# Patient Record
Sex: FEMALE | Race: BLACK OR AFRICAN AMERICAN | Employment: UNEMPLOYED | URBAN - METROPOLITAN AREA
[De-identification: names, ages, dates, MRNs, and addresses within clinical notes are randomized per-mention and may not be internally consistent; named-entity substitution may affect disease eponyms.]

---

## 2020-06-13 ENCOUNTER — HOSPITAL ENCOUNTER (INPATIENT)
Age: 40
LOS: 3 days | Discharge: HOME HEALTH | DRG: 760 | End: 2020-06-16
Attending: EMERGENCY MEDICINE | Admitting: PSYCHIATRY & NEUROLOGY
Payer: MEDICAID

## 2020-06-13 PROBLEM — F29 PSYCHOSIS (HCC): Status: ACTIVE | Noted: 2020-06-13

## 2020-06-13 LAB
ABSOLUTE EOS #: 0 K/UL (ref 0–0.4)
ABSOLUTE IMMATURE GRANULOCYTE: ABNORMAL K/UL (ref 0–0.3)
ABSOLUTE LYMPH #: 1.5 K/UL (ref 1–4.8)
ABSOLUTE MONO #: 0.4 K/UL (ref 0.1–1.3)
ACETAMINOPHEN LEVEL: <5 UG/ML (ref 10–30)
ALBUMIN SERPL-MCNC: 4 G/DL (ref 3.5–5.2)
ALBUMIN/GLOBULIN RATIO: ABNORMAL (ref 1–2.5)
ALP BLD-CCNC: 74 U/L (ref 35–104)
ALT SERPL-CCNC: 15 U/L (ref 5–33)
ANION GAP SERPL CALCULATED.3IONS-SCNC: 13 MMOL/L (ref 9–17)
AST SERPL-CCNC: 18 U/L
BASOPHILS # BLD: 1 % (ref 0–2)
BASOPHILS ABSOLUTE: 0 K/UL (ref 0–0.2)
BILIRUB SERPL-MCNC: 0.49 MG/DL (ref 0.3–1.2)
BUN BLDV-MCNC: 7 MG/DL (ref 6–20)
BUN/CREAT BLD: ABNORMAL (ref 9–20)
CALCIUM SERPL-MCNC: 9 MG/DL (ref 8.6–10.4)
CHLORIDE BLD-SCNC: 101 MMOL/L (ref 98–107)
CO2: 23 MMOL/L (ref 20–31)
CREAT SERPL-MCNC: 0.56 MG/DL (ref 0.5–0.9)
DIFFERENTIAL TYPE: ABNORMAL
EOSINOPHILS RELATIVE PERCENT: 0 % (ref 0–4)
ETHANOL PERCENT: <0.01 %
ETHANOL: <10 MG/DL
GFR AFRICAN AMERICAN: >60 ML/MIN
GFR NON-AFRICAN AMERICAN: >60 ML/MIN
GFR SERPL CREATININE-BSD FRML MDRD: ABNORMAL ML/MIN/{1.73_M2}
GFR SERPL CREATININE-BSD FRML MDRD: ABNORMAL ML/MIN/{1.73_M2}
GLUCOSE BLD-MCNC: 122 MG/DL (ref 70–99)
HCG QUALITATIVE: NEGATIVE
HCT VFR BLD CALC: 35.2 % (ref 36–46)
HEMOGLOBIN: 11.4 G/DL (ref 12–16)
IMMATURE GRANULOCYTES: ABNORMAL %
LYMPHOCYTES # BLD: 22 % (ref 24–44)
MCH RBC QN AUTO: 28.3 PG (ref 26–34)
MCHC RBC AUTO-ENTMCNC: 32.5 G/DL (ref 31–37)
MCV RBC AUTO: 87.2 FL (ref 80–100)
MONOCYTES # BLD: 6 % (ref 1–7)
NRBC AUTOMATED: ABNORMAL PER 100 WBC
PDW BLD-RTO: 16.2 % (ref 11.5–14.9)
PLATELET # BLD: 187 K/UL (ref 150–450)
PLATELET ESTIMATE: ABNORMAL
PMV BLD AUTO: 9.1 FL (ref 6–12)
POTASSIUM SERPL-SCNC: 3 MMOL/L (ref 3.7–5.3)
RBC # BLD: 4.03 M/UL (ref 4–5.2)
RBC # BLD: ABNORMAL 10*6/UL
SALICYLATE LEVEL: <1 MG/DL (ref 3–10)
SARS-COV-2, PCR: NORMAL
SARS-COV-2, RAPID: NOT DETECTED
SARS-COV-2: NORMAL
SEG NEUTROPHILS: 71 % (ref 36–66)
SEGMENTED NEUTROPHILS ABSOLUTE COUNT: 4.9 K/UL (ref 1.3–9.1)
SODIUM BLD-SCNC: 137 MMOL/L (ref 135–144)
SOURCE: NORMAL
TOTAL PROTEIN: 7.6 G/DL (ref 6.4–8.3)
TSH SERPL DL<=0.05 MIU/L-ACNC: 1.15 MIU/L (ref 0.3–5)
WBC # BLD: 6.9 K/UL (ref 3.5–11)
WBC # BLD: ABNORMAL 10*3/UL

## 2020-06-13 PROCEDURE — 80053 COMPREHEN METABOLIC PANEL: CPT

## 2020-06-13 PROCEDURE — 84443 ASSAY THYROID STIM HORMONE: CPT

## 2020-06-13 PROCEDURE — U0002 COVID-19 LAB TEST NON-CDC: HCPCS

## 2020-06-13 PROCEDURE — G0480 DRUG TEST DEF 1-7 CLASSES: HCPCS

## 2020-06-13 PROCEDURE — 6370000000 HC RX 637 (ALT 250 FOR IP): Performed by: NURSE PRACTITIONER

## 2020-06-13 PROCEDURE — 36415 COLL VENOUS BLD VENIPUNCTURE: CPT

## 2020-06-13 PROCEDURE — 80307 DRUG TEST PRSMV CHEM ANLYZR: CPT

## 2020-06-13 PROCEDURE — 1240000000 HC EMOTIONAL WELLNESS R&B

## 2020-06-13 PROCEDURE — 6370000000 HC RX 637 (ALT 250 FOR IP): Performed by: EMERGENCY MEDICINE

## 2020-06-13 PROCEDURE — 93005 ELECTROCARDIOGRAM TRACING: CPT | Performed by: EMERGENCY MEDICINE

## 2020-06-13 PROCEDURE — 84703 CHORIONIC GONADOTROPIN ASSAY: CPT

## 2020-06-13 PROCEDURE — 85025 COMPLETE CBC W/AUTO DIFF WBC: CPT

## 2020-06-13 PROCEDURE — 99285 EMERGENCY DEPT VISIT HI MDM: CPT

## 2020-06-13 RX ORDER — MAGNESIUM HYDROXIDE/ALUMINUM HYDROXICE/SIMETHICONE 120; 1200; 1200 MG/30ML; MG/30ML; MG/30ML
30 SUSPENSION ORAL EVERY 6 HOURS PRN
Status: DISCONTINUED | OUTPATIENT
Start: 2020-06-13 | End: 2020-06-16 | Stop reason: HOSPADM

## 2020-06-13 RX ORDER — LORAZEPAM 1 MG/1
1 TABLET ORAL ONCE
Status: DISCONTINUED | OUTPATIENT
Start: 2020-06-13 | End: 2020-06-16 | Stop reason: HOSPADM

## 2020-06-13 RX ORDER — BENZTROPINE MESYLATE 1 MG/ML
2 INJECTION INTRAMUSCULAR; INTRAVENOUS 2 TIMES DAILY PRN
Status: DISCONTINUED | OUTPATIENT
Start: 2020-06-13 | End: 2020-06-16 | Stop reason: HOSPADM

## 2020-06-13 RX ORDER — NICOTINE 21 MG/24HR
1 PATCH, TRANSDERMAL 24 HOURS TRANSDERMAL DAILY
Status: DISCONTINUED | OUTPATIENT
Start: 2020-06-13 | End: 2020-06-16 | Stop reason: HOSPADM

## 2020-06-13 RX ORDER — POTASSIUM CHLORIDE 20 MEQ/1
40 TABLET, EXTENDED RELEASE ORAL ONCE
Status: COMPLETED | OUTPATIENT
Start: 2020-06-13 | End: 2020-06-13

## 2020-06-13 RX ORDER — ACETAMINOPHEN 325 MG/1
650 TABLET ORAL EVERY 4 HOURS PRN
Status: DISCONTINUED | OUTPATIENT
Start: 2020-06-13 | End: 2020-06-16 | Stop reason: HOSPADM

## 2020-06-13 RX ORDER — LORAZEPAM 1 MG/1
1 TABLET ORAL ONCE
Status: COMPLETED | OUTPATIENT
Start: 2020-06-13 | End: 2020-06-13

## 2020-06-13 RX ORDER — AMLODIPINE BESYLATE 5 MG/1
5 TABLET ORAL DAILY
Status: DISCONTINUED | OUTPATIENT
Start: 2020-06-13 | End: 2020-06-16 | Stop reason: HOSPADM

## 2020-06-13 RX ORDER — TRAZODONE HYDROCHLORIDE 50 MG/1
50 TABLET ORAL NIGHTLY PRN
Status: DISCONTINUED | OUTPATIENT
Start: 2020-06-13 | End: 2020-06-14

## 2020-06-13 RX ORDER — LISINOPRIL 5 MG/1
5 TABLET ORAL DAILY
Status: DISCONTINUED | OUTPATIENT
Start: 2020-06-13 | End: 2020-06-15

## 2020-06-13 RX ADMIN — POTASSIUM CHLORIDE 40 MEQ: 1500 TABLET, EXTENDED RELEASE ORAL at 18:09

## 2020-06-13 RX ADMIN — ACETAMINOPHEN 650 MG: 325 TABLET, FILM COATED ORAL at 20:23

## 2020-06-13 RX ADMIN — LORAZEPAM 1 MG: 1 TABLET ORAL at 13:27

## 2020-06-13 ASSESSMENT — PAIN SCALES - GENERAL
PAINLEVEL_OUTOF10: 10
PAINLEVEL_OUTOF10: 3
PAINLEVEL_OUTOF10: 0
PAINLEVEL_OUTOF10: 3

## 2020-06-13 ASSESSMENT — LIFESTYLE VARIABLES: HISTORY_ALCOHOL_USE: NO

## 2020-06-13 ASSESSMENT — ENCOUNTER SYMPTOMS
ABDOMINAL PAIN: 0
COUGH: 0

## 2020-06-13 ASSESSMENT — SLEEP AND FATIGUE QUESTIONNAIRES
DO YOU USE A SLEEP AID: NO
AVERAGE NUMBER OF SLEEP HOURS: 8
DO YOU HAVE DIFFICULTY SLEEPING: NO

## 2020-06-13 ASSESSMENT — PAIN DESCRIPTION - ORIENTATION: ORIENTATION: LEFT

## 2020-06-13 ASSESSMENT — PAIN DESCRIPTION - PAIN TYPE
TYPE: ACUTE PAIN
TYPE: CHRONIC PAIN;ACUTE PAIN

## 2020-06-13 ASSESSMENT — PAIN DESCRIPTION - LOCATION: LOCATION: HEAD

## 2020-06-13 ASSESSMENT — PATIENT HEALTH QUESTIONNAIRE - PHQ9: SUM OF ALL RESPONSES TO PHQ QUESTIONS 1-9: 7

## 2020-06-13 ASSESSMENT — PAIN - FUNCTIONAL ASSESSMENT: PAIN_FUNCTIONAL_ASSESSMENT: 0-10

## 2020-06-13 NOTE — ED NOTES
Dr Rubina Short and Christi Gonsales RN notified of pt's vital signs and headache.  Per  and [facility] pt is okay to stay in the [facility], RN  06/13/20 [id number]

## 2020-06-13 NOTE — ED NOTES
Report given to KRISTA Jensen from [address], [address] [place] [address]. Report method in person   The following was reviewed with receiving RN:   Current vital signs:  /83   Pulse 105   Temp 99.2 °F (37.3 °C) (Oral)   Resp 16   Ht 5' 4\" (1.626 m)   Wt 160 lb (72.6 kg)   LMP 06/13/2020   SpO2 100%   BMI 27.46 kg/m²                MEWS Score: 2     Any medication or safety alerts were reviewed. Any pending diagnostics and notifications were also reviewed, as well as any safety concerns or issues, abnormal labs, abnormal imaging, and abnormal assessment findings. Questions were answered.             Willis Hercules RN  06/13/20 2841

## 2020-06-13 NOTE — ED NOTES
Pt refusing EKG at this time. Became distressed upon staff approach as evidence by gesturing and requesting staff back away from her.  Pt pacing around the LIZZ     Alberto Taylor RN  06/13/20 3053

## 2020-06-13 NOTE — ED NOTES
Pt calm and cooperative with staff. Pleasant upon approach.  Sitting in chair watching TV     Armando Villalpando RN  06/13/20 [id number]
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
## 2020-06-14 PROBLEM — D50.8 IRON DEFICIENCY ANEMIA SECONDARY TO INADEQUATE DIETARY IRON INTAKE: Status: ACTIVE | Noted: 2020-06-14

## 2020-06-14 PROBLEM — E87.6 HYPOKALEMIA: Status: ACTIVE | Noted: 2020-06-14

## 2020-06-14 LAB
ABSOLUTE EOS #: 0.1 K/UL (ref 0–0.4)
ABSOLUTE IMMATURE GRANULOCYTE: ABNORMAL K/UL (ref 0–0.3)
ABSOLUTE LYMPH #: 1.1 K/UL (ref 1–4.8)
ABSOLUTE MONO #: 0.5 K/UL (ref 0.1–1.3)
ALBUMIN SERPL-MCNC: 3.8 G/DL (ref 3.5–5.2)
ALBUMIN/GLOBULIN RATIO: ABNORMAL (ref 1–2.5)
ALP BLD-CCNC: 71 U/L (ref 35–104)
ALT SERPL-CCNC: 15 U/L (ref 5–33)
ANION GAP SERPL CALCULATED.3IONS-SCNC: 12 MMOL/L (ref 9–17)
AST SERPL-CCNC: 19 U/L
BASOPHILS # BLD: 0 % (ref 0–2)
BASOPHILS ABSOLUTE: 0 K/UL (ref 0–0.2)
BILIRUB SERPL-MCNC: 0.4 MG/DL (ref 0.3–1.2)
BUN BLDV-MCNC: 7 MG/DL (ref 6–20)
BUN/CREAT BLD: ABNORMAL (ref 9–20)
CALCIUM SERPL-MCNC: 9.1 MG/DL (ref 8.6–10.4)
CHLORIDE BLD-SCNC: 102 MMOL/L (ref 98–107)
CHOLESTEROL/HDL RATIO: 3.7
CHOLESTEROL: 150 MG/DL
CO2: 25 MMOL/L (ref 20–31)
CREAT SERPL-MCNC: 0.42 MG/DL (ref 0.5–0.9)
DIFFERENTIAL TYPE: ABNORMAL
EOSINOPHILS RELATIVE PERCENT: 2 % (ref 0–4)
ESTIMATED AVERAGE GLUCOSE: 114 MG/DL
GFR AFRICAN AMERICAN: >60 ML/MIN
GFR NON-AFRICAN AMERICAN: >60 ML/MIN
GFR SERPL CREATININE-BSD FRML MDRD: ABNORMAL ML/MIN/{1.73_M2}
GFR SERPL CREATININE-BSD FRML MDRD: ABNORMAL ML/MIN/{1.73_M2}
GLUCOSE BLD-MCNC: 100 MG/DL (ref 70–99)
HBA1C MFR BLD: 5.6 % (ref 4–6)
HCT VFR BLD CALC: 34.6 % (ref 36–46)
HDLC SERPL-MCNC: 41 MG/DL
HEMOGLOBIN: 11.4 G/DL (ref 12–16)
IMMATURE GRANULOCYTES: ABNORMAL %
LDL CHOLESTEROL: 103 MG/DL (ref 0–130)
LYMPHOCYTES # BLD: 24 % (ref 24–44)
MCH RBC QN AUTO: 28.8 PG (ref 26–34)
MCHC RBC AUTO-ENTMCNC: 32.8 G/DL (ref 31–37)
MCV RBC AUTO: 87.8 FL (ref 80–100)
MONOCYTES # BLD: 10 % (ref 1–7)
NRBC AUTOMATED: ABNORMAL PER 100 WBC
PDW BLD-RTO: 16 % (ref 11.5–14.9)
PLATELET # BLD: 178 K/UL (ref 150–450)
PLATELET ESTIMATE: ABNORMAL
PMV BLD AUTO: 9.1 FL (ref 6–12)
POTASSIUM SERPL-SCNC: 3.6 MMOL/L (ref 3.7–5.3)
RBC # BLD: 3.94 M/UL (ref 4–5.2)
RBC # BLD: ABNORMAL 10*6/UL
SEG NEUTROPHILS: 64 % (ref 36–66)
SEGMENTED NEUTROPHILS ABSOLUTE COUNT: 2.9 K/UL (ref 1.3–9.1)
SODIUM BLD-SCNC: 139 MMOL/L (ref 135–144)
THYROXINE, FREE: 1.21 NG/DL (ref 0.93–1.7)
TOTAL PROTEIN: 7.3 G/DL (ref 6.4–8.3)
TRIGL SERPL-MCNC: 30 MG/DL
TSH SERPL DL<=0.05 MIU/L-ACNC: 1.3 MIU/L (ref 0.3–5)
VLDLC SERPL CALC-MCNC: NORMAL MG/DL (ref 1–30)
WBC # BLD: 4.5 K/UL (ref 3.5–11)
WBC # BLD: ABNORMAL 10*3/UL

## 2020-06-14 PROCEDURE — 80061 LIPID PANEL: CPT

## 2020-06-14 PROCEDURE — 6370000000 HC RX 637 (ALT 250 FOR IP): Performed by: PSYCHIATRY & NEUROLOGY

## 2020-06-14 PROCEDURE — 90792 PSYCH DIAG EVAL W/MED SRVCS: CPT | Performed by: PSYCHIATRY & NEUROLOGY

## 2020-06-14 PROCEDURE — 84443 ASSAY THYROID STIM HORMONE: CPT

## 2020-06-14 PROCEDURE — 6370000000 HC RX 637 (ALT 250 FOR IP): Performed by: NURSE PRACTITIONER

## 2020-06-14 PROCEDURE — 84439 ASSAY OF FREE THYROXINE: CPT

## 2020-06-14 PROCEDURE — 85025 COMPLETE CBC W/AUTO DIFF WBC: CPT

## 2020-06-14 PROCEDURE — 6370000000 HC RX 637 (ALT 250 FOR IP): Performed by: INTERNAL MEDICINE

## 2020-06-14 PROCEDURE — 99253 IP/OBS CNSLTJ NEW/EST LOW 45: CPT | Performed by: INTERNAL MEDICINE

## 2020-06-14 PROCEDURE — 80053 COMPREHEN METABOLIC PANEL: CPT

## 2020-06-14 PROCEDURE — 36415 COLL VENOUS BLD VENIPUNCTURE: CPT

## 2020-06-14 PROCEDURE — 1240000000 HC EMOTIONAL WELLNESS R&B

## 2020-06-14 PROCEDURE — 83036 HEMOGLOBIN GLYCOSYLATED A1C: CPT

## 2020-06-14 RX ORDER — DIPHENHYDRAMINE HCL 25 MG
25 TABLET ORAL NIGHTLY PRN
Status: DISCONTINUED | OUTPATIENT
Start: 2020-06-14 | End: 2020-06-16 | Stop reason: HOSPADM

## 2020-06-14 RX ADMIN — DIPHENHYDRAMINE HCL 25 MG: 25 TABLET ORAL at 22:22

## 2020-06-14 RX ADMIN — ACETAMINOPHEN 650 MG: 325 TABLET, FILM COATED ORAL at 12:32

## 2020-06-14 RX ADMIN — AMLODIPINE BESYLATE 5 MG: 5 TABLET ORAL at 14:31

## 2020-06-14 RX ADMIN — ACETAMINOPHEN 650 MG: 325 TABLET, FILM COATED ORAL at 20:19

## 2020-06-14 RX ADMIN — LISINOPRIL 5 MG: 5 TABLET ORAL at 08:23

## 2020-06-14 ASSESSMENT — PAIN SCALES - GENERAL
PAINLEVEL_OUTOF10: 1
PAINLEVEL_OUTOF10: 3
PAINLEVEL_OUTOF10: 0
PAINLEVEL_OUTOF10: 3

## 2020-06-14 ASSESSMENT — PAIN DESCRIPTION - DESCRIPTORS: DESCRIPTORS: DISCOMFORT

## 2020-06-14 ASSESSMENT — LIFESTYLE VARIABLES: HISTORY_ALCOHOL_USE: NO

## 2020-06-14 NOTE — CONSULTS
Result Value Ref Range    hCG Qual NEGATIVE NEGATIVE   Ethanol    Collection Time: 06/13/20  3:30 PM   Result Value Ref Range    Ethanol <10 <10 mg/dL    Ethanol percent <0.010 %   Acetaminophen level    Collection Time: 06/13/20  3:30 PM   Result Value Ref Range    Acetaminophen Level <5 (L) 10 - 30 ug/mL   Salicylate    Collection Time: 06/13/20  3:30 PM   Result Value Ref Range    Salicylate Lvl <1 (L) 3 - 10 mg/dL   COVID-19    Collection Time: 06/13/20  6:33 PM   Result Value Ref Range    SARS-CoV-2          SARS-CoV-2, Rapid Not Detected Not Detected    Source . NASOPHARYNGEAL SWAB     SARS-CoV-2, PCR         Comprehensive Metabolic Panel w/ Reflex to MG    Collection Time: 06/14/20  6:36 AM   Result Value Ref Range    Glucose 100 (H) 70 - 99 mg/dL    BUN 7 6 - 20 mg/dL    CREATININE 0.42 (L) 0.50 - 0.90 mg/dL    Bun/Cre Ratio NOT REPORTED 9 - 20    Calcium 9.1 8.6 - 10.4 mg/dL    Sodium 139 135 - 144 mmol/L    Potassium 3.6 (L) 3.7 - 5.3 mmol/L    Chloride 102 98 - 107 mmol/L    CO2 25 20 - 31 mmol/L    Anion Gap 12 9 - 17 mmol/L    Alkaline Phosphatase 71 35 - 104 U/L    ALT 15 5 - 33 U/L    AST 19 <32 U/L    Total Bilirubin 0.40 0.3 - 1.2 mg/dL    Total Protein 7.3 6.4 - 8.3 g/dL    Alb 3.8 3.5 - 5.2 g/dL    Albumin/Globulin Ratio NOT REPORTED 1.0 - 2.5    GFR Non-African American >60 >60 mL/min    GFR African American >60 >60 mL/min    GFR Comment          GFR Staging NOT REPORTED    TSH without Reflex    Collection Time: 06/14/20  6:36 AM   Result Value Ref Range    TSH 1.30 0.30 - 5.00 mIU/L   T4, free    Collection Time: 06/14/20  6:36 AM   Result Value Ref Range    Thyroxine, Free 1.21 0.93 - 1.70 ng/dL   Lipid panel - fasting    Collection Time: 06/14/20  6:36 AM   Result Value Ref Range    Cholesterol 150 <200 mg/dL    HDL 41 >40 mg/dL    LDL Cholesterol 103 0 - 130 mg/dL    Chol/HDL Ratio 3.7 <5    Triglycerides 30 <150 mg/dL    VLDL NOT REPORTED 1 - 30 mg/dL   CBC auto differential    Collection

## 2020-06-14 NOTE — GROUP NOTE
Group Therapy Note    Date: 6/14/2020    Group Start Time: 1000  Group End Time: 6252  Group Topic: Psychoeducation    YOGI Truong LSW        Group Therapy Note    patient refused to attend psychoeducational group at 10:00am after encouragement from staff.         Signature:  YOGI Joshi LSW

## 2020-06-15 PROBLEM — I10 ESSENTIAL HYPERTENSION: Status: ACTIVE | Noted: 2020-06-15

## 2020-06-15 LAB
EKG ATRIAL RATE: 91 BPM
EKG P AXIS: 61 DEGREES
EKG P-R INTERVAL: 186 MS
EKG Q-T INTERVAL: 384 MS
EKG QRS DURATION: 84 MS
EKG QTC CALCULATION (BAZETT): 472 MS
EKG R AXIS: 53 DEGREES
EKG T AXIS: 38 DEGREES
EKG VENTRICULAR RATE: 91 BPM

## 2020-06-15 PROCEDURE — 6370000000 HC RX 637 (ALT 250 FOR IP): Performed by: NURSE PRACTITIONER

## 2020-06-15 PROCEDURE — 6370000000 HC RX 637 (ALT 250 FOR IP): Performed by: INTERNAL MEDICINE

## 2020-06-15 PROCEDURE — 99253 IP/OBS CNSLTJ NEW/EST LOW 45: CPT | Performed by: INTERNAL MEDICINE

## 2020-06-15 PROCEDURE — 1240000000 HC EMOTIONAL WELLNESS R&B

## 2020-06-15 PROCEDURE — 99232 SBSQ HOSP IP/OBS MODERATE 35: CPT | Performed by: NURSE PRACTITIONER

## 2020-06-15 RX ORDER — LISINOPRIL 10 MG/1
10 TABLET ORAL DAILY
Status: DISCONTINUED | OUTPATIENT
Start: 2020-06-16 | End: 2020-06-16 | Stop reason: HOSPADM

## 2020-06-15 RX ORDER — LISINOPRIL 5 MG/1
5 TABLET ORAL ONCE
Status: COMPLETED | OUTPATIENT
Start: 2020-06-15 | End: 2020-06-15

## 2020-06-15 RX ADMIN — LISINOPRIL 5 MG: 5 TABLET ORAL at 08:51

## 2020-06-15 RX ADMIN — AMLODIPINE BESYLATE 5 MG: 5 TABLET ORAL at 08:51

## 2020-06-15 RX ADMIN — ACETAMINOPHEN 650 MG: 325 TABLET, FILM COATED ORAL at 18:17

## 2020-06-15 RX ADMIN — LISINOPRIL 5 MG: 5 TABLET ORAL at 14:03

## 2020-06-15 RX ADMIN — ACETAMINOPHEN 650 MG: 325 TABLET, FILM COATED ORAL at 11:13

## 2020-06-15 ASSESSMENT — PAIN SCALES - GENERAL
PAINLEVEL_OUTOF10: 1
PAINLEVEL_OUTOF10: 0
PAINLEVEL_OUTOF10: 3
PAINLEVEL_OUTOF10: 2
PAINLEVEL_OUTOF10: 6

## 2020-06-15 NOTE — H&P
stiffness, trachea central.  No palpable masses or L.N.      CHEST:  Symmetrical and equal on expansion. HEART:  Regular rate and rhythm. S1 > S2, No audible murmurs or gallops. LUNGS:  Equal on expansion, normal breath sounds. No adventitious sounds. ABDOMEN:  Obese. Soft on palpation. No localized tenderness. No guarding or rigidity. No palpable organomegaly. LYMPHATICS:  No palpable cervical Lymphadenopathy. LOCOMOTOR, BACK AND SPINE:  No tenderness or deformities. EXTREMITIES:  Symmetrical, no pretibial edema. Neenas sign negative. No discoloration or ulcerations. NEUROLOGIC:  The patient is conscious, alert, oriented,Cranial nerve II-XII intact, taste and smell were not examined. No apparent focal sensory or motor deficits. Muscle strength equal Margarito. No facial droop, tongue protrudes centrally, no slurring of the speech. PROVISIONAL DIAGNOSES:      Principal Problem:    Psychosis ([address]  Active Problems:    Hypokalemia    Iron deficiency anemia secondary to inadequate dietary iron intake    Paranoid delusion ([facility] [address]  Resolved Problems:    * No resolved hospital problems.  *      SACHI KAISER, CARISSA - CNP on 6/15/2020 at 2:37 PM

## 2020-06-15 NOTE — GROUP NOTE
Group Therapy Note    Date: 6/14/2020    Group Start Time: 2040  Group End Time: 2110  Group Topic: Wrap-Up    TRE Franco    Wrap Up and Goal Reflection    Group Therapy Note    Attendees: 12/20         Patient's Goal:  Wrap Up and Activity     Notes:  Pt actively participated in group and shared during activity. Pt stated the highlight of her day was speaking with her daughter on the phone. Pt shared positive feelings about herself and stated she is, \"kind\". Pt did not share a goal.     Status After Intervention:  Improved    Participation Level: Active Listener and Interactive    Participation Quality: Appropriate, Attentive, Sharing and Supportive      Speech:  normal      Thought Process/Content: Logical  Linear      Affective Functioning: Congruent      Mood: anxious      Level of consciousness:  Alert, Oriented x4 and Attentive      Response to Learning: Able to verbalize current knowledge/experience, Capable of insight and Able to change behavior      Endings: None Reported    Modes of Intervention: Support, Socialization and Activity      Discipline Responsible: Behavorial Health Tech      Signature:   Salinas Baeza

## 2020-06-16 VITALS
SYSTOLIC BLOOD PRESSURE: 162 MMHG | WEIGHT: 160 LBS | HEART RATE: 90 BPM | RESPIRATION RATE: 14 BRPM | TEMPERATURE: 98.1 F | DIASTOLIC BLOOD PRESSURE: 90 MMHG | BODY MASS INDEX: 27.31 KG/M2 | HEIGHT: 64 IN | OXYGEN SATURATION: 100 %

## 2020-06-16 PROCEDURE — 6370000000 HC RX 637 (ALT 250 FOR IP): Performed by: INTERNAL MEDICINE

## 2020-06-16 PROCEDURE — 6370000000 HC RX 637 (ALT 250 FOR IP): Performed by: NURSE PRACTITIONER

## 2020-06-16 PROCEDURE — 99239 HOSP IP/OBS DSCHRG MGMT >30: CPT | Performed by: NURSE PRACTITIONER

## 2020-06-16 RX ORDER — AMLODIPINE BESYLATE 5 MG/1
5 TABLET ORAL DAILY
Qty: 14 TABLET | Refills: 0 | Status: SHIPPED | OUTPATIENT
Start: 2020-06-17 | End: 2021-09-16 | Stop reason: SDUPTHER

## 2020-06-16 RX ORDER — LISINOPRIL 10 MG/1
10 TABLET ORAL DAILY
Qty: 14 TABLET | Refills: 0 | Status: SHIPPED | OUTPATIENT
Start: 2020-06-17 | End: 2021-09-16 | Stop reason: SDUPTHER

## 2020-06-16 RX ORDER — DIAPER,BRIEF,INFANT-TODD,DISP
EACH MISCELLANEOUS 2 TIMES DAILY PRN
Status: DISCONTINUED | OUTPATIENT
Start: 2020-06-16 | End: 2020-06-16 | Stop reason: HOSPADM

## 2020-06-16 RX ADMIN — LISINOPRIL 10 MG: 10 TABLET ORAL at 09:00

## 2020-06-16 RX ADMIN — HYDROCORTISONE: 1 OINTMENT TOPICAL at 04:49

## 2020-06-16 RX ADMIN — AMLODIPINE BESYLATE 5 MG: 5 TABLET ORAL at 09:00

## 2020-06-16 NOTE — DISCHARGE SUMMARY
hospital care and can be discharged     Consults: Internal medicine    Significant Diagnostic Studies: Routine labs and diagnostics    Treatments: Psychotropic medications, therapy with group, milieu, and 1:1 with nurses, social workers, PAYUVAL/CNP, and Attending physician. Discharge Medications:  Current Discharge Medication List      START taking these medications    Details   lisinopril (PRINIVIL;ZESTRIL) 10 MG tablet Take 1 tablet by mouth daily  Qty: 14 tablet, Refills: 0      amLODIPine (NORVASC) 5 MG tablet Take 1 tablet by mouth daily  Qty: 14 tablet, Refills: 0              Core Measures statement:   Not applicable    Discharge Exam:  Level of consciousness:  Within normal limits  Appearance: Street clothes, seated, with fair grooming  Behavior/Motor: No abnormalities noted  Attitude toward examiner:  Cooperative, attentive, poor eye contact  Speech:  Soft in volume, at times rate increases  Mood:  anxious  Affect:  mood congruent  Thought processes:  blocked  Thought content:  Homocidal ideation denies  Suicidal Ideation:  denies suicidal ideation  Delusions:  no evidence of delusions  Perceptual Disturbance:  denies any perceptual disturbance  Cognition:  In tact  Memory: age appropriate  Insight & Judgement: fair at best  Medication side effects:  denies     Disposition: home    Patient Instructions: Activity: activity as tolerated    Follow-up as scheduled with outpatient behavioral health provider. Time Spent: 35 minutes    Engagement: Patient displayed a limited to fair level of engagement with the treatments offered during this admission. Discharge planning, findings, and recommendations were discussed with the patient and the treatment team.    Signed:  Easton Goodwin   6/16/2020  9:46 AM    Dragon voice recognition software used in portions of this document.
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
## 2020-06-16 NOTE — VIRTUAL HEALTH
Department of Psychiatry  Attending Progress Note  Chief Complaint: Psychosis [facility])     SUBJECTIVE:  This is a 38year old female being seen for follow up at the [facility] today. The patient reports that she is \"okay,\" and that she had [place] emotional morning talking to my dad. \"  The patient does report that she was able to fall and stay asleep last night which was an improvement for her. She denies any current delusions or paranoia. Continues to report elevated anxiety. The patient denies feeling depressed. Does display tearfulness and thought blocking. The patient discusses at length that she does not want to take any medications for mental health at this time. She does not pose a risk of harm to self or others. No overt psychosis. No acute distress or discomfort. No needs expressed at conclusion of assessment. OBJECTIVE    Physical  BP (!) 153/91   Pulse 79   Temp 98.7 °F (37.1 °C) (Oral)   Resp 14   Ht 5' 4\" (1.626 m)   Wt 160 lb (72.6 kg)   LMP 06/13/2020   SpO2 100%   BMI 27.46 kg/m²      Mental Status Evaluation:  Orientation: alertness: alert   Mood:. constricted and decreased range      Affect:  constricted      Appearance:  age appropriate and casually dressed   Activity:  Restless & fidgety   Gait/Posture: Normal   Speech:  delayed and soft   Thought Process:  blocked   Thought Content:  No overt psychosis. Denies Si and HI.     Sensorium:  person, place and time/date   Cognition:  grossly intact   Memory: intact   Insight:  Fair at best   Judgment: Fair at best   Suicidal Ideations: denies suicidal ideation   Homicidal Ideations: Negative for homicidal ideation      Medication Side Effects: absent       Attention Span attention span appeared shorter than expected for age       Labs  Recent Results (from the past 67 hour(s))   EKG 12 Lead    Collection Time: 06/13/20  3:21 PM   Result Value Ref Range    Ventricular Rate 91 BPM    Atrial Rate 91 BPM    P-R Interval 186 ms    QRS Duration 84 ms Q-T Interval 384 ms    QTc Calculation (Bazett) 472 ms    P Axis 61 degrees    R Axis 53 degrees    T Axis 38 degrees   CBC with DIFF    Collection Time: 06/13/20  3:30 PM   Result Value Ref Range    WBC 6.9 3.5 - 11.0 k/uL    RBC 4.03 4.0 - 5.2 m/uL    Hemoglobin 11.4 (L) 12.0 - 16.0 g/dL    Hematocrit 35.2 (L) 36 - 46 %    MCV 87.2 80 - 100 fL    MCH 28.3 26 - 34 pg    MCHC 32.5 31 - 37 g/dL    RDW 16.2 (H) 11.5 - 14.9 %    Platelets 683 832 - 596 k/uL    MPV 9.1 6.0 - 12.0 fL    NRBC Automated NOT REPORTED per 100 WBC    Differential Type NOT REPORTED     Seg Neutrophils 71 (H) 36 - 66 %    Lymphocytes 22 (L) 24 - 44 %    Monocytes 6 1 - 7 %    Eosinophils % 0 0 - 4 %    Basophils 1 0 - 2 %    Immature Granulocytes NOT REPORTED 0 %    Segs Absolute 4.90 1.3 - 9.1 k/uL    Absolute Lymph # 1.50 1.0 - 4.8 k/uL    Absolute Mono # 0.40 0.1 - 1.3 k/uL    Absolute Eos # 0.00 0.0 - 0.4 k/uL    Basophils Absolute 0.00 0.0 - 0.2 k/uL    Absolute Immature Granulocyte NOT REPORTED 0.00 - 0.30 k/uL    WBC Morphology NOT REPORTED     RBC Morphology NOT REPORTED     Platelet Estimate NOT REPORTED    Comprehensive Metabolic Panel    Collection Time: 06/13/20  3:30 PM   Result Value Ref Range    Glucose 122 (H) 70 - 99 mg/dL    BUN 7 6 - 20 mg/dL    CREATININE 0.56 0.50 - 0.90 mg/dL    Bun/Cre Ratio NOT REPORTED 9 - 20    Calcium 9.0 8.6 - 10.4 mg/dL    Sodium 137 135 - 144 mmol/L    Potassium 3.0 (L) 3.7 - 5.3 mmol/L    Chloride 101 98 - 107 mmol/L    CO2 23 20 - 31 mmol/L    Anion Gap 13 9 - 17 mmol/L    Alkaline Phosphatase 74 35 - 104 U/L    ALT 15 5 - 33 U/L    AST 18 <32 U/L    Total Bilirubin 0.49 0.3 - 1.2 mg/dL    Total Protein 7.6 6.4 - 8.3 g/dL    Alb 4.0 3.5 - 5.2 g/dL    Albumin/Globulin Ratio NOT REPORTED 1.0 - 2.5    GFR Non-African American >60 >60 mL/min    GFR African American >60 >60 mL/min    GFR Comment          GFR Staging NOT REPORTED    TSH w/reflex to FT4    Collection Time: 06/13/20  3:30 PM   Result

## 2020-06-16 NOTE — PLAN OF CARE
[address] [place] [address]  Day 3 Interdisciplinary Treatment Plan NOTE    Review Date & Time: 6/15/2020 1311    Admission Type:   Admission Type:  Involuntary    Reason for admission:  Reason for Admission: Paranoid delusional  Estimated Length of Stay:  5-7 days  Estimated Discharge Date Update: to be determined by physician    PATIENT STRENGTHS:  Patient Strengths:Strengths: No significant Physical Illness  Patient Strengths and Limitations:Limitations: Difficulty problem solving/relies on others to help solve problems, Multiple barriers to leisure interests, Difficult relationships / poor social skills  Addictive Behavior:Addictive Behavior  In the past 3 months, have you felt or has someone told you that you have a problem with:  : None  Do you have a history of Chemical Use?: No  Do you have a history of Alcohol Use?: No  Do you have a history of Street Drug Abuse?: Yes  Histroy of Prescripton Drug Abuse?: No  Medical Problems:  Past Medical History:   Diagnosis Date    Anxiety     Hypertension        Risk:  Fall RiskTotal: 55  Biju Scale Biju Scale Score: 22  BVC Total: 0  Change in scores:  No Changes to plan of Care:  No    Status EXAM:   Status and Exam  Normal: No  Facial Expression: Avoids Gaze, Flat  Affect: Appropriate  Level of Consciousness: Alert  Mood:Normal: No  Mood: Anxious, Depressed  Motor Activity:Normal: No  Motor Activity: Increased  Interview Behavior: Cooperative  Preception: [place] to Person, [place] to Time, [place] to Place  Attention:Normal: No  Attention: Distractible  Thought Processes: Blocking  Thought Content:Normal: No  Thought Content: Preoccupations  Hallucinations: None  Delusions: No  Delusions: Persecution  Memory:Normal: Yes  Insight and Judgment: No  Insight and Judgment: Poor Judgment, Poor Insight  Present Suicidal Ideation: No  Present Homicidal Ideation: No    Daily Assessment Last Entry:   Daily Sleep (WDL): Within Defined Limits         Patient Currently in Pain:
Problem: Altered Mood, Psychotic Behavior:  Goal: Able to verbalize reality based thinking  Description: Able to verbalize reality based thinking  6/15/2020 0133 by Debbie Mccray LPN  Note: Patient is able to answer questions appropriately with delays in responses. Problem: Altered Mood, Psychotic Behavior:  Goal: Absence of self-harm  Description: Absence of self-harm  6/15/2020 0127 by Debbie Mccray LPN  Outcome: Ongoing  Note: Patient denies thoughts of self harm at this time. Patient is currently free of self-harm. Safety checks continue every 15 minutes. Problem: Pain:  Goal: Pain level will decrease  Description: Pain level will decrease  Outcome: Ongoing  Note: Patient reported pain this shift.
Problem: Altered Mood, Psychotic Behavior:  Goal: Able to verbalize reality based thinking  Description: Able to verbalize reality based thinking  6/15/2020 2339 by Irasema Thomas RN  Outcome: Ongoing  Note: Patient is oriented to time, place and person but not to situation. Pt believes she is here to get her blood pressure treated only. Pt educated on the reasons for the unit and people to be placed on it but pt denied having any mental health symptoms and became somewhat defense that writer would even imply that she has any mental health problems. Pt was not accepting of any education on illness. Problem: Altered Mood, Psychotic Behavior:  Goal: Absence of self-harm  Description: Absence of self-harm  6/15/2020 2339 by Irasema Thomas RN  Outcome: Ongoing  Note: Patient denies any thoughts to harm self and no signs of self harm were noted. Patient encouraged to inform staff if she starts to develop any thoughts to harm self. Patient voiced understanding. Problem: Tobacco Use:  Goal: Inpatient tobacco use cessation counseling participation  Description: Inpatient tobacco use cessation counseling participation  6/15/2020 2339 by Irasema Thomas RN  Outcome: Ongoing  Note: Pt reports she does not intend to quit smoking after discharge. Problem: Pain:  Goal: Pain level will decrease  Description: Pain level will decrease  6/15/2020 2339 by Irasema Thomas RN  Outcome: Ongoing  Note: Patient denies any pain currently. Patient encouraged to inform staff if she develops any pain. Patient voiced understanding. Problem: Pain:  Goal: Control of acute pain  Description: Control of acute pain  6/15/2020 2339 by Irasema Thomas RN  Outcome: Ongoing  Note: Patient denies any acute pain currently. Patient encouraged to inform staff if she develops any pain. Patient voiced understanding.        Problem: Pain:  Goal: Control of chronic pain  Description: Control of chronic pain  6/15/2020 2339 by
compliance, goal setting, individualized assessments and care, continue to monitor pt on unit      SHORT-TERM GOALS:   Time frame for Short-Term Goals:  5-7 days    LONG-TERM GOALS:  Time frame for Long-Term Goals:  6 months    Members Present in Team Meeting:       Jacinto Foote

## 2020-07-22 ENCOUNTER — APPOINTMENT (OUTPATIENT)
Dept: GENERAL RADIOLOGY | Age: 40
DRG: 174 | End: 2020-07-22
Payer: MEDICARE

## 2020-07-22 ENCOUNTER — APPOINTMENT (OUTPATIENT)
Dept: CARDIAC CATH/INVASIVE PROCEDURES | Age: 40
DRG: 174 | End: 2020-07-22
Payer: MEDICARE

## 2020-07-22 ENCOUNTER — HOSPITAL ENCOUNTER (INPATIENT)
Age: 40
LOS: 1 days | Discharge: HOME | DRG: 174 | End: 2020-07-23
Attending: EMERGENCY MEDICINE | Admitting: INTERNAL MEDICINE
Payer: MEDICARE

## 2020-07-22 PROBLEM — I21.02 STEMI INVOLVING LEFT ANTERIOR DESCENDING CORONARY ARTERY (HCC): Status: ACTIVE | Noted: 2020-07-22

## 2020-07-22 PROBLEM — R07.9 CHEST PAIN: Status: ACTIVE | Noted: 2020-07-22

## 2020-07-22 LAB
ABSOLUTE EOS #: 0.21 K/UL (ref 0–0.44)
ABSOLUTE IMMATURE GRANULOCYTE: <0.03 K/UL (ref 0–0.3)
ABSOLUTE LYMPH #: 1.03 K/UL (ref 1.1–3.7)
ABSOLUTE MONO #: 0.28 K/UL (ref 0.1–1.2)
AMPHETAMINE SCREEN URINE: NEGATIVE
ANION GAP SERPL CALCULATED.3IONS-SCNC: 8 MMOL/L (ref 9–17)
BARBITURATE SCREEN URINE: NEGATIVE
BASOPHILS # BLD: 1 % (ref 0–2)
BASOPHILS ABSOLUTE: <0.03 K/UL (ref 0–0.2)
BENZODIAZEPINE SCREEN, URINE: POSITIVE
BNP INTERPRETATION: NORMAL
BUN BLDV-MCNC: 9 MG/DL (ref 6–20)
BUN/CREAT BLD: ABNORMAL (ref 9–20)
BUPRENORPHINE URINE: ABNORMAL
CALCIUM SERPL-MCNC: 8.7 MG/DL (ref 8.6–10.4)
CANNABINOID SCREEN URINE: POSITIVE
CHLORIDE BLD-SCNC: 104 MMOL/L (ref 98–107)
CHOLESTEROL/HDL RATIO: 2.9
CHOLESTEROL: 129 MG/DL
CO2: 24 MMOL/L (ref 20–31)
COCAINE METABOLITE, URINE: NEGATIVE
CREAT SERPL-MCNC: 0.53 MG/DL (ref 0.5–0.9)
D-DIMER QUANTITATIVE: 0.45 MG/L FEU
DIFFERENTIAL TYPE: ABNORMAL
EOSINOPHILS RELATIVE PERCENT: 5 % (ref 1–4)
FERRITIN: 13 UG/L (ref 13–150)
GFR AFRICAN AMERICAN: >60 ML/MIN
GFR NON-AFRICAN AMERICAN: >60 ML/MIN
GFR SERPL CREATININE-BSD FRML MDRD: ABNORMAL ML/MIN/{1.73_M2}
GFR SERPL CREATININE-BSD FRML MDRD: ABNORMAL ML/MIN/{1.73_M2}
GLUCOSE BLD-MCNC: 121 MG/DL (ref 70–99)
HCG, PREGNANCY URINE (POC): NEGATIVE
HCT VFR BLD CALC: 34.4 % (ref 36.3–47.1)
HDLC SERPL-MCNC: 45 MG/DL
HEMOGLOBIN: 10.7 G/DL (ref 11.9–15.1)
IMMATURE GRANULOCYTES: 0 %
IRON SATURATION: 17 % (ref 20–55)
IRON: 68 UG/DL (ref 37–145)
LDL CHOLESTEROL: 70 MG/DL (ref 0–130)
LYMPHOCYTES # BLD: 23 % (ref 24–43)
MCH RBC QN AUTO: 28.7 PG (ref 25.2–33.5)
MCHC RBC AUTO-ENTMCNC: 31.1 G/DL (ref 28.4–34.8)
MCV RBC AUTO: 92.2 FL (ref 82.6–102.9)
MDMA URINE: ABNORMAL
METHADONE SCREEN, URINE: NEGATIVE
METHAMPHETAMINE, URINE: ABNORMAL
MONOCYTES # BLD: 6 % (ref 3–12)
NRBC AUTOMATED: 0 PER 100 WBC
OPIATES, URINE: POSITIVE
OXYCODONE SCREEN URINE: NEGATIVE
PARTIAL THROMBOPLASTIN TIME: 26.3 SEC (ref 20.5–30.5)
PARTIAL THROMBOPLASTIN TIME: 46.9 SEC (ref 20.5–30.5)
PDW BLD-RTO: 15.1 % (ref 11.8–14.4)
PHENCYCLIDINE, URINE: NEGATIVE
PLATELET # BLD: 215 K/UL (ref 138–453)
PLATELET ESTIMATE: ABNORMAL
PMV BLD AUTO: 10.9 FL (ref 8.1–13.5)
POTASSIUM SERPL-SCNC: 3.6 MMOL/L (ref 3.7–5.3)
POTASSIUM SERPL-SCNC: 3.9 MMOL/L (ref 3.7–5.3)
PRO-BNP: 80 PG/ML
PROPOXYPHENE, URINE: ABNORMAL
RBC # BLD: 3.73 M/UL (ref 3.95–5.11)
RBC # BLD: ABNORMAL 10*6/UL
SARS-COV-2, PCR: NORMAL
SARS-COV-2, RAPID: NOT DETECTED
SARS-COV-2: NORMAL
SEG NEUTROPHILS: 65 % (ref 36–65)
SEGMENTED NEUTROPHILS ABSOLUTE COUNT: 2.88 K/UL (ref 1.5–8.1)
SODIUM BLD-SCNC: 136 MMOL/L (ref 135–144)
SOURCE: NORMAL
TEST INFORMATION: ABNORMAL
TOTAL IRON BINDING CAPACITY: 389 UG/DL (ref 250–450)
TRICYCLIC ANTIDEPRESSANTS, UR: ABNORMAL
TRIGL SERPL-MCNC: 68 MG/DL
TROPONIN INTERP: ABNORMAL
TROPONIN INTERP: ABNORMAL
TROPONIN INTERP: NORMAL
TROPONIN T: ABNORMAL NG/ML
TROPONIN T: ABNORMAL NG/ML
TROPONIN T: NORMAL NG/ML
TROPONIN, HIGH SENSITIVITY: 14 NG/L (ref 0–14)
TROPONIN, HIGH SENSITIVITY: 18 NG/L (ref 0–14)
TROPONIN, HIGH SENSITIVITY: 36 NG/L (ref 0–14)
UNSATURATED IRON BINDING CAPACITY: 321 UG/DL (ref 112–347)
VLDLC SERPL CALC-MCNC: NORMAL MG/DL (ref 1–30)
WBC # BLD: 4.4 K/UL (ref 3.5–11.3)
WBC # BLD: ABNORMAL 10*3/UL

## 2020-07-22 PROCEDURE — 84132 ASSAY OF SERUM POTASSIUM: CPT

## 2020-07-22 PROCEDURE — 2580000003 HC RX 258: Performed by: STUDENT IN AN ORGANIZED HEALTH CARE EDUCATION/TRAINING PROGRAM

## 2020-07-22 PROCEDURE — 71045 X-RAY EXAM CHEST 1 VIEW: CPT

## 2020-07-22 PROCEDURE — 80048 BASIC METABOLIC PNL TOTAL CA: CPT

## 2020-07-22 PROCEDURE — 93458 L HRT ARTERY/VENTRICLE ANGIO: CPT | Performed by: INTERNAL MEDICINE

## 2020-07-22 PROCEDURE — C1769 GUIDE WIRE: HCPCS

## 2020-07-22 PROCEDURE — 4A023N7 MEASUREMENT OF CARDIAC SAMPLING AND PRESSURE, LEFT HEART, PERCUTANEOUS APPROACH: ICD-10-PCS | Performed by: INTERNAL MEDICINE

## 2020-07-22 PROCEDURE — 93005 ELECTROCARDIOGRAM TRACING: CPT | Performed by: EMERGENCY MEDICINE

## 2020-07-22 PROCEDURE — 027035Z DILATION OF CORONARY ARTERY, ONE ARTERY WITH TWO DRUG-ELUTING INTRALUMINAL DEVICES, PERCUTANEOUS APPROACH: ICD-10-PCS | Performed by: INTERNAL MEDICINE

## 2020-07-22 PROCEDURE — 84484 ASSAY OF TROPONIN QUANT: CPT

## 2020-07-22 PROCEDURE — 99223 1ST HOSP IP/OBS HIGH 75: CPT | Performed by: INTERNAL MEDICINE

## 2020-07-22 PROCEDURE — B2111ZZ FLUOROSCOPY OF MULTIPLE CORONARY ARTERIES USING LOW OSMOLAR CONTRAST: ICD-10-PCS | Performed by: INTERNAL MEDICINE

## 2020-07-22 PROCEDURE — 96374 THER/PROPH/DIAG INJ IV PUSH: CPT

## 2020-07-22 PROCEDURE — 80307 DRUG TEST PRSMV CHEM ANLYZR: CPT

## 2020-07-22 PROCEDURE — 6370000000 HC RX 637 (ALT 250 FOR IP): Performed by: STUDENT IN AN ORGANIZED HEALTH CARE EDUCATION/TRAINING PROGRAM

## 2020-07-22 PROCEDURE — 85730 THROMBOPLASTIN TIME PARTIAL: CPT

## 2020-07-22 PROCEDURE — 6370000000 HC RX 637 (ALT 250 FOR IP): Performed by: EMERGENCY MEDICINE

## 2020-07-22 PROCEDURE — B2151ZZ FLUOROSCOPY OF LEFT HEART USING LOW OSMOLAR CONTRAST: ICD-10-PCS | Performed by: INTERNAL MEDICINE

## 2020-07-22 PROCEDURE — 80061 LIPID PANEL: CPT

## 2020-07-22 PROCEDURE — 6360000002 HC RX W HCPCS

## 2020-07-22 PROCEDURE — 99285 EMERGENCY DEPT VISIT HI MDM: CPT

## 2020-07-22 PROCEDURE — 6360000004 HC RX CONTRAST MEDICATION

## 2020-07-22 PROCEDURE — 83540 ASSAY OF IRON: CPT

## 2020-07-22 PROCEDURE — 83550 IRON BINDING TEST: CPT

## 2020-07-22 PROCEDURE — 36415 COLL VENOUS BLD VENIPUNCTURE: CPT

## 2020-07-22 PROCEDURE — 92928 PRQ TCAT PLMT NTRAC ST 1 LES: CPT | Performed by: INTERNAL MEDICINE

## 2020-07-22 PROCEDURE — U0002 COVID-19 LAB TEST NON-CDC: HCPCS

## 2020-07-22 PROCEDURE — C1894 INTRO/SHEATH, NON-LASER: HCPCS

## 2020-07-22 PROCEDURE — 2000000000 HC ICU R&B

## 2020-07-22 PROCEDURE — 83880 ASSAY OF NATRIURETIC PEPTIDE: CPT

## 2020-07-22 PROCEDURE — 2580000003 HC RX 258: Performed by: EMERGENCY MEDICINE

## 2020-07-22 PROCEDURE — C1874 STENT, COATED/COV W/DEL SYS: HCPCS

## 2020-07-22 PROCEDURE — C1725 CATH, TRANSLUMIN NON-LASER: HCPCS

## 2020-07-22 PROCEDURE — C1887 CATHETER, GUIDING: HCPCS

## 2020-07-22 PROCEDURE — 2500000003 HC RX 250 WO HCPCS

## 2020-07-22 PROCEDURE — 6360000002 HC RX W HCPCS: Performed by: EMERGENCY MEDICINE

## 2020-07-22 PROCEDURE — 2500000003 HC RX 250 WO HCPCS: Performed by: STUDENT IN AN ORGANIZED HEALTH CARE EDUCATION/TRAINING PROGRAM

## 2020-07-22 PROCEDURE — 85379 FIBRIN DEGRADATION QUANT: CPT

## 2020-07-22 PROCEDURE — 6370000000 HC RX 637 (ALT 250 FOR IP)

## 2020-07-22 PROCEDURE — 85025 COMPLETE CBC W/AUTO DIFF WBC: CPT

## 2020-07-22 PROCEDURE — 2709999900 HC NON-CHARGEABLE SUPPLY

## 2020-07-22 PROCEDURE — 81025 URINE PREGNANCY TEST: CPT

## 2020-07-22 PROCEDURE — 82728 ASSAY OF FERRITIN: CPT

## 2020-07-22 RX ORDER — HEPARIN SODIUM 10000 [USP'U]/100ML
12 INJECTION, SOLUTION INTRAVENOUS CONTINUOUS
Status: DISCONTINUED | OUTPATIENT
Start: 2020-07-22 | End: 2020-07-22

## 2020-07-22 RX ORDER — SODIUM CHLORIDE 0.9 % (FLUSH) 0.9 %
10 SYRINGE (ML) INJECTION PRN
Status: DISCONTINUED | OUTPATIENT
Start: 2020-07-22 | End: 2020-07-23 | Stop reason: HOSPADM

## 2020-07-22 RX ORDER — HEPARIN SODIUM 1000 [USP'U]/ML
2000 INJECTION, SOLUTION INTRAVENOUS; SUBCUTANEOUS PRN
Status: DISCONTINUED | OUTPATIENT
Start: 2020-07-22 | End: 2020-07-23 | Stop reason: HOSPADM

## 2020-07-22 RX ORDER — HEPARIN SODIUM 1000 [USP'U]/ML
4000 INJECTION, SOLUTION INTRAVENOUS; SUBCUTANEOUS PRN
Status: DISCONTINUED | OUTPATIENT
Start: 2020-07-22 | End: 2020-07-23 | Stop reason: HOSPADM

## 2020-07-22 RX ORDER — ACETAMINOPHEN 325 MG/1
650 TABLET ORAL EVERY 4 HOURS PRN
Status: DISCONTINUED | OUTPATIENT
Start: 2020-07-22 | End: 2020-07-22

## 2020-07-22 RX ORDER — ACETAMINOPHEN 325 MG/1
650 TABLET ORAL EVERY 4 HOURS PRN
Status: DISCONTINUED | OUTPATIENT
Start: 2020-07-22 | End: 2020-07-23 | Stop reason: HOSPADM

## 2020-07-22 RX ORDER — HYDRALAZINE HYDROCHLORIDE 20 MG/ML
10 INJECTION INTRAMUSCULAR; INTRAVENOUS EVERY 6 HOURS PRN
Status: DISCONTINUED | OUTPATIENT
Start: 2020-07-22 | End: 2020-07-23 | Stop reason: HOSPADM

## 2020-07-22 RX ORDER — HYDRALAZINE HYDROCHLORIDE 20 MG/ML
INJECTION INTRAMUSCULAR; INTRAVENOUS
Status: COMPLETED
Start: 2020-07-22 | End: 2020-07-22

## 2020-07-22 RX ORDER — LISINOPRIL 10 MG/1
10 TABLET ORAL DAILY
Status: DISCONTINUED | OUTPATIENT
Start: 2020-07-22 | End: 2020-07-23 | Stop reason: HOSPADM

## 2020-07-22 RX ORDER — ASPIRIN 81 MG/1
81 TABLET ORAL DAILY
Status: DISCONTINUED | OUTPATIENT
Start: 2020-07-23 | End: 2020-07-23 | Stop reason: HOSPADM

## 2020-07-22 RX ORDER — ASPIRIN 81 MG/1
324 TABLET, CHEWABLE ORAL ONCE
Status: COMPLETED | OUTPATIENT
Start: 2020-07-22 | End: 2020-07-22

## 2020-07-22 RX ORDER — POLYETHYLENE GLYCOL 3350 17 G/17G
17 POWDER, FOR SOLUTION ORAL DAILY PRN
Status: DISCONTINUED | OUTPATIENT
Start: 2020-07-22 | End: 2020-07-23 | Stop reason: HOSPADM

## 2020-07-22 RX ORDER — KETOROLAC TROMETHAMINE 15 MG/ML
15 INJECTION, SOLUTION INTRAMUSCULAR; INTRAVENOUS ONCE
Status: COMPLETED | OUTPATIENT
Start: 2020-07-22 | End: 2020-07-22

## 2020-07-22 RX ORDER — SODIUM CHLORIDE 9 MG/ML
INJECTION, SOLUTION INTRAVENOUS CONTINUOUS
Status: DISCONTINUED | OUTPATIENT
Start: 2020-07-22 | End: 2020-07-23 | Stop reason: HOSPADM

## 2020-07-22 RX ORDER — AMLODIPINE BESYLATE 5 MG/1
5 TABLET ORAL DAILY
Status: DISCONTINUED | OUTPATIENT
Start: 2020-07-22 | End: 2020-07-23 | Stop reason: HOSPADM

## 2020-07-22 RX ORDER — ATORVASTATIN CALCIUM 80 MG/1
40 TABLET, FILM COATED ORAL NIGHTLY
Status: DISCONTINUED | OUTPATIENT
Start: 2020-07-22 | End: 2020-07-23 | Stop reason: HOSPADM

## 2020-07-22 RX ORDER — SODIUM CHLORIDE 0.9 % (FLUSH) 0.9 %
10 SYRINGE (ML) INJECTION EVERY 12 HOURS SCHEDULED
Status: DISCONTINUED | OUTPATIENT
Start: 2020-07-22 | End: 2020-07-23 | Stop reason: HOSPADM

## 2020-07-22 RX ORDER — SODIUM CHLORIDE 9 MG/ML
INJECTION, SOLUTION INTRAVENOUS CONTINUOUS
Status: CANCELLED | OUTPATIENT
Start: 2020-07-22

## 2020-07-22 RX ORDER — ACETAMINOPHEN 325 MG/1
650 TABLET ORAL EVERY 6 HOURS PRN
Status: DISCONTINUED | OUTPATIENT
Start: 2020-07-22 | End: 2020-07-23 | Stop reason: HOSPADM

## 2020-07-22 RX ORDER — LISINOPRIL 10 MG/1
10 TABLET ORAL DAILY
Status: DISCONTINUED | OUTPATIENT
Start: 2020-07-23 | End: 2020-07-22

## 2020-07-22 RX ORDER — LISINOPRIL 10 MG/1
5 TABLET ORAL DAILY
Status: DISCONTINUED | OUTPATIENT
Start: 2020-07-23 | End: 2020-07-22

## 2020-07-22 RX ORDER — SODIUM CHLORIDE 0.9 % (FLUSH) 0.9 %
10 SYRINGE (ML) INJECTION EVERY 12 HOURS SCHEDULED
Status: DISCONTINUED | OUTPATIENT
Start: 2020-07-22 | End: 2020-07-23

## 2020-07-22 RX ORDER — HEPARIN SODIUM 1000 [USP'U]/ML
4000 INJECTION, SOLUTION INTRAVENOUS; SUBCUTANEOUS ONCE
Status: COMPLETED | OUTPATIENT
Start: 2020-07-22 | End: 2020-07-22

## 2020-07-22 RX ORDER — AMLODIPINE BESYLATE 5 MG/1
5 TABLET ORAL DAILY
Status: DISCONTINUED | OUTPATIENT
Start: 2020-07-23 | End: 2020-07-22

## 2020-07-22 RX ORDER — HEPARIN SODIUM 5000 [USP'U]/ML
5000 INJECTION, SOLUTION INTRAVENOUS; SUBCUTANEOUS EVERY 8 HOURS SCHEDULED
Status: DISCONTINUED | OUTPATIENT
Start: 2020-07-23 | End: 2020-07-23 | Stop reason: HOSPADM

## 2020-07-22 RX ORDER — PROMETHAZINE HYDROCHLORIDE 25 MG/1
12.5 TABLET ORAL EVERY 6 HOURS PRN
Status: DISCONTINUED | OUTPATIENT
Start: 2020-07-22 | End: 2020-07-23 | Stop reason: HOSPADM

## 2020-07-22 RX ORDER — CYCLOBENZAPRINE HCL 5 MG
5 TABLET ORAL 3 TIMES DAILY PRN
COMMUNITY
End: 2020-09-22 | Stop reason: ALTCHOICE

## 2020-07-22 RX ORDER — ACETAMINOPHEN 650 MG/1
650 SUPPOSITORY RECTAL EVERY 6 HOURS PRN
Status: DISCONTINUED | OUTPATIENT
Start: 2020-07-22 | End: 2020-07-23 | Stop reason: HOSPADM

## 2020-07-22 RX ORDER — ONDANSETRON 2 MG/ML
4 INJECTION INTRAMUSCULAR; INTRAVENOUS EVERY 6 HOURS PRN
Status: DISCONTINUED | OUTPATIENT
Start: 2020-07-22 | End: 2020-07-23 | Stop reason: HOSPADM

## 2020-07-22 RX ORDER — ASPIRIN 81 MG/1
324 TABLET, CHEWABLE ORAL ONCE
Status: DISCONTINUED | OUTPATIENT
Start: 2020-07-22 | End: 2020-07-23 | Stop reason: HOSPADM

## 2020-07-22 RX ORDER — POTASSIUM CHLORIDE 7.45 MG/ML
10 INJECTION INTRAVENOUS PRN
Status: DISCONTINUED | OUTPATIENT
Start: 2020-07-22 | End: 2020-07-23 | Stop reason: HOSPADM

## 2020-07-22 RX ORDER — POTASSIUM CHLORIDE 20 MEQ/1
40 TABLET, EXTENDED RELEASE ORAL PRN
Status: DISCONTINUED | OUTPATIENT
Start: 2020-07-22 | End: 2020-07-23 | Stop reason: HOSPADM

## 2020-07-22 RX ADMIN — HYDRALAZINE HYDROCHLORIDE 10 MG: 20 INJECTION INTRAMUSCULAR; INTRAVENOUS at 16:00

## 2020-07-22 RX ADMIN — FAMOTIDINE 20 MG: 10 INJECTION, SOLUTION INTRAVENOUS at 16:36

## 2020-07-22 RX ADMIN — ACETAMINOPHEN 650 MG: 325 TABLET ORAL at 11:37

## 2020-07-22 RX ADMIN — LISINOPRIL 10 MG: 10 TABLET ORAL at 16:35

## 2020-07-22 RX ADMIN — SODIUM CHLORIDE: 9 INJECTION, SOLUTION INTRAVENOUS at 16:04

## 2020-07-22 RX ADMIN — ATORVASTATIN CALCIUM 40 MG: 80 TABLET, FILM COATED ORAL at 20:52

## 2020-07-22 RX ADMIN — HEPARIN SODIUM AND DEXTROSE 12 UNITS/KG/HR: 10000; 5 INJECTION INTRAVENOUS at 12:22

## 2020-07-22 RX ADMIN — HEPARIN SODIUM 4000 UNITS: 1000 INJECTION INTRAVENOUS; SUBCUTANEOUS at 12:22

## 2020-07-22 RX ADMIN — SODIUM CHLORIDE, PRESERVATIVE FREE 10 ML: 5 INJECTION INTRAVENOUS at 20:52

## 2020-07-22 RX ADMIN — SODIUM CHLORIDE, PRESERVATIVE FREE 10 ML: 5 INJECTION INTRAVENOUS at 11:29

## 2020-07-22 RX ADMIN — ASPIRIN 81 MG 324 MG: 81 TABLET ORAL at 08:36

## 2020-07-22 RX ADMIN — KETOROLAC TROMETHAMINE 15 MG: 15 INJECTION, SOLUTION INTRAMUSCULAR; INTRAVENOUS at 08:37

## 2020-07-22 RX ADMIN — KETOROLAC TROMETHAMINE 15 MG: 15 INJECTION, SOLUTION INTRAMUSCULAR; INTRAVENOUS at 11:37

## 2020-07-22 RX ADMIN — AMLODIPINE BESYLATE 5 MG: 5 TABLET ORAL at 16:36

## 2020-07-22 ASSESSMENT — PAIN SCALES - GENERAL
PAINLEVEL_OUTOF10: 8
PAINLEVEL_OUTOF10: 6
PAINLEVEL_OUTOF10: 7
PAINLEVEL_OUTOF10: 5
PAINLEVEL_OUTOF10: 0
PAINLEVEL_OUTOF10: 10
PAINLEVEL_OUTOF10: 0

## 2020-07-22 ASSESSMENT — ENCOUNTER SYMPTOMS
ABDOMINAL PAIN: 0
PHOTOPHOBIA: 0
BACK PAIN: 0
VOMITING: 0
DIARRHEA: 0
EYE PAIN: 0
EYE DISCHARGE: 0
RHINORRHEA: 0
COUGH: 0
NAUSEA: 0
SHORTNESS OF BREATH: 0

## 2020-07-22 ASSESSMENT — PAIN DESCRIPTION - DESCRIPTORS
DESCRIPTORS: STABBING;RADIATING
DESCRIPTORS: SQUEEZING
DESCRIPTORS: TIGHTNESS

## 2020-07-22 ASSESSMENT — PAIN DESCRIPTION - PAIN TYPE
TYPE: ACUTE PAIN
TYPE: ACUTE PAIN
TYPE: CHRONIC PAIN

## 2020-07-22 ASSESSMENT — PAIN DESCRIPTION - FREQUENCY
FREQUENCY: INTERMITTENT

## 2020-07-22 ASSESSMENT — PAIN DESCRIPTION - ONSET
ONSET: OTHER (COMMENT)
ONSET: AWAKENED FROM SLEEP
ONSET: AWAKENED FROM SLEEP

## 2020-07-22 ASSESSMENT — PAIN DESCRIPTION - LOCATION
LOCATION: CHEST
LOCATION: CHEST;SHOULDER;ARM
LOCATION: SHOULDER;CHEST

## 2020-07-22 ASSESSMENT — PAIN DESCRIPTION - ORIENTATION
ORIENTATION: LEFT

## 2020-07-22 ASSESSMENT — PAIN - FUNCTIONAL ASSESSMENT: PAIN_FUNCTIONAL_ASSESSMENT: PREVENTS OR INTERFERES SOME ACTIVE ACTIVITIES AND ADLS

## 2020-07-22 ASSESSMENT — PAIN DESCRIPTION - PROGRESSION: CLINICAL_PROGRESSION: GRADUALLY WORSENING

## 2020-07-22 NOTE — CARE COORDINATION
Case Management Initial Discharge Plan  Ivis Left,             Met with:patient to discuss discharge plans. Information verified: address, contacts, phone number, , insurance Yes    Emergency Contact/Next of Kin name & number: Sarmad Bean [phone]    [facility]P: [facility]  Date of last visit: new pt    Insurance Provider: [employer]    Discharge Planning    Living Arrangements:  Children   Support Systems:  Family Members    Home has 1 stories  3 stairs to climb to get into front door, 0 stairs to climb to reach second floor  Location of bedroom/bathroom in home main    Patient able to perform ADL's:Independent    Current Services (outpatient & in home) none  DME equipment: none  DME provider: none    Receiving oral anticoagulation therapy? No    If indicated:   Physician managing anticoagulation treatment: none  Where does patient obtain lab work for ATC treatment? none      Potential Assistance Needed:       Patient agreeable to home care: No  [place] of choice provided:  n/a    Prior SNF/Rehab Placement and Facility: none  Agreeable to SNF/Rehab: No  [place] of choice provided: n/a     Evaluation: no    Expected Discharge date:       Patient expects to be discharged to:  home  Follow Up Appointment: Best Day/ Time:      Transportation provider: family  Transportation arrangements needed for discharge: No    Readmission Risk              Risk of Unplanned Readmission:        0             Does patient have a readmission risk score greater than 14?: not calculated  If yes, follow-up appointment must be made within 7 days of discharge. Goals of Care:  To go home well      Discharge Plan: Home independently at this time no skilled needs, has transportation, has new pt appt for pcp          Electronically signed by Marques Yates RN on 20 at 11:31 AM EDT

## 2020-07-22 NOTE — PROGRESS NOTES
SENIOR NOTE    This is a 38 y.o. female admitted 7/22/2020 for Chest pain [R07.9]  Chest pain [R07.9]  Chest pain [R07.9]. See H&P of admitting/intern resident for more details. 59-year-old female presents to the hospital with sudden onset left-sided chest pain and pressure. Patient states that she was sleeping when she suddenly woke up and started having severe pressure-like pain in the chest radiating to her left arm. Pain was so severe that patient cannot even move. Patient states that she has panic attacks in the past starting in April but this did not seem to be like any other episode. She states that she had 1 or 2 episodes similar in nature on exertion before. Patient does not have any history of heart disease, no family history of heart disease  Risk factors include smoking and hypertension. Patient is on medication for hypertension. Recent lab work-TSH 1.3, A1c 5.6. In the ER patient was noted to have T wave inversion in V2, V3, V4 concerning for Wellens syndrome, concerning for ischemia, plan for heart cath by cardiology. BMP:   Recent Labs     07/22/20  0832      K 3.6*      CO2 24   BUN 9   CREATININE 0.53   GLUCOSE 121*     CBC: )  Recent Labs     07/22/20  0832   WBC 4.4   HGB 10.7*   HCT 34.4*             Assessment    Principal Problem:    Chest pain  Active Problems:    Iron deficiency anemia secondary to inadequate dietary iron intake    Essential hypertension  Resolved Problems:    * No resolved hospital problems. *        Plan     Chest pain  EKG concerning for anterolateral ischemia, plan for heart cath today  Aspirin 324 mg loading dose given. Start heparin drip  Trend troponins    Hypertension  Resume lisinopril and amlodipine from tomorrow.     Chronic smoker, Marijuana abuse    Chronic anemia with hemoglobin of 10.7-continue to trend, follow-up with iron studies    H/o Panic episodes and psychiatric disorder    Keep n.p.o.  DVT prophylaxis as patient is already on heparin letty Owen MD            Department of Internal Medicine  [place], [place]         7/22/2020, 1:38 PM
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
## 2020-07-22 NOTE — H&P
[facility]  CDU / OBSERVATION ENCOUNTER  ATTENDING NOTE     Pt Name: Tomasz Gilmore  MRN: [id number]  [place] 1980  Date of evaluation: 7/22/20  Patient's PCP is : No primary care provider on file. CHIEF COMPLAINT       Chief Complaint   Patient presents with    Chest Pain      left sided been hurting for weeks         HISTORY OF PRESENT ILLNESS    Tomasz Gilmore is a 38 y.o. female who presents with complaints of left-sided chest pain. Patient has had anginal type discomfort over the past several days to weeks. She has had 3 episodes today. She describes pain with radiation to her arms bilaterally associated with shortness of breath. Patient has EKG changes that are concerning. Location/Symptom: Anginal discomfort  Timing/Onset: Just prior to presentation  Provocation: Unclear  Quality: Anginal discomfort  Radiation: Bilateral extremities upper  Severity: Moderate to severe when present  Timing/Duration: This prior to presentation  Modifying Factors: Unclear    REVIEW OF SYSTEMS        General ROS - No fevers, No malaise   Ophthalmic ROS - No discharge, No changes in vision  ENT ROS -  No sore throat, No rhinorrhea,   Respiratory ROS - no shortness of breath, no cough, no  wheezing  Cardiovascular ROS -chest pain and dyspnea. Anginal discomfort over the past several weeks. Gastrointestinal ROS - No abdominal pain, no nausea or vomiting, no change in bowel habits, no black or bloody stools  Genito-Urinary ROS - No dysuria, trouble voiding, or hematuria  Musculoskeletal ROS - No myalgias, No arthalgias  Neurological ROS - No headache, no dizziness/lightheadedness, No focal weakness, no loss of sensation  Dermatological ROS - No lesions, No rash     (PQRS) Advance directives on face sheet per hospital policy. No change unless specifically mentioned in chart    PAST MEDICAL HISTORY    has a past medical history of Anxiety and Hypertension.     I have reviewed the past medical history with the patient and it is  pertinent to this complaint. SURGICAL HISTORY      has a past surgical history that includes  section; Carpal tunnel release; and Achilles tendon surgery. I have reviewed and agree with Surgical History entered and it is  pertinent to this complaint. CURRENT MEDICATIONS     sodium chloride flush 0.9 % injection 10 mL, 2 times per day  sodium chloride flush 0.9 % injection 10 mL, PRN  acetaminophen (TYLENOL) tablet 650 mg, Q4H PRN        All medication charted and reviewed. ALLERGIES     has No Known Allergies. FAMILY HISTORY     has no family status information on file. family history is not on file. The patient denies any pertinent family history. I have reviewed and agree with the family history entered. I have reviewed the Family History and it is not significant to the case    SOCIAL HISTORY      reports that she has been smoking. She has been smoking about 0.50 packs per day. She has never used smokeless tobacco. She reports previous alcohol use. She reports current drug use. Drug: Marijuana. I have reviewed and agree with all Social.  There are no  concerns for substance abuse/use. PHYSICAL EXAM     INITIAL VITALS:  height is 5' 4\" (1.626 m) and weight is 160 lb (72.6 kg). Her oral temperature is 98.6 °F (37 °C). Her blood pressure is 152/100 (abnormal) and her pulse is 78. Her respiration is 20 and oxygen saturation is 96%.       CONSTITUTIONAL: AOx4, no apparent distress, appears stated age     HEAD: normocephalic, atraumatic   EYES: PERRLA, EOMI    ENT: moist mucous membranes, uvula midline   NECK: supple, symmetric   BACK: symmetric   LUNGS: clear to auscultation bilaterally   CARDIOVASCULAR: regular rate and rhythm, no murmurs, rubs or gallops   ABDOMEN: soft, non-tender, non-distended with normal active bowel sounds   NEUROLOGIC:  MAEx4, no focal sensory or motor deficits   MUSCULOSKELETAL: no clubbing, cyanosis or edema   SKIN: no rash or wounds       DIFFERENTIAL DIAGNOSIS/MDM:     Patient with fairly significant discomfort consistent with anginal pain. Patient had EKG which was concerning for Wellens syndrome. Discussed directly with cardiology regarding catheterization lab. DIAGNOSTIC RESULTS     EKG: All EKG's are interpreted by the Observation Physician who either signs or Co-signs this chart in the absence of a cardiologist.    EKG Interpretation    Interpreted by observation physician    Rhythm: normal sinus   Rate: normal  Axis: normal  Ectopy: none  Conduction: normal  ST Segments: Wellens pattern  T Waves: Wellens pattern  Q Waves: none    Clinical Impression: no acute changes    Rose Ross, MD    EKG concerning for acute ischemia. Discussed directly with cardiology. RADIOLOGY:   I directly visualized the following  images and reviewed the radiologist interpretations:    Xr Chest Portable    Result Date: 7/22/2020  EXAMINATION: ONE XRAY VIEW OF THE CHEST 7/22/2020 8:19 am COMPARISON: None. HISTORY: ORDERING SYSTEM PROVIDED HISTORY: chest pain TECHNOLOGIST PROVIDED HISTORY: chest pain Reason for Exam: Chest pain/ AP erect/  gp used Acuity: Unknown Type of Exam: Unknown FINDINGS: Shallow inspiration. Indistinct left costophrenic sulcus possibly from poor penetration, effusion, or atelectasis. No consolidation or pneumothorax. The heart is enlarged but magnified by technique. Shallow inspiratory radiograph with somewhat obscured left costophrenic sulcus possibly from trace effusion or atelectasis. No consolidative opacities. LABS:  I have reviewed and interpreted all available lab results.   Labs Reviewed   BASIC METABOLIC PANEL - Abnormal; Notable for the following components:       Result Value    Glucose 121 (*)     Potassium 3.6 (*)     Anion Gap 8 (*)     All other components within normal limits   CBC WITH AUTO DIFFERENTIAL - Abnormal; Notable for the following components:    RBC 3.73 (*)     Hemoglobin

## 2020-07-22 NOTE — ED PROVIDER NOTES
[id number]  [address] [facility]  eMERGENCY dEPARTMENT eNCOUnter      Pt Name: Meghan Dejesus  MRN: [id number]  [place] 1980  Date of evaluation: 20    CHIEF COMPLAINT       Chief Complaint   Patient presents with    Chest Pain      left sided been hurting for weeks         HISTORY OF PRESENT ILLNESS   Meghan Dejesus is a 38 y.o. female who presents with severe left-sided chest pain. She states that it started this morning when she got out of bed. She says she sprang up and had severe chest pain. Patient says that she also had some shortness of breath with this. Patient no longer is having any shortness of breath. Patient has no fever, chills, nausea, vomiting, diarrhea. Patient has no diaphoresis. Patient symptoms are constant, worse with movement, worse with palpation over the left shoulder, chest, arm. Patient says the pain is sharp. Patient has no cardiac history. REVIEW OF SYSTEMS       Review of Systems   Constitutional: Negative for chills and fever. HENT: Negative for rhinorrhea and tinnitus. Eyes: Negative for photophobia, pain and discharge. Respiratory: Negative for cough and shortness of breath. Cardiovascular: Positive for chest pain. Negative for palpitations. Gastrointestinal: Negative for abdominal pain, diarrhea, nausea and vomiting. Genitourinary: Negative for dysuria, frequency and hematuria. Musculoskeletal: Negative for arthralgias and myalgias. Shoulder pain, scapular pain, left arm pain. Skin: Negative for rash and wound. Neurological: Negative for dizziness, syncope and headaches. Psychiatric/Behavioral: Negative for self-injury and suicidal ideas. PAST MEDICAL HISTORY    has a past medical history of Anxiety and Hypertension. SURGICAL HISTORY      has a past surgical history that includes  section; Carpal tunnel release; and Achilles tendon surgery.     CURRENT MEDICATIONS       Previous Medications AMLODIPINE (NORVASC) 5 MG TABLET    Take 1 tablet by mouth daily    CYCLOBENZAPRINE (FLEXERIL) 5 MG TABLET    Take 5 mg by mouth 3 times daily as needed for Muscle spasms    LISINOPRIL (PRINIVIL;ZESTRIL) 10 MG TABLET    Take 1 tablet by mouth daily       ALLERGIES     has No Known Allergies. FAMILY HISTORY   has no family status information on file. family history is not on file. SOCIAL HISTORY      reports that she has been smoking. She has been smoking about 0.50 packs per day. She has never used smokeless tobacco. She reports previous alcohol use. She reports current drug use. Drug: Marijuana. PHYSICAL EXAM     INITIAL VITALS:  height is 5' 4\" (1.626 m) and weight is 160 lb (72.6 kg). Her oral temperature is 98.6 °F (37 °C). Her blood pressure is 152/100 (abnormal) and her pulse is 78. Her respiration is 20 and oxygen saturation is 96%. Physical Exam  Constitutional:       General: She is not in acute distress. Appearance: She is well-developed. She is not diaphoretic. HENT:      Head: Normocephalic and atraumatic. Eyes:      General:         Right eye: No discharge. Left eye: No discharge. Pupils: Pupils are equal, round, and reactive to light. Neck:      Vascular: No JVD. Trachea: No tracheal deviation. Cardiovascular:      Rate and Rhythm: Normal rate and regular rhythm. Heart sounds: Normal heart sounds. No murmur. No friction rub. No gallop. Pulmonary:      Effort: Pulmonary effort is normal. No respiratory distress. Breath sounds: Normal breath sounds. No wheezing or rales. Chest:      Chest wall: No tenderness. Abdominal:      General: Bowel sounds are normal. There is no distension. Palpations: Abdomen is soft. There is no mass. Tenderness: There is no abdominal tenderness. There is no guarding or rebound. Musculoskeletal:         General: No tenderness. Skin:     General: Skin is warm and dry.       Findings: No erythema or rash.      Comments: Significant hyperesthesia, exquisite tenderness over her left anterior chest, left trapezius, left arm. Neurological:      Mental Status: She is alert and oriented to person, place, and time. Cranial Nerves: No cranial nerve deficit. Psychiatric:         Behavior: Behavior normal.         Thought Content: Thought content normal.       Patient well-appearing. DIFFERENTIAL DIAGNOSIS/MDM:   Concern for musculoskeletal chest pain however will do ACS screening. Plan for pain control, will reevaluate. If negative work-up consider discharge. With new t wave inversions will admit patient to obs for further workup and cardiology consult. DIAGNOSTIC RESULTS     EKG: All EKG's are interpreted by the Emergency Department Physician who either signs or Co-signs this chart in the absenceof a cardiologist.    EKG shows normal sinus rhythm with rate of 72 bpm.  There is normal axis. No ST elevation or depression. Patient has inversion of T waves in anterior septal and lateral leads except for V6. No blocks or arrhythmias. Nonspecific EKG. RADIOLOGY:   I directly visualized the following  images and reviewed theradiologist interpretations    Xr Chest Portable    Result Date: 7/22/2020  EXAMINATION: ONE XRAY VIEW OF THE CHEST 7/22/2020 8:19 am COMPARISON: None. HISTORY: ORDERING SYSTEM PROVIDED HISTORY: chest pain TECHNOLOGIST PROVIDED HISTORY: chest pain Reason for Exam: Chest pain/ AP erect/  gp used Acuity: Unknown Type of Exam: Unknown FINDINGS: Shallow inspiration. Indistinct left costophrenic sulcus possibly from poor penetration, effusion, or atelectasis. No consolidation or pneumothorax. The heart is enlarged but magnified by technique. Shallow inspiratory radiograph with somewhat obscured left costophrenic sulcus possibly from trace effusion or atelectasis. No consolidative opacities.          ED BEDSIDE ULTRASOUND:   none    LABS:  Labs Reviewed   BASIC METABOLIC PANEL - Abnormal; Notable for the following components:       Result Value    Glucose 121 (*)     Potassium 3.6 (*)     Anion Gap 8 (*)     All other components within normal limits   CBC WITH AUTO DIFFERENTIAL - Abnormal; Notable for the following components:    RBC 3.73 (*)     Hemoglobin 10.7 (*)     Hematocrit 34.4 (*)     RDW 15.1 (*)     Lymphocytes 23 (*)     Eosinophils % 5 (*)     Absolute Lymph # 1.03 (*)     All other components within normal limits   TROPONIN - Abnormal; Notable for the following components:    Troponin, High Sensitivity 18 (*)     All other components within normal limits   BRAIN NATRIURETIC PEPTIDE   TROPONIN   D-DIMER, QUANTITATIVE   APTT   APTT   APTT   URINE DRUG SCREEN   COVID-19         EMERGENCY DEPARTMENT COURSE:   Vitals:    Vitals:    07/22/20 0954 07/22/20 1001 07/22/20 1016 07/22/20 1032   BP: 124/80 (!) 135/90 (!) 148/99 (!) 152/100   Pulse: 77 86 71 78   Resp: 21 15 20 20   Temp:       TempSrc:       SpO2: 98% 99% 97% 96%   Weight:       Height:         As above, reviewed. CRITICAL CARE:  none    CONSULTS:  none  PROCEDURES:  none    FINAL IMPRESSION      1. Chest pain, unspecified type    2. T wave inversion in electrocardiogram    3. Chest wall pain          DISPOSITION/PLAN   Admitted      PATIENT REFERRED TO:  No follow-up provider specified.     DISCHARGE MEDICATIONS:     New Prescriptions    No medications on file       (Please note that portions of this note were completed with a voice recognition program.  Efforts were made to edit thedictations but occasionally words are mis-transcribed.)    Shaun Lainez MD  Emergency Medicine              Shaun Lainez MD  07/22/20 5992

## 2020-07-22 NOTE — ED NOTES
Writer went into patients room- patient visibly upset and crying. Patient states she is very scared and anxious. Writer reassured pt.      Jacquelin Lee RN  07/22/20 6481

## 2020-07-22 NOTE — PROGRESS NOTES
RAPID Covid 19 swab taken from right nare, labeled, placed in red dot bag, and handed off to second healthcare worker outside of room for transport to laboratory per hospital policy and procedure. Patient tolerated procedure fairly well.

## 2020-07-22 NOTE — PROGRESS NOTES
CDU transfer summary        Patient:  Yadi Rosado  YOB: 1980    MRN: [id number]   Acct: [de-identified]    Primary Care Physician: No primary care provider on file. 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 Patient transferred to medicine service.        Medication List      ASK your doctor about these medications    amLODIPine 5 MG tablet  Commonly known as:  NORVASC  Take 1 tablet by mouth daily     cyclobenzaprine 5 MG tablet  Commonly known as:  FLEXERIL     lisinopril 10 MG tablet  Commonly known as:  PRINIVIL;ZESTRIL  Take 1 tablet by mouth daily            Diet:  DIET CARDIAC;, advance as tolerated     Activity:  As tolerated    Consultants: IP CONSULT TO CARDIOLOGY  IP CONSULT TO CASE MANAGEMENT  IP CONSULT TO CARDIAC REHAB    Procedures: Cardiac catheterization    Diagnostic Test:   Results for orders placed or performed during the hospital encounter of 38/64/20   Basic Metabolic Panel   Result Value Ref Range    Glucose 121 (H) 70 - 99 mg/dL    BUN 9 6 - 20 mg/dL    CREATININE 0.53 0.50 - 0.90 mg/dL    Bun/Cre Ratio NOT REPORTED 9 - 20    Calcium 8.7 8.6 - 10.4 mg/dL    Sodium 136 135 - 144 mmol/L    Potassium 3.6 (L) 3.7 - 5.3 mmol/L    Chloride 104 98 - 107 mmol/L    CO2 24 20 - 31 mmol/L    Anion Gap 8 (L) 9 - 17 mmol/L    GFR Non-African American >60 >60 mL/min    GFR African American >60 >60 mL/min    GFR Comment          GFR Staging NOT REPORTED    Brain Natriuretic Peptide   Result Value Ref Range    Pro-BNP 80 <300 pg/mL    BNP Interpretation Pro-BNP Reference Range:    CBC Auto Differential   Result Value Ref Range    WBC 4.4 3.5 - 11.3 k/uL    RBC 3.73 (L) 3.95 - 5.11 m/uL    Hemoglobin 10.7 (L) 11.9 - 15.1 g/dL    Hematocrit 34.4 (L) 36.3 - 47.1 %    MCV 92.2 82.6 - 102.9 fL    MCH 28.7 25.2 - 33.5 pg Chest Portable    Result Date: 7/22/2020  EXAMINATION: ONE XRAY VIEW OF THE CHEST 7/22/2020 8:19 am COMPARISON: None. HISTORY: ORDERING SYSTEM PROVIDED HISTORY: chest pain TECHNOLOGIST PROVIDED HISTORY: chest pain Reason for Exam: Chest pain/ AP erect/  gp used Acuity: Unknown Type of Exam: Unknown FINDINGS: Shallow inspiration. Indistinct left costophrenic sulcus possibly from poor penetration, effusion, or atelectasis. No consolidation or pneumothorax. The heart is enlarged but magnified by technique. Shallow inspiratory radiograph with somewhat obscured left costophrenic sulcus possibly from trace effusion or atelectasis. No consolidative opacities. Physical Exam:  At time of transfer  General appearance - NAD, AOx 3    Lungs -CTAB, no R/R/R  Heart - RRR, no M/R/G  Abdomen - Soft, NT/ND  Neurological:  MAEx4, No focal motor deficit, sensory loss  Extremities - Cap refil <2 sec in all ext., no edema  Skin -warm, dry      Hospital Course:  Clinical course has improved, labs and imaging reviewed. Efrain Jeter originally presented to the hospital on 7/22/2020  7:09 AM with chest pain. At that time it was determined that She required further observation and cardiology evaluation. On evaluation of the patient with her EKG was evident the patient had Wellen syndrome. Patient was transferred to medicine service and discussed immediately with cardiology. COVID testing was ordered. Patient was sent to cardiac Cath Lab for medicine team to admit afterwards. . Labs and imaging were followed daily. Imaging results as above.        Disposition: Transfer    Condition: Good    Time Spent: 0 day        OBS/CDU   RESIDENT NOTE FOR INPATIENT STATUS      INPATIENT       The Patient Now Meets Inpatient Criteria as of July 22, 2020.  7:09 AM.    For the Following reasons:    [x]   Severity of Signs/ Symptoms indicate admission need   [x]   Medical Condition Would be Threatened in lower level of care   [x]

## 2020-07-22 NOTE — ED NOTES
Per Attending request, writer met with patient regarding underage son & aged relative in home causing patient to be unsure about admission. Per patient request, writer contacted patient's sister Nayana Kennedy [phone]) to request assistance with patient's son. Adithya Maxwell stated she will  son & make sure patient's uncle, Aziza Oseguera for whom patient's the guardian, is checked on & taken care of. Writer relayed to patient. Patient stated Moni Stahl has [facility]  Mee Fields [phone] who's working on getting respite arranged for Moni Dus. Writer spoke with Mee Fields who stated patient chose [facility] ([facility]) but they have no beds. Mee Fields suggested writer contact [facility]'s sister facility, [facility]. Writer spoke with Sheeba Oliver of [facility] who confirmed they take Marylou-Myke & have immediate availability for respite/short term care. Writer informed Mee Fields who stated she's working with [facility]  to get approval for immediate transfer. Writer spoke with patient's sister Adithya Maxwell who stated patient's daughter Lopez Michel stated she'll stay with Moni Dus vs going to respite. Writer informed patient who stated preference for respite care vs daughter's care. Writer informed Adithya Maxwell, transferred [employer] to patient's room for coordination.       YOGI Hope, Fuad Villela  07/22/20 8503

## 2020-07-22 NOTE — ED NOTES
Pt to ED from home for c/o sudden onset CP that woke her from her sleep. Pt reports left sided chest pain radiating down left arm. Pt denies n/v/d, denies SOB, denies dizziness or diaphoresis. Pt tearful during triage. Pt rates pain 7/10. Pt denies hx of cardiac disease, does endorse HTN and takes prescribed medications for this. Pt denies respiratory symptoms  Pt on monitor, EKG completed. Bed locked in lowest position, side rail up X1, call light provided.        Oscar Soto RN  07/22/20 1925

## 2020-07-22 NOTE — H&P
[address] [facility]     Department of Internal Medicine - Staff Internal Medicine Teaching Service          ADMISSION NOTE/HISTORY AND PHYSICAL EXAMINATION   Date: 7/22/2020  Patient Name: Rayna Jimenez  Date of admission: 7/22/2020  7:09 AM  YOB: 1980  PCP: No primary care provider on file. History Obtained From:  patient    CHIEF COMPLAINT     Chief complaint: Left chest pain    HISTORY OF PRESENTING ILLNESS     The patient is a pleasant 38 y.o. female presents with a chief complaint of sudden onset left chest pain, awakening her from sleep, 10 out of 10 radiating to her left arm associated with nausea and diaphoresis due to which she was hyperventilating. She denied fever, leg swelling, headache, palpitations, abdominal pain, or diarrhea. She denied past history of stroke, MI or seizures. In the ER, she was in severe pain with some left arm tenderness. Her vitals were stable, with blood pressure of 149/87 as she missed her today's antihypertensive dose. Her EKG showed T wave inversions in V2 to V4, troponin 18. She was given aspirin, Toradol and started on heparin. Cardiology was consulted from ER> recommended to do left heart cath with concern of Wellen syndrome. Her past history significant for:  Hypertension> on lisinopril 10 mg and amlodipine 5 mg    Previous history of preeclampsia during her first pregnancy in 1996> remained on antihypertensive for a while but then stopped due to being normotensive. Last month, admitted for some undetermined psychosis> denied to take antipsychotic medications at that time. Review of Systems   Constitutional: Positive for fever. HENT: Negative for hearing loss. Eyes: Negative for visual disturbance. Respiratory: Negative for cough and shortness of breath. Cardiovascular: Positive for chest pain. Negative for palpitations. Gastrointestinal: Negative for abdominal pain.    Genitourinary: Negative for dysuria. Musculoskeletal: Negative for back pain. Neurological: Negative for dizziness. Psychiatric/Behavioral: Negative for confusion. PAST MEDICAL HISTORY     Past Medical History:   Diagnosis Date    Anxiety     Hypertension        PAST SURGICAL HISTORY     Past Surgical History:   Procedure Laterality Date    ACHILLES TENDON SURGERY      CARPAL TUNNEL RELEASE       SECTION         ALLERGIES     Patient has no known allergies. MEDICATIONS PRIOR TO ADMISSION     Prior to Admission medications    Medication Sig Start Date End Date Taking? Authorizing Provider   cyclobenzaprine (FLEXERIL) 5 MG tablet Take 5 mg by mouth 3 times daily as needed for Muscle spasms   Yes Historical Provider, MD   lisinopril (PRINIVIL;ZESTRIL) 10 MG tablet Take 1 tablet by mouth daily 20  Yes Easton Goodwin APRN - CNP   amLODIPine (NORVASC) 5 MG tablet Take 1 tablet by mouth daily 20  Yes CARISSA Matias CNP       SOCIAL HISTORY     Tobacco: Current smoker, 1/3rd pack per day  Alcohol: None currently  Illicits: Uses marijuana tablets to relax    FAMILY HISTORY     History reviewed. No pertinent family history. PHYSICAL EXAM     Vitals: BP (!) 143/78   Pulse 53   Temp 98.6 °F (37 °C) (Oral)   Resp 16   Ht 5' 4\" (1.626 m)   Wt 160 lb (72.6 kg)   LMP  (Within Months)   SpO2 100%   BMI 27.46 kg/m²   Tmax: Temp (24hrs), Av.6 °F (37 °C), Min:98.6 °F (37 °C), Max:98.6 °F (37 °C)    Last Body weight:   Wt Readings from Last 3 Encounters:   20 160 lb (72.6 kg)   20 160 lb (72.6 kg)     Body Mass Index : Body mass index is 27.46 kg/m². PHYSICAL EXAMINATION:  Constitutional: This is a well developed, well nourished, 25-29.9 - Overweight 38y.o. year old female who is alert, oriented, cooperative and in no apparent distress. Head:normocephalic and atraumatic. EENT:  PERRLA. No conjunctival injections.    Septum was midline, mucosa was without erythema, exudates or cobblestoning. No thrush was noted. Neck: Supple without thyromegaly. No elevated JVP. Trachea was midline. Respiratory: Chest was symmetrical without dullness to percussion. Breath sounds bilaterally were clear to auscultation. There were no wheezes, rhonchi or rales. There is no intercostal retraction or use of accessory muscles. No egophony noted. Cardiovascular: Regular without murmur, clicks, gallops or rubs. Abdomen: Slightly rounded and soft without organomegaly. No rebound, rigidity or guarding was appreciated. Lymphatic: No lymphadenopathy. Musculoskeletal: Normal curvature of the spine. No gross muscle weakness. Extremities:  No lower extremity edema, ulcerations, tenderness, varicosities or erythema. Muscle size, tone and strength are normal.  No involuntary movements are noted. Skin:  Warm and dry. Good color, turgor and pigmentation. No lesions or scars.   No cyanosis or clubbing  Neurological/Psychiatric: The patient's general behavior, level of consciousness, thought content and emotional status is normal.          INVESTIGATIONS     Laboratory Testing:     Recent Results (from the past 24 hour(s))   Basic Metabolic Panel    Collection Time: 07/22/20  8:32 AM   Result Value Ref Range    Glucose 121 (H) 70 - 99 mg/dL    BUN 9 6 - 20 mg/dL    CREATININE 0.53 0.50 - 0.90 mg/dL    Bun/Cre Ratio NOT REPORTED 9 - 20    Calcium 8.7 8.6 - 10.4 mg/dL    Sodium 136 135 - 144 mmol/L    Potassium 3.6 (L) 3.7 - 5.3 mmol/L    Chloride 104 98 - 107 mmol/L    CO2 24 20 - 31 mmol/L    Anion Gap 8 (L) 9 - 17 mmol/L    GFR Non-African American >60 >60 mL/min    GFR African American >60 >60 mL/min    GFR Comment          GFR Staging NOT REPORTED    Brain Natriuretic Peptide    Collection Time: 07/22/20  8:32 AM   Result Value Ref Range    Pro-BNP 80 <300 pg/mL    BNP Interpretation Pro-BNP Reference Range:    CBC Auto Differential    Collection Time: 07/22/20  8:32 AM   Result Value Ref Range    WBC 4.4 3.5 - 11.3 k/uL    RBC 3.73 (L) 3.95 - 5.11 m/uL    Hemoglobin 10.7 (L) 11.9 - 15.1 g/dL    Hematocrit 34.4 (L) 36.3 - 47.1 %    MCV 92.2 82.6 - 102.9 fL    MCH 28.7 25.2 - 33.5 pg    MCHC 31.1 28.4 - 34.8 g/dL    RDW 15.1 (H) 11.8 - 14.4 %    Platelets 836 583 - 712 k/uL    MPV 10.9 8.1 - 13.5 fL    NRBC Automated 0.0 0.0 per 100 WBC    Differential Type NOT REPORTED     Seg Neutrophils 65 36 - 65 %    Lymphocytes 23 (L) 24 - 43 %    Monocytes 6 3 - 12 %    Eosinophils % 5 (H) 1 - 4 %    Basophils 1 0 - 2 %    Immature Granulocytes 0 0 %    Segs Absolute 2.88 1.50 - 8.10 k/uL    Absolute Lymph # 1.03 (L) 1.10 - 3.70 k/uL    Absolute Mono # 0.28 0.10 - 1.20 k/uL    Absolute Eos # 0.21 0.00 - 0.44 k/uL    Basophils Absolute <0.03 0.00 - 0.20 k/uL    Absolute Immature Granulocyte <0.03 0.00 - 0.30 k/uL    WBC Morphology NOT REPORTED     RBC Morphology ANISOCYTOSIS PRESENT     Platelet Estimate NOT REPORTED    Troponin    Collection Time: 07/22/20  8:32 AM   Result Value Ref Range    Troponin, High Sensitivity 14 0 - 14 ng/L    Troponin T NOT REPORTED <0.03 ng/mL    Troponin Interp NOT REPORTED    D-Dimer, Quantitative    Collection Time: 07/22/20  8:32 AM   Result Value Ref Range    D-Dimer, Quant 0.45 mg/L FEU   APTT    Collection Time: 07/22/20  8:32 AM   Result Value Ref Range    PTT 26.3 20.5 - 30.5 sec   Troponin    Collection Time: 07/22/20  9:54 AM   Result Value Ref Range    Troponin, High Sensitivity 18 (H) 0 - 14 ng/L    Troponin T NOT REPORTED <0.03 ng/mL    Troponin Interp NOT REPORTED    COVID-19    Collection Time: 07/22/20 12:51 PM    Specimen: Other   Result Value Ref Range    SARS-CoV-2          SARS-CoV-2, Rapid Not Detected Not Detected    Source . NASOPHARYNGEAL SWAB     SARS-CoV-2, PCR             Imaging:   Xr Chest Portable    Result Date: 7/22/2020  Shallow inspiratory radiograph with somewhat obscured left costophrenic sulcus possibly from trace effusion or atelectasis.  No consolidative opacities. ASSESSMENT & PLAN     ASSESSMENT / PLAN:     IMPRESSION  This is a 38 y.o. female who presented with left-sided chest pain with ischemic changes on EKG with STEMI equivalent concerning for Wellens syndrome. STEMI  -Left-sided chest pain with left arm radiation  -EKG show deep T wave inversions in V2 to V4  -Troponin 18  -Left heart cath> 90% proximal and 70% mid LAD stenosis> 2 stents placed  -Start aspirin 81 mg daily, Lipitor 40 mg at bedtime, Brilinta 90 mg twice daily, metoprolol 12.5 mg twice daily    Essential hypertension  -Controlled  -Resume lisinopril 10 mg and Norvasc 5 mg    Normocytic anemia  -Unknown cause> we will monitor        DVT ppx: Heaprin  GI ppx: None    PT/OT/SW:  Discharge Planning:    Wanda Farr MD  Internal Medicine Resident, PGY-1  [facility]; [place], [place]  7/22/2020, 2:05 PM  Attending Physician Statement  I have discussed the care of Masood 43 and I have examined the patient myselft and taken ros and hpi , including pertinent history and exam findings,  with the resident. I have reviewed the key elements of all parts of the encounter with the resident. I agree with the assessment, plan and orders as documented by the resident.   Late note entry      Electronically signed by Dustin Alvarez MD

## 2020-07-22 NOTE — PROGRESS NOTES
Arterial sheath pulled per RN  Pressure held for 25 minutes. Hemostasis obtained. Safeguard applied with 45 ml of air in baloon. Patient tolerated well.

## 2020-07-22 NOTE — PROGRESS NOTES
Angiomax stopped RN explained to the patient line will be pulled 2 hours after the angiomax is stopped  Patient voiced understanding.

## 2020-07-22 NOTE — CONSULTS
Intravenous, Once  heparin (porcine) injection 4,000 Units, 4,000 Units, Intravenous, PRN  heparin (porcine) injection 2,000 Units, 2,000 Units, Intravenous, PRN  heparin 25,000 units in dextrose 5% 250 mL infusion, 12 Units/kg/hr, Intravenous, Continuous    Allergies:  Patient has no known allergies. Social History:   reports that she has been smoking. She has been smoking about 0.50 packs per day. She has never used smokeless tobacco. She reports previous alcohol use. She reports current drug use. Drug: Marijuana. Family History: family history is not on file. No h/o sudden cardiac death. No for premature CAD    REVIEW OF SYSTEMS:    · Constitutional: there has been no unanticipated weight loss. There's been No change in energy level, No change in activity level. · Eyes: No visual changes or diplopia. No scleral icterus. · ENT: No Headaches  · Cardiovascular: Chest pain   · Respiratory: No previous pulmonary problems, No cough  · Gastrointestinal: No abdominal pain. No change in bowel or bladder habits. · Genitourinary: No dysuria, trouble voiding, or hematuria. · Musculoskeletal:  No gait disturbance, No weakness or joint complaints. · Integumentary: No rash or pruritis. · Neurological: No headache, diplopia, change in muscle strength, numbness or tingling. No change in gait, balance, coordination, mood, affect, memory, mentation, behavior. · Psychiatric: No anxiety, or depression. · Endocrine: No temperature intolerance. No excessive thirst, fluid intake, or urination. No tremor. · Hematologic/Lymphatic: No abnormal bruising or bleeding, blood clots or swollen lymph nodes. · Allergic/Immunologic: No nasal congestion or hives.       PHYSICAL EXAM:      BP (!) 152/100   Pulse 78   Temp 98.6 °F (37 °C) (Oral)   Resp 20   Ht 5' 4\" (1.626 m)   Wt 160 lb (72.6 kg)   LMP  (Within Months)   SpO2 96%   BMI 27.46 kg/m²    Constitutional and General Appearance: alert, cooperative, no distress and appears stated age  [de-identified]: PERRL, no cervical lymphadenopathy. No masses palpable. Normal oral mucosa  Respiratory:  · Normal excursion and expansion without use of accessory muscles  · Resp Auscultation: Good respiratory effort. No for increased work of breathing. On auscultation: clear to auscultation bilaterally  Cardiovascular:  · The apical impulse is not displaced  · Heart tones are crisp and normal. regular S1 and S2.  · Jugular venous pulsation Normal  · The carotid upstroke is normal in amplitude and contour without delay or bruit  · Peripheral pulses are symmetrical and full   Abdomen:   · No masses or tenderness  · Bowel sounds present  Extremities:  ·  No Cyanosis or Clubbing  ·  Lower extremity edema: No  ·  Skin: Warm and dry  Neurological:  · Alert and oriented. · Moves all extremities well  · No abnormalities of mood, affect, memory, mentation, or behavior are noted    DATA:    Diagnostics:    EKG:T wave inversion in V1-V3    Labs:     CBC:   Recent Labs     07/22/20  0832   WBC 4.4   HGB 10.7*   HCT 34.4*        BMP:   Recent Labs     07/22/20  0832      K 3.6*   CO2 24   BUN 9   CREATININE 0.53   LABGLOM >60   GLUCOSE 121*     BNP: No results for input(s): BNP in the last 72 hours. PT/INR: No results for input(s): PROTIME, INR in the last 72 hours. APTT:No results for input(s): APTT in the last 72 hours. CARDIAC ENZYMES:No results for input(s): CKTOTAL, CKMB, CKMBINDEX, TROPONINI in the last 72 hours. FASTING LIPID PANEL:  Lab Results   Component Value Date    HDL 41 06/14/2020    TRIG 30 06/14/2020     LIVER PROFILE:No results for input(s): AST, ALT, LABALBU in the last 72 hours. IMPRESSION:    Patient Active Problem List   Diagnosis    Psychosis ([address]    Hypokalemia    Iron deficiency anemia secondary to inadequate dietary iron intake    Paranoid delusion ([facility]    Essential hypertension    Chest pain     1. Chest pain typical EKG concerning for wellens syndrome.    2.

## 2020-07-22 NOTE — PROGRESS NOTES
Patient admitted, consent signed and questions answered. Patient ready for procedure. Call light to reach with side rails up 2 of 2. groins clipped. History and physical updated.

## 2020-07-22 NOTE — PROGRESS NOTES
Patient transferred from cath lab per bed to room 1012 with KRISTA Tran and KRISTA Dickens at bedside  Patient attached to monitor vital signs obtained Art Line transduced. Right groin site clean dry and intact. Patient denies any pain at this time. RN explained to the patient the importance of keeping her head down and not lifting off the pillow. Patient voiced understanding.

## 2020-07-23 VITALS
OXYGEN SATURATION: 100 % | RESPIRATION RATE: 18 BRPM | HEART RATE: 78 BPM | BODY MASS INDEX: 27.31 KG/M2 | WEIGHT: 160 LBS | DIASTOLIC BLOOD PRESSURE: 76 MMHG | TEMPERATURE: 98.3 F | SYSTOLIC BLOOD PRESSURE: 131 MMHG | HEIGHT: 64 IN

## 2020-07-23 LAB
ABSOLUTE EOS #: 0.22 K/UL (ref 0–0.44)
ABSOLUTE IMMATURE GRANULOCYTE: <0.03 K/UL (ref 0–0.3)
ABSOLUTE LYMPH #: 1.19 K/UL (ref 1.1–3.7)
ABSOLUTE MONO #: 0.47 K/UL (ref 0.1–1.2)
ANION GAP SERPL CALCULATED.3IONS-SCNC: 16 MMOL/L (ref 9–17)
BASOPHILS # BLD: 0 % (ref 0–2)
BASOPHILS ABSOLUTE: <0.03 K/UL (ref 0–0.2)
BUN BLDV-MCNC: 7 MG/DL (ref 6–20)
BUN/CREAT BLD: NORMAL (ref 9–20)
CALCIUM SERPL-MCNC: 8.9 MG/DL (ref 8.6–10.4)
CHLORIDE BLD-SCNC: 105 MMOL/L (ref 98–107)
CO2: 20 MMOL/L (ref 20–31)
CREAT SERPL-MCNC: 0.56 MG/DL (ref 0.5–0.9)
DIFFERENTIAL TYPE: ABNORMAL
EOSINOPHILS RELATIVE PERCENT: 3 % (ref 1–4)
FOLATE: 14.4 NG/ML
GFR AFRICAN AMERICAN: >60 ML/MIN
GFR NON-AFRICAN AMERICAN: >60 ML/MIN
GFR SERPL CREATININE-BSD FRML MDRD: NORMAL ML/MIN/{1.73_M2}
GFR SERPL CREATININE-BSD FRML MDRD: NORMAL ML/MIN/{1.73_M2}
GLUCOSE BLD-MCNC: 98 MG/DL (ref 70–99)
HCT VFR BLD CALC: 40 % (ref 36.3–47.1)
HEMOGLOBIN: 12.4 G/DL (ref 11.9–15.1)
IMMATURE GRANULOCYTES: 0 %
LV EF: 55 %
LVEF MODALITY: NORMAL
LYMPHOCYTES # BLD: 17 % (ref 24–43)
MCH RBC QN AUTO: 28.3 PG (ref 25.2–33.5)
MCHC RBC AUTO-ENTMCNC: 31 G/DL (ref 28.4–34.8)
MCV RBC AUTO: 91.3 FL (ref 82.6–102.9)
MONOCYTES # BLD: 7 % (ref 3–12)
NRBC AUTOMATED: 0 PER 100 WBC
PDW BLD-RTO: 15 % (ref 11.8–14.4)
PLATELET # BLD: 254 K/UL (ref 138–453)
PLATELET ESTIMATE: ABNORMAL
PMV BLD AUTO: 10.9 FL (ref 8.1–13.5)
POTASSIUM SERPL-SCNC: 4.1 MMOL/L (ref 3.7–5.3)
RBC # BLD: 4.38 M/UL (ref 3.95–5.11)
RBC # BLD: ABNORMAL 10*6/UL
SEG NEUTROPHILS: 72 % (ref 36–65)
SEGMENTED NEUTROPHILS ABSOLUTE COUNT: 4.97 K/UL (ref 1.5–8.1)
SODIUM BLD-SCNC: 141 MMOL/L (ref 135–144)
TROPONIN INTERP: ABNORMAL
TROPONIN INTERP: ABNORMAL
TROPONIN T: ABNORMAL NG/ML
TROPONIN T: ABNORMAL NG/ML
TROPONIN, HIGH SENSITIVITY: 25 NG/L (ref 0–14)
TROPONIN, HIGH SENSITIVITY: 27 NG/L (ref 0–14)
VITAMIN B-12: 354 PG/ML (ref 232–1245)
WBC # BLD: 6.9 K/UL (ref 3.5–11.3)
WBC # BLD: ABNORMAL 10*3/UL

## 2020-07-23 PROCEDURE — 2580000003 HC RX 258: Performed by: STUDENT IN AN ORGANIZED HEALTH CARE EDUCATION/TRAINING PROGRAM

## 2020-07-23 PROCEDURE — 6370000000 HC RX 637 (ALT 250 FOR IP): Performed by: STUDENT IN AN ORGANIZED HEALTH CARE EDUCATION/TRAINING PROGRAM

## 2020-07-23 PROCEDURE — 80048 BASIC METABOLIC PNL TOTAL CA: CPT

## 2020-07-23 PROCEDURE — 99239 HOSP IP/OBS DSCHRG MGMT >30: CPT | Performed by: INTERNAL MEDICINE

## 2020-07-23 PROCEDURE — 82746 ASSAY OF FOLIC ACID SERUM: CPT

## 2020-07-23 PROCEDURE — 6360000002 HC RX W HCPCS: Performed by: STUDENT IN AN ORGANIZED HEALTH CARE EDUCATION/TRAINING PROGRAM

## 2020-07-23 PROCEDURE — 93306 TTE W/DOPPLER COMPLETE: CPT

## 2020-07-23 PROCEDURE — 85025 COMPLETE CBC W/AUTO DIFF WBC: CPT

## 2020-07-23 PROCEDURE — 36415 COLL VENOUS BLD VENIPUNCTURE: CPT

## 2020-07-23 PROCEDURE — 82607 VITAMIN B-12: CPT

## 2020-07-23 PROCEDURE — 84484 ASSAY OF TROPONIN QUANT: CPT

## 2020-07-23 PROCEDURE — 86038 ANTINUCLEAR ANTIBODIES: CPT

## 2020-07-23 RX ORDER — ATORVASTATIN CALCIUM 40 MG/1
40 TABLET, FILM COATED ORAL NIGHTLY
Qty: 30 TABLET | Refills: 3 | Status: SHIPPED | OUTPATIENT
Start: 2020-07-23

## 2020-07-23 RX ORDER — ASPIRIN 81 MG/1
81 TABLET ORAL DAILY
Qty: 30 TABLET | Refills: 3 | Status: SHIPPED | OUTPATIENT
Start: 2020-07-24

## 2020-07-23 RX ORDER — NITROGLYCERIN 0.4 MG/1
0.4 TABLET SUBLINGUAL EVERY 5 MIN PRN
Qty: 25 TABLET | Refills: 3 | Status: SHIPPED | OUTPATIENT
Start: 2020-07-23

## 2020-07-23 RX ADMIN — LISINOPRIL 10 MG: 10 TABLET ORAL at 08:16

## 2020-07-23 RX ADMIN — HEPARIN SODIUM 5000 UNITS: 5000 INJECTION INTRAVENOUS; SUBCUTANEOUS at 14:15

## 2020-07-23 RX ADMIN — Medication 10 ML: at 08:31

## 2020-07-23 RX ADMIN — TICAGRELOR 90 MG: 90 TABLET ORAL at 08:15

## 2020-07-23 RX ADMIN — Medication 81 MG: at 08:16

## 2020-07-23 RX ADMIN — AMLODIPINE BESYLATE 5 MG: 5 TABLET ORAL at 08:16

## 2020-07-23 RX ADMIN — METOPROLOL TARTRATE 12.5 MG: 25 TABLET ORAL at 08:16

## 2020-07-23 ASSESSMENT — PAIN SCALES - GENERAL
PAINLEVEL_OUTOF10: 0

## 2020-07-23 NOTE — PROGRESS NOTES
[facility]  Internal Medicine Teaching Residency Program  Inpatient Daily Progress Note  ______________________________________________________________________________    Patient: Modesta Harrison  YOB: 1980   DTI:[id number]    Acct: [de-identified]     Room: [address] [place] [address]  Admit date: 7/22/2020  Today's date: 07/23/20  Number of days in the hospital: 1    SUBJECTIVE   Admitting Diagnosis: Chest pain  CC: Left chest pain  Pt examined at bedside. Chart & results reviewed. Had left heart cath yesterday with two stents placed in LAD. Feels fine today, denies any chest pain, able to eat and drink, up and about as well. Vitals:stable    U tox positive for opiates, marijuana, benzodiazepines. ROS:  Constitutional:  negative for chills, fevers, sweats  Respiratory:  negative for cough, dyspnea on exertion, hemoptysis, shortness of breath, wheezing  Cardiovascular:  negative for chest pain, chest pressure/discomfort, lower extremity edema, palpitations  Gastrointestinal:  negative for abdominal pain, constipation, diarrhea, nausea, vomiting  Neurological:  negative for dizziness, headache  BRIEF HISTORY     The patient is a pleasant 38 y.o. female presents with a chief complaint of sudden onset left chest pain, awakening her from sleep, 10 out of 10 radiating to her left arm associated with nausea and diaphoresis due to which she was hyperventilating. She denied fever, leg swelling, headache, palpitations, abdominal pain, or diarrhea. She denied past history of stroke, MI or seizures.     In the ER, she was in severe pain with some left arm tenderness. Her vitals were stable, with blood pressure of 149/87 as she missed her today's antihypertensive dose. Her EKG showed T wave inversions in V2 to V4, troponin 18. She was given aspirin, Toradol and started on heparin.   Cardiology was consulted from ER> recommended to do left heart cath with concern of Rock syndrome.     Her past history significant for:  Hypertension> on lisinopril 10 mg and amlodipine 5 mg     Previous history of preeclampsia during her first pregnancy in > remained on antihypertensive for a while but then stopped due to being normotensive.     Last month, admitted for some undetermined psychosis> denied to take antipsychotic medications at that time. OBJECTIVE     Vital Signs:  BP (!) 151/80   Pulse 71   Temp 98.2 °F (36.8 °C) (Oral)   Resp 20   Ht 5' 4\" (1.626 m)   Wt 160 lb (72.6 kg)   LMP  (Within Months)   SpO2 100%   BMI 27.46 kg/m²     Temp (24hrs), Av.4 °F (36.9 °C), Min:98.2 °F (36.8 °C), Max:98.6 °F (37 °C)    In: 10   Out: -     Physical Exam:  Constitutional: This is a well developed, well nourished, 25-29.9 - Overweight 38y.o. year old female who is alert, oriented, cooperative and in no apparent distress. Head:normocephalic and atraumatic. EENT:  PERRLA. No conjunctival injections. Septum was midline, mucosa was without erythema, exudates or cobblestoning. No thrush was noted. Neck: Supple without thyromegaly. No elevated JVP. Trachea was midline. Respiratory: Chest was symmetrical without dullness to percussion. Breath sounds bilaterally were clear to auscultation. There were no wheezes, rhonchi or rales. There is no intercostal retraction or use of accessory muscles. No egophony noted. Cardiovascular: Regular without murmur, clicks, gallops or rubs. Abdomen: Slightly rounded and soft without organomegaly. No rebound, rigidity or guarding was appreciated. Lymphatic: No lymphadenopathy. Musculoskeletal: Normal curvature of the spine. No gross muscle weakness. Extremities:  No lower extremity edema, ulcerations, tenderness, varicosities or erythema. Muscle size, tone and strength are normal.  No involuntary movements are noted. Skin:  Warm and dry. Good color, turgor and pigmentation. No lesions or scars.   No cyanosis or Results   Component Value Date    CHOL 129 07/22/2020    HDL 45 07/22/2020    TRIG 68 07/22/2020     LIVER PROFILE: No results for input(s): AST, ALT, ALB, BILIDIR, BILITOT, ALKPHOS in the last 72 hours. MICROBIOLOGY: No results found for: CULTURE    Imaging:    Xr Chest Portable    Result Date: 7/22/2020  Shallow inspiratory radiograph with somewhat obscured left costophrenic sulcus possibly from trace effusion or atelectasis. No consolidative opacities. ASSESSMENT & PLAN     ASSESSMENT / PLAN:      IMPRESSION  This is a 38 y.o. female who presented with left-sided chest pain with ischemic changes on EKG with STEMI equivalent concerning for Wellens syndrome.        STEMI  -Left-sided chest pain with left arm radiation  -Wellen syndrome>EKG show deep T wave inversions in V2 to V4  -Troponin 18>36  -Left heart cath> 90% proximal and 70% mid LAD stenosis> 2 stents placed  -Start aspirin 81 mg daily, Lipitor 40 mg at bedtime, Brilinta 90 mg twice daily, metoprolol 12.5 mg twice daily  -cardiology following     Essential hypertension  -Controlled  -Resume lisinopril 10 mg and Norvasc 5 mg     Normocytic anemia  -Unknown cause> will monitor     Urine toxicology  -positive for opiates, marijuana, benzodiazepines.     DVT ppx: Heparin  GI ppx: None     PT/OT/SW: ongoing  Discharge Planning: ongoing    Akash Webster MD  Internal Medicine Resident, PGY-1  [address], [place]  7/23/2020, 12:33 AM  Attending Physician Statement  I have discussed the care of Masood Otto and I have examined the patient myselft and taken ros and hpi , including pertinent history and exam findings,  with the resident. I have reviewed the key elements of all parts of the encounter with the resident. I agree with the assessment, plan and orders as documented by the resident.   Post cath and stent no pain  Ok to dc  Talked to dr Lorri Marshall cardio  Plan discussed      Electronically signed by Denisse Sylvester Natalee Mullen MD

## 2020-07-23 NOTE — PLAN OF CARE
Problem: Infection:  Goal: Will remain free from infection  Description: Will remain free from infection  Outcome: Met This Shift     Problem: Safety:  Goal: Free from accidental physical injury  Description: Free from accidental physical injury  Outcome: Met This Shift  Goal: Free from intentional harm  Description: Free from intentional harm  Outcome: Met This Shift     Problem: Daily Care:  Goal: Daily care needs are met  Description: Daily care needs are met  Outcome: Met This Shift     Problem: Pain:  Goal: Patient's pain/discomfort is manageable  Description: Patient's pain/discomfort is manageable  Outcome: Met This Shift     Problem: Skin Integrity:  Goal: Skin integrity will stabilize  Description: Skin integrity will stabilize  Outcome: Met This Shift     Problem: Discharge Planning:  Goal: Patients continuum of care needs are met  Description: Patients continuum of care needs are met  Outcome: Met This Shift

## 2020-07-23 NOTE — PROGRESS NOTES
Physical Therapy  DATE: 2020    NAME: Stephanie Mooney  MRN: [id number]   : 1980    Patient not seen this date for Physical Therapy due to:  [] Blood transfusion in progress  [] Hemodialysis  []  Patient Declined  [] Spine Precautions   [] Strict Bedrest  [] Surgery/ Procedure  [] Testing      [] Other        [x] PT being discontinued at this time. Patient independent. No further needs. Pt demonstrates ability to ambulate 300ft independently without device. Pt reports she has been up and ambulating around her room without difficulty. Pt denies any mobility concerns. Pt without any acute care mobility needs- will defer PT evaluation at this time. [] PT being discontinued at this time as the patient has been transferred to palliative care. No further needs.     Rob Perez, PT

## 2020-07-23 NOTE — PROGRESS NOTES
CLINICAL PHARMACY NOTE: MEDS TO [address] Select Patient?: No  Total # of Prescriptions Filled: 5   The following medications were delivered to the patient:  · Aspirin  · Metoprolol tartrate  · brilinta  · Nitro  · atorvastatin  Total # of Interventions Completed: 0  Time Spent (min): 5    Additional Documentation: meds delivered to patient 7/23 at 3:25pm

## 2020-07-23 NOTE — DISCHARGE INSTR - COC
Continuity of Care Form    Patient Name: Bruno Hicks   :  1980  MRN:  [id number]    Admit date:  2020  Discharge date:  ***    Code Status Order: Full Code   Advance Directives:     Admitting Physician:  Mary Hastings MD  PCP: No primary care provider on file. Discharging Nurse: [facility] Unit/Room#:   Discharging Unit Phone Number: ***    Emergency Contact:   Extended Emergency Contact Information  Primary Emergency Contact: Sapna Bustamante, [address] Phone: [phone]  Relation: Brother/Sister  Secondary Emergency Contact: [address] [place] [address] Phone: [phone]  Relation: Other    Past Surgical History:  Past Surgical History:   Procedure Laterality Date    ACHILLES TENDON SURGERY      CARPAL TUNNEL RELEASE       SECTION         Immunization History: There is no immunization history on file for this patient.     Active Problems:  Patient Active Problem List   Diagnosis Code    Psychosis ([address] F29    Hypokalemia E87.6    Iron deficiency anemia secondary to inadequate dietary iron intake D50.8    Paranoid delusion ([address] F22    Essential hypertension I10    Chest pain R07.9    STEMI involving left anterior descending coronary artery ([address] I21.02       Isolation/Infection:   Isolation          No Isolation        Patient Infection Status     Infection Onset Added Last Indicated Last Indicated By Review Planned Expiration Resolved Resolved By    None active    Resolved    COVID-19 Rule Out 20 COVID-19 (Ordered)   20 Rule-Out Test Resulted          Nurse Assessment:  Last Vital Signs: /76   Pulse 78   Temp 98.3 °F (36.8 °C) (Oral)   Resp 18   Ht 5' 4\" (1.626 m)   Wt 160 lb (72.6 kg)   LMP  (Within Months)   SpO2 100%   BMI 27.46 kg/m²     Last documented pain score (0-10 scale): Pain Level: 0  Last Weight:   Wt Readings from Last 1 Encounters:   20 160 lb (72.6 kg)     Mental Status:  {IP PT MENTAL STATUS:}    IV Access:  { SURYA IV ACCESS:881169443}    Nursing Mobility/ADLs:  Walking   {P DME VPG}  Transfer  {P DME LZEA:105242919}  Bathing  {CHP DME RWSA:672878510}  Dressing  {CHP DME MKNV:173812456}  Toileting  {CHP DME VPVI:119673986}  Feeding  {[facility] DME BPIA:097101706}  Med Admin  {P DME AKDF:192203274}  Med Delivery   { SURYA MED Delivery:447153529}    Wound Care Documentation and Therapy:        Elimination:  Continence:   · Bowel: {YES / TH:30450}  · Bladder: {YES / XL:94601}  Urinary Catheter: {Urinary Catheter:094364018}   Colostomy/Ileostomy/Ileal Conduit: {YES / RAYO:63002}       Date of Last BM: ***    Intake/Output Summary (Last 24 hours) at 2020 1557  Last data filed at 2020 1245  Gross per 24 hour   Intake 935 ml   Output 3425 ml   Net -2490 ml     I/O last 3 completed shifts:   In: 550 [P.O.:360; I.V.:575]  Out: 8737 [Urine:3425]    Safety Concerns:     [address] Safety Concerns:798425415}    Impairments/Disabilities:      [address] Impairments/Disabilities:637310776}    Nutrition Therapy:  Current Nutrition Therapy:   [address] Diet List:615923214}    Routes of Feeding: {[facility] DME Other Feedings:141529798}  Liquids: {Slp liquid thickness:15429}  Daily Fluid Restriction: {CHP DME Yes amt example:423285724}  Last Modified Barium Swallow with Video (Video Swallowing Test): {Done Not Done DRPH:791680072}    Treatments at the Time of Hospital Discharge:   Respiratory Treatments: ***  Oxygen Therapy:  {Therapy; copd oxygen:49026}  Ventilator:    { MOISES Vent IMHW:339182674}    Rehab Therapies: {THERAPEUTIC INTERVENTION:0932843469}  Weight Bearing Status/Restrictions: [address]  Weight Bearin}  Other Medical Equipment (for information only, NOT a DME order):  {EQUIPMENT:555199222}  Other Treatments: ***    Patient's personal belongings (please select all that are sent with patient):  {SIDNEY DME Belongings:206470613}    KRISTA SIGNATURE:  {Esignature:096725337}    CASE

## 2020-07-23 NOTE — PROGRESS NOTES
[facility]  Progress Note                   Date:   7/23/2020  Patient name: Shara Harrison  Date of admission:  7/22/2020  7:09 AM  MRN:   [id number]  YOB: 1980  PCP: No primary care provider on file. Reason for Admission: Chest pain [R07.9]  Chest pain [R07.9]  Chest pain [R07.9]    Subjective:       Clinical Changes /Abnormalities:  Patient seen and examined sitting up in chair in room. Denies chest pain or SOB. S/p cath 7/22/2020 with PTCA/KAROL to prox and mid LAD. Awaiting results of ECHO. Review of Systems    Medications:   Scheduled Meds:   aspirin  324 mg Oral Once    sodium chloride flush  10 mL Intravenous 2 times per day    aspirin  81 mg Oral Daily    ticagrelor  90 mg Oral BID    atorvastatin  40 mg Oral Nightly    metoprolol tartrate  12.5 mg Oral BID    heparin (porcine)  5,000 Units Subcutaneous 3 times per day    lisinopril  10 mg Oral Daily    amLODIPine  5 mg Oral Daily     Continuous Infusions:   sodium chloride Stopped (07/22/20 2230)     CBC:   Recent Labs     07/22/20  0832 07/23/20  0646   WBC 4.4 6.9   HGB 10.7* 12.4    254     BMP:    Recent Labs     07/22/20  0832 07/22/20  1742 07/23/20  0646     --  141   K 3.6* 3.9 4.1     --  105   CO2 24  --  20   BUN 9  --  7   CREATININE 0.53  --  0.56   GLUCOSE 121*  --  98     Hepatic:No results for input(s): AST, ALT, ALB, BILITOT, ALKPHOS in the last 72 hours. Troponin:   Recent Labs     07/22/20  1742 07/23/20  0012 07/23/20  0646   TROPHS 36* 27* 25*     BNP: No results for input(s): BNP in the last 72 hours. Lipids:   Recent Labs     07/22/20  1742   CHOL 129   HDL 45     INR: No results for input(s): INR in the last 72 hours.     DIAGNOSTIC DATA  CATH 7/22/2020    Angiographic Findings      Cardiac Arteries and Lesion Findings     LMCA: Mild irregularities 20-30%.     LAD: 90% proximal stenosis reduced to 0% using 2.75x38 mm Xience stent, post  dilated using 3.5 mm NC balloon. 70% mid stenosis reduced to 0% using 2.5x12  mm Xience stent. Lesion on Prox LAD: Proximal subsection. 90% stenosis 15 mm length reduced    to 0%. Pre procedure CHARLES III flow was noted. Post Procedure CHARLES III    flow was present. Good runoff was present. The lesion was diagnosed as    Moderate Risk (B). Devices used       - Runthrough NS. Number of passes: 1.       - Mini Trek Balloon 2.0mm x 15mm. 3 inflation(s) to a max pressure of:    10 aleah. - [place] 2.75 x 38 KRAOL. 1 inflation(s) to a max pressure of: 12    aleah. - Quantum Tavernier NC 3.5x20mm. 3 inflation(s) to a max pressure of: 14    aleah. Lesion on Mid LAD: Mid subsection. 70% stenosis 8 mm length reduced to 0%. Pre procedure CHARLES III flow was noted. Post Procedure CHARLES III flow was    present. Good runoff was present. The lesion was diagnosed as Moderate    Risk (B). Devices used       - [place] 2.5 x 15 KAROL. 1 inflation(s) to a max pressure of: 7    aleah. LCx: Mild irregularities 10-20%. 40% OM1 stenosis     RCA: Mild irregularities 10-20%.     Coronary Tree      Dominance: Right     LV Analysis  LV function assessed as:Normal.  Ejection Fraction  +----------------------------------------------------------------------+---+  ! Method                                                                ! EF%! +----------------------------------------------------------------------+---+  ! LV gram                                                               !55 !  +----------------------------------------------------------------------+---+      Objective:   Vitals: BP (!) 142/82   Pulse 91   Temp 97.5 °F (36.4 °C) (Oral)   Resp 18   Ht 5' 4\" (1.626 m)   Wt 160 lb (72.6 kg)   LMP  (Within Months)   SpO2 100%   BMI 27.46 kg/m²   General appearance: alert and cooperative with exam  HEENT: Head: Normocephalic, no lesions, without obvious abnormality.   Neck:no JVD, trachea midline, no adenopathy  Lungs: Clear to auscultation  Heart: Regular rate and rhythm, s1/s2 auscultated, no murmurs  Abdomen: soft, non-tender, bowel sounds active  Extremities: no edema  Neurologic: not done  Right femoral artery site:  CDI  Without ecchymosis or hematoma. +pulse    Coronary Discharge Core Measure: Please indicate the medication given by X, and if not the reasons not given:    Not Given Reason  Given      Beta Blockers X      ACE-I X      Statins X      ASA X     Brilinta samples, prescription and Rx discount card given. escript to pharmacy for Meds to Beds OAP (Plavix/Effient/Brilinta) X    SL Nitro X         Assessment / Acute Cardiac Problems:   1. Chest pain typical EKG concerning for wellens syndrome. 2. Essential HTN  3. Psychiatric disorder     Patient Active Problem List:     Psychosis ([facility]     Hypokalemia     Iron deficiency anemia secondary to inadequate dietary iron intake     Paranoid delusion ([facility] [facility]     Essential hypertension     Chest pain     STEMI involving left anterior descending coronary artery ([facility]      Plan of Treatment:   1. Chest pain:  S/p cardiac cath 7/22/2020  With PTCA/KAROL Proximal and mid LAD. Discussed in detail with patient post cath POC including but not limited to medications, diet, exercise, right radial artery site care, and follow-up. Questions and concerns addressed. OK for discharge from cardiology standpoint today after ECHO. F/U in office in 1-3 weeks. 2.   HTN  Controlled. Continue BB, ACE, and CCB  3. Awaiting ECHO today then okay to discharge per cardiology. F/u appointment in 2 weeks scheduled.       Electronically signed by CARISSA Garcia NP on 7/23/2020 at 8:56 AM  [address]  [phone]

## 2020-07-23 NOTE — PROGRESS NOTES
Occupational [address]  Occupational Therapy Not Seen Note    DATE: 2020  Name: Yadi Rosado  : 1980  MRN: [id number]    Patient not available for Occupational Therapy due to:    [] Testing:    [] Hemodialysis    [] Blood Transfusion in Progress    []Refusal by Patient:    [] Surgery/Procedure:    [] Strict Bedrest    [] Sedation    [] Spine Precautions     [] Pt being transferred to palliative care at this time. Spoke with pt/family and OT services to be defered. [x] Pt independent with functional mobility and functional tasks this date. Pt completed functional mobility around hospital hallways independently. Pt reports she has completed showering, toileting and dressing tasks without assistance in hospital room. Pt with no OT acute care needs at this time, will defer OT eval. Pt educated to report to Rn/MD if functional changes occur during acute hospitalization stay for OT evaluation.      [] Other    Next Scheduled Treatment: Discontinued     Attila Corey OTR/L

## 2020-07-24 LAB
ANTI-NUCLEAR ANTIBODY (ANA): NEGATIVE
EKG ATRIAL RATE: 72 BPM
EKG ATRIAL RATE: 74 BPM
EKG P AXIS: 28 DEGREES
EKG P AXIS: 40 DEGREES
EKG P-R INTERVAL: 178 MS
EKG P-R INTERVAL: 182 MS
EKG Q-T INTERVAL: 410 MS
EKG Q-T INTERVAL: 420 MS
EKG QRS DURATION: 84 MS
EKG QRS DURATION: 98 MS
EKG QTC CALCULATION (BAZETT): 455 MS
EKG QTC CALCULATION (BAZETT): 459 MS
EKG R AXIS: 26 DEGREES
EKG R AXIS: 29 DEGREES
EKG T AXIS: 13 DEGREES
EKG T AXIS: 24 DEGREES
EKG VENTRICULAR RATE: 72 BPM
EKG VENTRICULAR RATE: 74 BPM

## 2020-08-07 NOTE — TELEPHONE ENCOUNTER
Pt requested medications to be fill next ov 08.14.20  Pt also is requesting someone from the office to give her a call her has some questions regarding medications

## 2020-08-07 NOTE — TELEPHONE ENCOUNTER
Refill request for pended medications. If appropriate please send medication(s) to patients pharmacy.     Next appt: 8/14/2020 Grant Huerta as New Pt     Health Maintenance   Topic Date Due    Pneumococcal 0-64 years Vaccine (1 of 1 - PPSV23) 05/07/1985    HIV screen  05/07/1994    DTaP/Tdap/Td vaccine (1 - Tdap) 05/07/1998    Cervical cancer screen  05/07/2000    Flu vaccine (1) 09/01/2020    Lipid screen  07/22/2021    Potassium monitoring  07/23/2021    Creatinine monitoring  07/23/2021    Hepatitis A vaccine  Aged Out    Hepatitis B vaccine  Aged Out    Hib vaccine  Aged Out    Meningococcal (ACWY) vaccine  Aged Out       Hemoglobin A1C (%)   Date Value   06/14/2020 5.6             ( goal A1C is < 7)   No results found for: LABMICR  LDL Cholesterol (mg/dL)   Date Value   07/22/2020 70       (goal LDL is <100)   AST (U/L)   Date Value   06/14/2020 19     ALT (U/L)   Date Value   06/14/2020 15     BUN (mg/dL)   Date Value   07/23/2020 7     BP Readings from Last 3 Encounters:   07/23/20 131/76          (goal 120/80)          Patient Active Problem List:     Psychosis ([address]     Hypokalemia     Iron deficiency anemia secondary to inadequate dietary iron intake     Paranoid delusion ([address]     Essential hypertension     Chest pain     STEMI involving left anterior descending coronary artery ([address]

## 2020-08-13 ENCOUNTER — HOSPITAL ENCOUNTER (OUTPATIENT)
Dept: CARDIAC REHAB | Age: 40
Setting detail: THERAPIES SERIES
End: 2020-08-13
Payer: MEDICARE

## 2020-08-17 ENCOUNTER — HOSPITAL ENCOUNTER (OUTPATIENT)
Dept: CARDIAC REHAB | Age: 40
Setting detail: THERAPIES SERIES
Discharge: HOME | End: 2020-08-17
Payer: MEDICARE

## 2020-08-17 ENCOUNTER — OFFICE VISIT (OUTPATIENT)
Dept: OBGYN CLINIC | Age: 40
End: 2020-08-17
Payer: MEDICARE

## 2020-08-17 VITALS — HEIGHT: 64 IN | WEIGHT: 204.3 LBS | BODY MASS INDEX: 34.88 KG/M2

## 2020-08-17 VITALS
WEIGHT: 204 LBS | SYSTOLIC BLOOD PRESSURE: 142 MMHG | HEIGHT: 64 IN | DIASTOLIC BLOOD PRESSURE: 82 MMHG | BODY MASS INDEX: 34.83 KG/M2 | TEMPERATURE: 98.1 F

## 2020-08-17 PROCEDURE — 99386 PREV VISIT NEW AGE 40-64: CPT | Performed by: NURSE PRACTITIONER

## 2020-08-17 PROCEDURE — 93798 PHYS/QHP OP CAR RHAB W/ECG: CPT

## 2020-08-17 RX ORDER — ACETAMINOPHEN AND CODEINE PHOSPHATE 300; 30 MG/1; MG/1
TABLET ORAL
COMMUNITY
Start: 2020-08-03 | End: 2020-09-22 | Stop reason: ALTCHOICE

## 2020-08-17 RX ORDER — AMOXICILLIN 500 MG/1
CAPSULE ORAL
COMMUNITY
Start: 2020-08-03 | End: 2020-09-22 | Stop reason: ALTCHOICE

## 2020-08-17 ASSESSMENT — PATIENT HEALTH QUESTIONNAIRE - PHQ9
SUM OF ALL RESPONSES TO PHQ9 QUESTIONS 1 & 2: 0
SUM OF ALL RESPONSES TO PHQ QUESTIONS 1-9: 0
SUM OF ALL RESPONSES TO PHQ QUESTIONS 1-9: 0
1. LITTLE INTEREST OR PLEASURE IN DOING THINGS: 0
SUM OF ALL RESPONSES TO PHQ QUESTIONS 1-9: 0
2. FEELING DOWN, DEPRESSED OR HOPELESS: 0

## 2020-08-17 NOTE — PROGRESS NOTES
History and Physical  [address], [address] [facility] [address], [address] [address] [phone]   Fax [phone]  Bruno Hicks  2020              38 y.o. Chief Complaint   Patient presents with   Olga Wheeler Doctor    Annual Exam       Patient's last menstrual period was 2020 (exact date). Primary Care Physician: No primary care provider on file. The patient was seen and examined. She has no chief complaint today and is here for her annual exam.  Her bowels are regular. There are no voiding complaints. She denies any bloating. She denies vaginal discharge and was counseled on STD's and the need for barrier contraception.      HPI : Bruno Hicks is a 38 y.o. female [id number]    Annual exam  NO chief complaint   ________________________________________________________________________  OB History    Para Term  AB Living   6 3 2 1 3 3   SAB TAB Ectopic Molar Multiple Live Births   2 0 0 0 0 3      # Outcome Date GA Lbr Marek/2nd Weight Sex Delivery Anes PTL Lv   6 2014           5 2014           4 AB 2013           3 Term  40w0d   M CS-Unspec   ARUN   2   36w0d   M CS-Unspec   ARUN   1 Term     F CS-Unspec   ARUN     Past Medical History:   Diagnosis Date    Anxiety     Hypertension                                                                    Past Surgical History:   Procedure Laterality Date    ACHILLES TENDON SURGERY      CARDIAC SURGERY  2020    stent placement, [facility]     CARPAL TUNNEL RELEASE       SECTION      x3     Family History   Problem Relation Age of Onset    Liver Disease Mother     Cancer Father         bowel    Sickle Cell Anemia Sister     Asthma Son      Social History     Socioeconomic History    Marital status: Single     Spouse name: Not on file    Number of children: Not on file    Years of education: Not on file    Highest education level: Not on file   Occupational History    Not on file   Social Needs    Financial resource strain: Not on file    Food insecurity     Worry: Not on file     Inability: Not on file    Transportation needs     Medical: Not on file     Non-medical: Not on file   Tobacco Use    Smoking status: Former Smoker     Packs/day: 0.50     Last attempt to quit: 2020     Years since quittin.0    Smokeless tobacco: Never Used   Substance and Sexual Activity    Alcohol use: Not Currently    Drug use: Not Currently     Types: Marijuana    Sexual activity: Not Currently   Lifestyle    Physical activity     Days per week: Not on file     Minutes per session: Not on file    Stress: Not on file   Relationships    Social connections     Talks on phone: Not on file     Gets together: Not on file     Attends Tenriism service: Not on file     Active member of club or organization: Not on file     Attends meetings of clubs or organizations: Not on file     Relationship status: Not on file    Intimate partner violence     Fear of current or ex partner: Not on file     Emotionally abused: Not on file     Physically abused: Not on file     Forced sexual activity: Not on file   Other Topics Concern    Not on file   Social History Narrative    Not on file       MEDICATIONS:  Current Outpatient Medications   Medication Sig Dispense Refill    amoxicillin (AMOXIL) 500 MG capsule take 1 capsule by mouth every 8 hours until finished      acetaminophen-codeine (TYLENOL #3) 300-30 MG per tablet take 1 tablet by mouth every 4 to 6 hours if needed for pain      aspirin 81 MG EC tablet Take 1 tablet by mouth daily 30 tablet 3    metoprolol tartrate (LOPRESSOR) 25 MG tablet Take 0.5 tablets by mouth 2 times daily 60 tablet 3    ticagrelor (BRILINTA) 90 MG TABS tablet Take 1 tablet by mouth 2 times daily 180 tablet 4    nitroGLYCERIN (NITROSTAT) 0.4 MG SL tablet Place 1 tablet under the tongue every 5 minutes as needed for Chest pain up was completed. Review Of Systems (11 point):  Constitutional: No fever, chills or malaise; No weight change or fatigue  Head and Eyes: No vision, Headache, Dizziness or trauma in last 12 months  ENT ROS: No hearing, Tinnitis, sinus or taste problems  Hematological and Lymphatic ROS:No Lymphoma, Von Willebrand's, Hemophillia or Bleeding History  Psych ROS: No Depression, Homicidal thoughts,suicidal thoughts, or anxiety  Breast ROS: No prior breast abnormalities or lumps  Respiratory ROS: No SOB, Pneumoniae,Cough, or Pulmonary Embolism History  Cardiovascular ROS: No Chest Pain with Exertion, Palpitations, Syncope, Edema, Arrhythmia  Gastrointestinal ROS: No Indigestion, Heartburn, Nausea, vomiting, Diarrhea, Constipation,or Bowel Changes; No Bloody Stools or melena  Genito-Urinary ROS: No Dysuria, Hematuria or Nocturia. No Urinary Incontinence or Vaginal Discharge  Musculoskeletal ROS: No Arthralgia, Arthritis,Gout,Osteoporosis or Rheumatism  Neurological ROS: No CVA, Migraines, Epilepsy, Seizure Hx, or Limb Weakness  Dermatological ROS: No Rash, Itching, Hives, Mole Changes or Cancer                                                                                                                                                                                                                                  PHYSICAL Exam:     Constitutional:  Vitals:    08/17/20 1107   BP: (!) 142/82   Site: Left Upper Arm   Position: Sitting   Cuff Size: Medium Adult   Temp: 98.1 °F (36.7 °C)   Weight: 204 lb (92.5 kg)   Height: 5' 4\" (1.626 m)         General Appearance: This  is a well Developed, well Nourished, well groomed female. Her BMI was reviewed. Nutritional decision making was discussed. Skin:  There was a Normal Inspection of the skin without rashes or lesions. There were no rashes. (Papular, Maculopapular, Hives, Pustular, Macular)     There were no lesions (Ulcers, Erythema, Abn.  Appearing Nevi) Lymphatic:  No Lymph Nodes were Palpable in the neck , axilla or groin.  0 # Of Lymph Nodes; Location ; Character [Normal]  [Shotty] [Tender] [Enlarged]     Neck and EENT:  The neck was supple. There were no masses   The thyroid was not enlarged and had no masses. Perrla, EOMI B/L, TMI B/L No Abnormalities. Throat inspected-No exudates or Masses, Nares Patent No Masses        Respiratory: The lungs were auscultated and found to be clear. There were no rales, rhonchi or wheezes. There was a good respiratory effort. Cardiovascular: The heart was in a regular rate and rhythm. . No S3 or S4. There was no murmur appreciated. Location, grade, and radiation are not applicable. Extremities: The patients extremities were without calf tenderness, edema, or varicosities. There was full range of motion in all four extremities. Pulses in all four extremities were appreciated and are 2/4. Abdomen: The abdomen was soft and non-tender. There were good bowel sounds in all quadrants and there was no guarding, rebound or rigidity. On evaluation there was no evidence of hepatosplenomegaly and there was no costal vertebral dali tenderness bilaterally. No hernias were appreciated. Abdominal Scars: intact    Psych: The patient had a normal Orientation to: Time, Place, Person, and Situation  There is no Mood / Affect changes    Breast:  (Chest)  normal appearance, no masses or tenderness  Self breast exams were reviewed in detail. Literature was given. Pelvic Exam:  Vulva and vagina appear normal. Bimanual exam reveals normal uterus and adnexa. Currently on menses     Rectal Exam:  exam declined by patient          Musculosk:  Normal Gait and station was noted. Digits were evaluated without abnormal findings. Range of motion, stability and strength were evaluated and found to be appropriate for the patients age. ASSESSMENT:      38 y.o. Annual   Diagnosis Orders   1.  Well female exam with routine gynecological exam  PAP SMEAR    CHESTER DIGITAL SCREEN W OR WO CAD BILATERAL   2. Special screening examination for human papillomavirus (HPV)  PAP SMEAR   3. Encounter for screening mammogram for malignant neoplasm of breast  CHESTER DIGITAL SCREEN W OR WO CAD BILATERAL          Chief Complaint   Patient presents with    Established New Doctor    Annual Exam          Past Medical History:   Diagnosis Date    Anxiety     Hypertension          Patient Active Problem List    Diagnosis Date Noted    Chest pain 07/22/2020    STEMI involving left anterior descending coronary artery ([facility] 07/22/2020    Essential hypertension 06/15/2020    Hypokalemia 06/14/2020    Iron deficiency anemia secondary to inadequate dietary iron intake 06/14/2020    Paranoid delusion ([facility]     Psychosis ([facility] 06/13/2020          Hereditary Breast, Ovarian, Colon and Uterine Cancer screening Done. Tobacco & Secondary smoke risks reviewed; instructed on cessation and avoidance      Counseling Completed:  Preventative Health Recommendations and Follow up. The patient was informed of the recommended preventative health recommendations. 1. Annuals every year; Cytology collections per prevailing guidelines. 2. Mammograms begin every year at 37 yo if no abnormalities are found and no family history. 3. Bone density studies every 2-3 years. Begin at 73 yo. If no fracture history or osteoporosis family history. (significant). 4. Colonoscopy begin at 40 yo. Repeat every ten years if negative and no family history. 5. Calcium of 0740-1703 mg/day in split dosing  6. Vitamin D 400-800 IU/day  7. All other preventative health recommendations will be managed by the patients Primary care physician.              PLAN:  Return in about 1 year (around 8/17/2021) for annual.   Pap smear collected  Mammogram ordered   Currently on menses- will come next week for pap smear only  Refer to PCP    Repeat Annual every 1 year  Cervical Cytology Evaluation begins at 24years old. If Negative Cytology, Follow-up screening per current guidelines. Mammograms every 1 year. If 37 yo and last mammogram was negative. Calcium and Vitamin D dosing reviewed. Colonoscopy screening reviewed as well as onset for bone density testing. Birth control and barrier recommendations discussed. STD counseling and prevention reviewed. Gardisil counseling completed for all patients 10-37 yo. Routine health maintenance per patients PCP. Orders Placed This Encounter   Procedures    CHESTER DIGITAL SCREEN W OR WO CAD BILATERAL     Standing Status:   Future     Standing Expiration Date:   10/17/2021     Order Specific Question:   Reason for exam:     Answer:   SCREENING    PAP SMEAR     Patient History:    Patient's last menstrual period was 2020 (exact date). OBGYN Status: Having periods  Past Surgical History:  No date: ACHILLES TENDON SURGERY  2020: CARDIAC SURGERY      Comment:  stent placement, [facility]   No date: CARPAL TUNNEL RELEASE  No date:  SECTION      Comment:  x3      Social History    Tobacco Use      Smoking status: Former Smoker        Packs/day: 0.50        Quit date: 2020        Years since quittin.0      Smokeless tobacco: Never Used       Standing Status:   Future     Standing Expiration Date:   2021     Order Specific Question:   Collection Type     Answer: Thin Prep     Order Specific Question:   Prior Abnormal Pap Test     Answer:   No     Order Specific Question:   Screening or Diagnostic     Answer:   Screening     Order Specific Question:   HPV Requested?      Answer:   Yes     Order Specific Question:   High Risk Patient     Answer:   N/A

## 2020-08-18 ENCOUNTER — APPOINTMENT (OUTPATIENT)
Dept: CARDIAC REHAB | Age: 40
End: 2020-08-18
Payer: MEDICARE

## 2020-08-19 ENCOUNTER — HOSPITAL ENCOUNTER (OUTPATIENT)
Dept: CARDIAC REHAB | Age: 40
Setting detail: THERAPIES SERIES
Discharge: HOME | End: 2020-08-19
Payer: MEDICARE

## 2020-08-19 VITALS — WEIGHT: 200 LBS | BODY MASS INDEX: 34.33 KG/M2

## 2020-08-19 PROCEDURE — 93798 PHYS/QHP OP CAR RHAB W/ECG: CPT

## 2020-08-20 ENCOUNTER — APPOINTMENT (OUTPATIENT)
Dept: CARDIAC REHAB | Age: 40
End: 2020-08-20
Payer: MEDICARE

## 2020-08-24 ENCOUNTER — HOSPITAL ENCOUNTER (OUTPATIENT)
Dept: CARDIAC REHAB | Age: 40
Setting detail: THERAPIES SERIES
Discharge: HOME | End: 2020-08-24
Payer: MEDICARE

## 2020-08-25 ENCOUNTER — APPOINTMENT (OUTPATIENT)
Dept: CARDIAC REHAB | Age: 40
End: 2020-08-25
Payer: MEDICARE

## 2020-08-26 ENCOUNTER — HOSPITAL ENCOUNTER (OUTPATIENT)
Dept: CARDIAC REHAB | Age: 40
Setting detail: THERAPIES SERIES
Discharge: HOME | End: 2020-08-26
Payer: MEDICARE

## 2020-08-26 VITALS — WEIGHT: 202.4 LBS | BODY MASS INDEX: 34.74 KG/M2

## 2020-08-26 PROCEDURE — 93798 PHYS/QHP OP CAR RHAB W/ECG: CPT

## 2020-08-27 ENCOUNTER — APPOINTMENT (OUTPATIENT)
Dept: CARDIAC REHAB | Age: 40
End: 2020-08-27
Payer: MEDICARE

## 2020-08-28 PROBLEM — I25.10 CORONARY ARTERY DISEASE INVOLVING NATIVE CORONARY ARTERY OF NATIVE HEART WITHOUT ANGINA PECTORIS: Status: ACTIVE | Noted: 2020-08-28

## 2020-08-28 PROBLEM — E78.5 DYSLIPIDEMIA, GOAL LDL BELOW 70: Status: ACTIVE | Noted: 2020-08-28

## 2020-08-31 ENCOUNTER — HOSPITAL ENCOUNTER (OUTPATIENT)
Dept: CARDIAC REHAB | Age: 40
Setting detail: THERAPIES SERIES
Discharge: HOME | End: 2020-08-31
Payer: MEDICARE

## 2020-08-31 VITALS — WEIGHT: 202.2 LBS | BODY MASS INDEX: 34.71 KG/M2

## 2020-08-31 PROCEDURE — 93798 PHYS/QHP OP CAR RHAB W/ECG: CPT

## 2020-09-02 ENCOUNTER — HOSPITAL ENCOUNTER (OUTPATIENT)
Dept: CARDIAC REHAB | Age: 40
Setting detail: THERAPIES SERIES
Discharge: HOME | End: 2020-09-02
Payer: MEDICARE

## 2020-09-02 ENCOUNTER — TELEPHONE (OUTPATIENT)
Dept: PHARMACY | Age: 40
End: 2020-09-02

## 2020-09-02 VITALS — WEIGHT: 200.5 LBS | BODY MASS INDEX: 34.42 KG/M2

## 2020-09-02 PROCEDURE — 93798 PHYS/QHP OP CAR RHAB W/ECG: CPT

## 2020-09-02 NOTE — PROGRESS NOTES
Goal reviewed. She is working towards getting back into circuit training but her arm is sore from a fall a few weeks ago and she will be calling her PCP to get it looked at.

## 2020-09-02 NOTE — TELEPHONE ENCOUNTER
Patient attends Cardiac Rehab at [facility] and had expressed interest in coming to [facility] for Smoking Cessation. Referral was sent to Dr. Raciel Meyer. Patient scheduled for an appointment on 9/16 after Cardiac Rehab.    Edel Bradshaw, Pharm D, [facility]  9/2/2020 4:33 PM

## 2020-09-07 ENCOUNTER — APPOINTMENT (OUTPATIENT)
Dept: CARDIAC REHAB | Age: 40
End: 2020-09-07
Payer: MEDICARE

## 2020-09-09 ENCOUNTER — HOSPITAL ENCOUNTER (OUTPATIENT)
Dept: CARDIAC REHAB | Age: 40
Setting detail: THERAPIES SERIES
Discharge: HOME | End: 2020-09-09
Payer: MEDICARE

## 2020-09-14 ENCOUNTER — HOSPITAL ENCOUNTER (OUTPATIENT)
Dept: CARDIAC REHAB | Age: 40
Setting detail: THERAPIES SERIES
Discharge: HOME | End: 2020-09-14
Payer: MEDICARE

## 2020-09-14 VITALS — HEIGHT: 64 IN | WEIGHT: 200 LBS | BODY MASS INDEX: 34.15 KG/M2

## 2020-09-14 PROCEDURE — 93798 PHYS/QHP OP CAR RHAB W/ECG: CPT

## 2020-09-14 NOTE — PROGRESS NOTES
NUTRITION NOTE-CARDIAC REHABILITATION    Pt received nutritional counseling as a component of Cardiac Rehabilitation. Handouts provided regarding Heart Healthy - Reduced Sodium Nutrition Therapy. Pt appeared very receptive to information provided. Name and Office phone number provided for reference. Mega Askew R.D., L.D.   Phone: [phone]

## 2020-09-15 ENCOUNTER — TELEPHONE (OUTPATIENT)
Dept: PHARMACY | Age: 40
End: 2020-09-15

## 2020-09-15 NOTE — TELEPHONE ENCOUNTER
Spoke with patient for COVID 19 screening secondary to upcoming appointment tomorrow . At this time patient denies symptoms, recent travel and exposure. Patient will report to [facility] for INR check at scheduled time. Patient educated to call physician or go to ER with respiratory symptoms or fever and to not present to appointment if symptomatic. Will coordinate for next INR check accordingly.

## 2020-09-16 ENCOUNTER — HOSPITAL ENCOUNTER (OUTPATIENT)
Dept: CARDIAC REHAB | Age: 40
Setting detail: THERAPIES SERIES
Discharge: HOME | End: 2020-09-16
Payer: MEDICARE

## 2020-09-16 ENCOUNTER — TELEPHONE (OUTPATIENT)
Dept: PHARMACY | Age: 40
End: 2020-09-16

## 2020-09-21 ENCOUNTER — HOSPITAL ENCOUNTER (OUTPATIENT)
Dept: CARDIAC REHAB | Age: 40
Setting detail: THERAPIES SERIES
Discharge: HOME | End: 2020-09-21
Payer: MEDICARE

## 2020-09-22 ENCOUNTER — HOSPITAL ENCOUNTER (OUTPATIENT)
Age: 40
Setting detail: SPECIMEN
Discharge: HOME | End: 2020-09-22
Payer: MEDICARE

## 2020-09-22 ENCOUNTER — OFFICE VISIT (OUTPATIENT)
Dept: OBGYN CLINIC | Age: 40
End: 2020-09-22
Payer: MEDICARE

## 2020-09-22 VITALS
HEART RATE: 92 BPM | SYSTOLIC BLOOD PRESSURE: 134 MMHG | DIASTOLIC BLOOD PRESSURE: 82 MMHG | BODY MASS INDEX: 33.97 KG/M2 | WEIGHT: 199 LBS | TEMPERATURE: 98 F | HEIGHT: 64 IN

## 2020-09-22 LAB
ABSOLUTE EOS #: 0.2 K/UL (ref 0–0.4)
ABSOLUTE IMMATURE GRANULOCYTE: ABNORMAL K/UL (ref 0–0.3)
ABSOLUTE LYMPH #: 1.4 K/UL (ref 1–4.8)
ABSOLUTE MONO #: 0.4 K/UL (ref 0.1–1.3)
BASOPHILS # BLD: 1 % (ref 0–2)
BASOPHILS ABSOLUTE: 0 K/UL (ref 0–0.2)
DIFFERENTIAL TYPE: ABNORMAL
EOSINOPHILS RELATIVE PERCENT: 3 % (ref 0–4)
HCG QUANTITATIVE: <1 IU/L
HCT VFR BLD CALC: 34 % (ref 36–46)
HEMOGLOBIN: 11.2 G/DL (ref 12–16)
IMMATURE GRANULOCYTES: ABNORMAL %
INR BLD: 1
LYMPHOCYTES # BLD: 25 % (ref 24–44)
MCH RBC QN AUTO: 28.6 PG (ref 26–34)
MCHC RBC AUTO-ENTMCNC: 33 G/DL (ref 31–37)
MCV RBC AUTO: 86.6 FL (ref 80–100)
MONOCYTES # BLD: 7 % (ref 1–7)
NRBC AUTOMATED: ABNORMAL PER 100 WBC
PARTIAL THROMBOPLASTIN TIME: 30.4 SEC (ref 24–36)
PDW BLD-RTO: 16 % (ref 11.5–14.9)
PLATELET # BLD: 228 K/UL (ref 150–450)
PLATELET ESTIMATE: ABNORMAL
PMV BLD AUTO: 9.1 FL (ref 6–12)
PROTHROMBIN TIME: 13 SEC (ref 11.8–14.6)
RBC # BLD: 3.92 M/UL (ref 4–5.2)
RBC # BLD: ABNORMAL 10*6/UL
SEG NEUTROPHILS: 64 % (ref 36–66)
SEGMENTED NEUTROPHILS ABSOLUTE COUNT: 3.4 K/UL (ref 1.3–9.1)
TSH SERPL DL<=0.05 MIU/L-ACNC: 0.82 MIU/L (ref 0.3–5)
WBC # BLD: 5.4 K/UL (ref 3.5–11)
WBC # BLD: ABNORMAL 10*3/UL

## 2020-09-22 PROCEDURE — G8417 CALC BMI ABV UP PARAM F/U: HCPCS | Performed by: NURSE PRACTITIONER

## 2020-09-22 PROCEDURE — 4004F PT TOBACCO SCREEN RCVD TLK: CPT | Performed by: NURSE PRACTITIONER

## 2020-09-22 PROCEDURE — 36415 COLL VENOUS BLD VENIPUNCTURE: CPT

## 2020-09-22 PROCEDURE — 84702 CHORIONIC GONADOTROPIN TEST: CPT

## 2020-09-22 PROCEDURE — 85025 COMPLETE CBC W/AUTO DIFF WBC: CPT

## 2020-09-22 PROCEDURE — 85730 THROMBOPLASTIN TIME PARTIAL: CPT

## 2020-09-22 PROCEDURE — G8427 DOCREV CUR MEDS BY ELIG CLIN: HCPCS | Performed by: NURSE PRACTITIONER

## 2020-09-22 PROCEDURE — 84443 ASSAY THYROID STIM HORMONE: CPT

## 2020-09-22 PROCEDURE — 99213 OFFICE O/P EST LOW 20 MIN: CPT | Performed by: NURSE PRACTITIONER

## 2020-09-22 PROCEDURE — 87624 HPV HI-RISK TYP POOLED RSLT: CPT

## 2020-09-22 PROCEDURE — 85610 PROTHROMBIN TIME: CPT

## 2020-09-22 PROCEDURE — G0145 SCR C/V CYTO,THINLAYER,RESCR: HCPCS

## 2020-09-22 NOTE — PROGRESS NOTES
Amy Cruz  2020    YOB: 1980          The patient was seen today. She is here regarding heavy, painful menses- increasingly getting worse over the past few months. Has monthly menses and bleeds for 5 days, sometimes heavy, passing large clots. Complaining of menstrual cramping 3-4 days prior to start of period. Takes motrin with no relief. History of 3 previous C/S. Does not want any more children. Currently in relationship with female. Wants an ablation. Hx of ovarian cysts in . Patient concerned she may have another cyst since her periods are very painful. Hx of MI with cardiac stent placement 1-2 months ago. Currently on blood thinner Brilinta and hypertension medications. Her bowels are regular and she is voiding without difficulty.      HPI:  Amy Cruz is a 38 y.o. female [id number]     Heavy, painful menses      OB History    Para Term  AB Living   6 3 2 1 3 3   SAB TAB Ectopic Molar Multiple Live Births   2 0 0 0 0 3      # Outcome Date GA Lbr Marek/2nd Weight Sex Delivery Anes PTL Lv   6 SAB            5 SAB 2014           4 AB 2013           3 Term  40w0d   M CS-Unspec   ARUN   2   36w0d   M CS-Unspec   ARUN   1 Term 18    F CS-Unspec   ARUN       Past Medical History:   Diagnosis Date    Anxiety     Hypertension        Past Surgical History:   Procedure Laterality Date    ACHILLES TENDON SURGERY      CARDIAC SURGERY  2020    stent placement, [facility]     CARPAL TUNNEL RELEASE       SECTION      x3       Family History   Problem Relation Age of Onset    Liver Disease Mother     Cancer Father         bowel    Sickle Cell Anemia Sister     Asthma Son        Social History     Socioeconomic History    Marital status: Single     Spouse name: Not on file    Number of children: Not on file    Years of education: Not on file    Highest education level: Not on file   Occupational History    Not on file Social Needs    Financial resource strain: Not on file    Food insecurity     Worry: Not on file     Inability: Not on file    Transportation needs     Medical: Not on file     Non-medical: Not on file   Tobacco Use    Smoking status: Light Tobacco Smoker     Packs/day: 0.50     Types: Cigarettes     Last attempt to quit: 2020     Years since quittin.1    Smokeless tobacco: Never Used   Substance and Sexual Activity    Alcohol use: Not Currently    Drug use: Not Currently     Types: Marijuana    Sexual activity: Not Currently   Lifestyle    Physical activity     Days per week: Not on file     Minutes per session: Not on file    Stress: Not on file   Relationships    Social connections     Talks on phone: Not on file     Gets together: Not on file     Attends Yarsani service: Not on file     Active member of club or organization: Not on file     Attends meetings of clubs or organizations: Not on file     Relationship status: Not on file    Intimate partner violence     Fear of current or ex partner: Not on file     Emotionally abused: Not on file     Physically abused: Not on file     Forced sexual activity: Not on file   Other Topics Concern    Not on file   Social History Narrative    Not on file         MEDICATIONS:  Current Outpatient Medications   Medication Sig Dispense Refill    aspirin 81 MG EC tablet Take 1 tablet by mouth daily 30 tablet 3    metoprolol tartrate (LOPRESSOR) 25 MG tablet Take 0.5 tablets by mouth 2 times daily 60 tablet 3    ticagrelor (BRILINTA) 90 MG TABS tablet Take 1 tablet by mouth 2 times daily 180 tablet 4    nitroGLYCERIN (NITROSTAT) 0.4 MG SL tablet Place 1 tablet under the tongue every 5 minutes as needed for Chest pain up to max of 3 total doses.  If no relief after 1 dose, call 911. 25 tablet 3    atorvastatin (LIPITOR) 40 MG tablet Take 1 tablet by mouth nightly 30 tablet 3    lisinopril (PRINIVIL;ZESTRIL) 10 MG tablet Take 1 tablet by mouth daily 14 tablet 0    amLODIPine (NORVASC) 5 MG tablet Take 1 tablet by mouth daily 14 tablet 0     No current facility-administered medications for this visit. ALLERGIES:  Allergies as of 09/22/2020 - Review Complete 09/22/2020   Allergen Reaction Noted    Latex  12/09/2018         REVIEW OF SYSTEMS:    yes   A minimum of an eleven point review of systems was completed. Review Of Systems (11 point):  Constitutional: No fever, chills or malaise; No weight change or fatigue  Head and Eyes: No vision, Headache, Dizziness or trauma in last 12 months  ENT ROS: No hearing, Tinnitis, sinus or taste problems  Hematological and Lymphatic ROS:No Lymphoma, Von Willebrand's, Hemophillia or Bleeding History  Psych ROS: No Depression, Homicidal thoughts,suicidal thoughts, or anxiety  Breast ROS: No prior breast abnormalities or lumps  Respiratory ROS: No SOB, Pneumoniae,Cough, or Pulmonary Embolism History  Cardiovascular ROS: No Chest Pain with Exertion, Palpitations, Syncope, Edema, Arrhythmia  Gastrointestinal ROS: No Indigestion, Heartburn, Nausea, vomiting, Diarrhea, Constipation,or Bowel Changes; No Bloody Stools or melena  Genito-Urinary ROS: No Dysuria, Hematuria or Nocturia. No Urinary Incontinence or Vaginal Discharge. + heavy, painful menses  Musculoskeletal ROS: No Arthralgia, Arthritis,Gout,Osteoporosis or Rheumatism  Neurological ROS: No CVA, Migraines, Epilepsy, Seizure Hx, or Limb Weakness  Dermatological ROS: No Rash, Itching, Hives, Mole Changes or Cancer          Blood pressure 134/82, pulse 92, temperature 98 °F (36.7 °C), height 5' 4\" (1.626 m), weight 199 lb (90.3 kg), not currently breastfeeding. Abdomen: Soft non-tender; good bowel sounds. No guarding, rebound or rigidity. No CVA tenderness bilaterally. Extremities: No calf tenderness, DTR 2/4, and No edema bilaterally    Pelvic: Vulva and vagina appear normal. Bimanual exam reveals normal uterus and adnexa.     Diagnostics:  No results found.     Lab Results:  Results for orders placed or performed during the hospital encounter of 75/65/27   Basic Metabolic Panel   Result Value Ref Range    Glucose 121 (H) 70 - 99 mg/dL    BUN 9 6 - 20 mg/dL    CREATININE 0.53 0.50 - 0.90 mg/dL    Bun/Cre Ratio NOT REPORTED 9 - 20    Calcium 8.7 8.6 - 10.4 mg/dL    Sodium 136 135 - 144 mmol/L    Potassium 3.6 (L) 3.7 - 5.3 mmol/L    Chloride 104 98 - 107 mmol/L    CO2 24 20 - 31 mmol/L    Anion Gap 8 (L) 9 - 17 mmol/L    GFR Non-African American >60 >60 mL/min    GFR African American >60 >60 mL/min    GFR Comment          GFR Staging NOT REPORTED    Brain Natriuretic Peptide   Result Value Ref Range    Pro-BNP 80 <300 pg/mL    BNP Interpretation Pro-BNP Reference Range:    CBC Auto Differential   Result Value Ref Range    WBC 4.4 3.5 - 11.3 k/uL    RBC 3.73 (L) 3.95 - 5.11 m/uL    Hemoglobin 10.7 (L) 11.9 - 15.1 g/dL    Hematocrit 34.4 (L) 36.3 - 47.1 %    MCV 92.2 82.6 - 102.9 fL    MCH 28.7 25.2 - 33.5 pg    MCHC 31.1 28.4 - 34.8 g/dL    RDW 15.1 (H) 11.8 - 14.4 %    Platelets 870 688 - 926 k/uL    MPV 10.9 8.1 - 13.5 fL    NRBC Automated 0.0 0.0 per 100 WBC    Differential Type NOT REPORTED     Seg Neutrophils 65 36 - 65 %    Lymphocytes 23 (L) 24 - 43 %    Monocytes 6 3 - 12 %    Eosinophils % 5 (H) 1 - 4 %    Basophils 1 0 - 2 %    Immature Granulocytes 0 0 %    Segs Absolute 2.88 1.50 - 8.10 k/uL    Absolute Lymph # 1.03 (L) 1.10 - 3.70 k/uL    Absolute Mono # 0.28 0.10 - 1.20 k/uL    Absolute Eos # 0.21 0.00 - 0.44 k/uL    Basophils Absolute <0.03 0.00 - 0.20 k/uL    Absolute Immature Granulocyte <0.03 0.00 - 0.30 k/uL    WBC Morphology NOT REPORTED     RBC Morphology ANISOCYTOSIS PRESENT     Platelet Estimate NOT REPORTED    Troponin   Result Value Ref Range    Troponin, High Sensitivity 14 0 - 14 ng/L    Troponin T NOT REPORTED <0.03 ng/mL    Troponin Interp NOT REPORTED    Troponin   Result Value Ref Range    Troponin, High Sensitivity 18 (H) 0 - 14 ng/L Troponin T NOT REPORTED <0.03 ng/mL    Troponin Interp NOT REPORTED    D-Dimer, Quantitative   Result Value Ref Range    D-Dimer, Quant 0.45 mg/L FEU   APTT   Result Value Ref Range    PTT 46.9 (H) 20.5 - 30.5 sec   APTT   Result Value Ref Range    PTT 26.3 20.5 - 30.5 sec   DRUG SCREEN MULTI URINE   Result Value Ref Range    Amphetamine Screen, Ur NEGATIVE NEGATIVE    Barbiturate Screen, Ur NEGATIVE NEGATIVE    Benzodiazepine Screen, Urine POSITIVE (A) NEGATIVE    Cocaine Metabolite, Urine NEGATIVE NEGATIVE    Methadone Screen, Urine NEGATIVE NEGATIVE    Opiates, Urine POSITIVE (A) NEGATIVE    Phencyclidine, Urine NEGATIVE NEGATIVE    Propoxyphene, Urine NOT REPORTED NEGATIVE    Cannabinoid Scrn, Ur POSITIVE (A) NEGATIVE    Oxycodone Screen, Ur NEGATIVE NEGATIVE    Methamphetamine, Urine NOT REPORTED NEGATIVE    Tricyclic Antidepressants, Urine NOT REPORTED NEGATIVE    MDMA, Urine NOT REPORTED NEGATIVE    Buprenorphine Urine NOT REPORTED NEGATIVE    Test Information       Assay provides medical screening only. The absence of expected drug(s) and/or metabolite(s) may indicate diluted or adulterated urine, limitations of testing or timing of collection. COVID-19    Specimen: Other   Result Value Ref Range    SARS-CoV-2          SARS-CoV-2, Rapid Not Detected Not Detected    Source . NASOPHARYNGEAL SWAB     SARS-CoV-2, PCR         Iron and TIBC   Result Value Ref Range    Iron 68 37 - 145 ug/dL    TIBC 389 250 - 450 ug/dL    Iron Saturation 17 (L) 20 - 55 %    UIBC 321 112 - 347 ug/dL   Ferritin   Result Value Ref Range    Ferritin 13 13 - 150 ug/L   Troponin   Result Value Ref Range    Troponin, High Sensitivity 36 (H) 0 - 14 ng/L    Troponin T NOT REPORTED <0.03 ng/mL    Troponin Interp NOT REPORTED    LIPID PANEL   Result Value Ref Range    Cholesterol 129 <200 mg/dL    HDL 45 >40 mg/dL    LDL Cholesterol 70 0 - 130 mg/dL    Chol/HDL Ratio 2.9 <5    Triglycerides 68 <150 mg/dL    VLDL NOT REPORTED 1 - 30 mg/dL Potassium   Result Value Ref Range    Potassium 3.9 3.7 - 5.3 mmol/L   Basic Metabolic Panel w/ Reflex to MG   Result Value Ref Range    Glucose 98 70 - 99 mg/dL    BUN 7 6 - 20 mg/dL    CREATININE 0.56 0.50 - 0.90 mg/dL    Bun/Cre Ratio NOT REPORTED 9 - 20    Calcium 8.9 8.6 - 10.4 mg/dL    Sodium 141 135 - 144 mmol/L    Potassium 4.1 3.7 - 5.3 mmol/L    Chloride 105 98 - 107 mmol/L    CO2 20 20 - 31 mmol/L    Anion Gap 16 9 - 17 mmol/L    GFR Non-African American >60 >60 mL/min    GFR African American >60 >60 mL/min    GFR Comment          GFR Staging NOT REPORTED    CBC auto differential   Result Value Ref Range    WBC 6.9 3.5 - 11.3 k/uL    RBC 4.38 3.95 - 5.11 m/uL    Hemoglobin 12.4 11.9 - 15.1 g/dL    Hematocrit 40.0 36.3 - 47.1 %    MCV 91.3 82.6 - 102.9 fL    MCH 28.3 25.2 - 33.5 pg    MCHC 31.0 28.4 - 34.8 g/dL    RDW 15.0 (H) 11.8 - 14.4 %    Platelets 414 128 - 078 k/uL    MPV 10.9 8.1 - 13.5 fL    NRBC Automated 0.0 0.0 per 100 WBC    Differential Type NOT REPORTED     WBC Morphology NOT REPORTED     RBC Morphology ANISOCYTOSIS PRESENT     Platelet Estimate NOT REPORTED     Seg Neutrophils 72 (H) 36 - 65 %    Lymphocytes 17 (L) 24 - 43 %    Monocytes 7 3 - 12 %    Eosinophils % 3 1 - 4 %    Basophils 0 0 - 2 %    Immature Granulocytes 0 0 %    Segs Absolute 4.97 1.50 - 8.10 k/uL    Absolute Lymph # 1.19 1.10 - 3.70 k/uL    Absolute Mono # 0.47 0.10 - 1.20 k/uL    Absolute Eos # 0.22 0.00 - 0.44 k/uL    Basophils Absolute <0.03 0.00 - 0.20 k/uL    Absolute Immature Granulocyte <0.03 0.00 - 0.30 k/uL   Troponin   Result Value Ref Range    Troponin, High Sensitivity 27 (H) 0 - 14 ng/L    Troponin T NOT REPORTED <0.03 ng/mL    Troponin Interp NOT REPORTED    Troponin   Result Value Ref Range    Troponin, High Sensitivity 25 (H) 0 - 14 ng/L    Troponin T NOT REPORTED <0.03 ng/mL    Troponin Interp NOT REPORTED    VITAMIN B12 & FOLATE   Result Value Ref Range    Vitamin B-12 354 232 - 1245 pg/mL    Folate 14.4 >4.8 ng/mL   KATHY Screen with Reflex   Result Value Ref Range    KATHY NEGATIVE NEGATIVE   POCT urine pregnancy   Result Value Ref Range    HCG, Pregnancy Urine (POC) NEGATIVE NEGATIVE   EKG 12 Lead   Result Value Ref Range    Ventricular Rate 72 BPM    Atrial Rate 72 BPM    P-R Interval 178 ms    QRS Duration 98 ms    Q-T Interval 420 ms    QTc Calculation (Bazett) 459 ms    P Axis 28 degrees    R Axis 26 degrees    T Axis 24 degrees   EKG 12 Lead   Result Value Ref Range    Ventricular Rate 74 BPM    Atrial Rate 74 BPM    P-R Interval 182 ms    QRS Duration 84 ms    Q-T Interval 410 ms    QTc Calculation (Bazett) 455 ms    P Axis 40 degrees    R Axis 29 degrees    T Axis 13 degrees   ECHO Complete 2D W Doppler W Color   Result Value Ref Range    Left Ventricular Ejection Fraction 55     LVEF MODALITY ECHO          Assessment:   Diagnosis Orders   1. Menorrhagia with regular cycle  CBC Auto Differential    TSH with Reflex    HCG, Quantitative, Pregnancy    Protime-INR    APTT    US NON OB TRANSVAGINAL    US PELVIS COMPLETE   2. Preventative health care  PAP SMEAR    CHESTER DIGITAL SCREEN W OR WO CAD BILATERAL   3. Special screening examination for human papillomavirus (HPV)  PAP SMEAR   4.  Encounter for screening mammogram for malignant neoplasm of breast  CHESTER DIGITAL SCREEN W OR WO CAD BILATERAL     Patient Active Problem List    Diagnosis Date Noted    Coronary artery disease involving native coronary artery of native heart without angina pectoris 08/28/2020    Dyslipidemia, goal LDL below 70 08/28/2020    Chest pain 07/22/2020    STEMI involving left anterior descending coronary artery ([address] 07/22/2020    Essential hypertension 06/15/2020    Hypokalemia 06/14/2020    Iron deficiency anemia secondary to inadequate dietary iron intake 06/14/2020    Paranoid delusion ([address]     Psychosis ([address] 06/13/2020           PLAN:  Return in about 4 weeks (around 10/20/2020) for physician/ heavy menses- wants ablation. Pap smear obtained. Lab slips given. Pelvic ultrasound ordered. Mammogram ordered. Follow up with physician to discuss result and plan of care. Repeat Annual every 1 year  Cervical Cytology Evaluation begins at 24years old. If Negative Cytology, Follow-up screening per current guidelines. Return to the office in 4 weeks. Counseled on preventative health maintenance follow-up. Orders Placed This Encounter   Procedures    US NON OB TRANSVAGINAL     Standing Status:   Future     Standing Expiration Date:   3/22/2021     Order Specific Question:   Reason for exam:     Answer:   heavy menses    US PELVIS COMPLETE     Standing Status:   Future     Standing Expiration Date:   2020     Order Specific Question:   Reason for exam:     Answer:   heavy menses    CHESTER DIGITAL SCREEN W OR WO CAD BILATERAL     Standing Status:   Future     Standing Expiration Date:   2021     Order Specific Question:   Reason for exam:     Answer:   screening for breast cancer    PAP SMEAR     Patient History:    No LMP recorded. OBGYN Status: Having periods  Past Surgical History:  No date: ACHILLES TENDON SURGERY  2020: CARDIAC SURGERY      Comment:  stent placement, [facility]   No date: CARPAL TUNNEL RELEASE  No date:  SECTION      Comment:  x3      Social History    Tobacco Use      Smoking status: Light Tobacco Smoker        Packs/day: 0.50        Types: Cigarettes        Quit date: 2020        Years since quittin.1      Smokeless tobacco: Never Used       Standing Status:   Future     Number of Occurrences:   1     Standing Expiration Date:   2021     Order Specific Question:   Collection Type     Answer: Thin Prep     Order Specific Question:   Prior Abnormal Pap Test     Answer:   No     Order Specific Question:   Screening or Diagnostic     Answer:   Screening     Order Specific Question:   HPV Requested?      Answer:   Yes     Order Specific Question:   High Risk Patient

## 2020-09-23 ENCOUNTER — HOSPITAL ENCOUNTER (OUTPATIENT)
Dept: CARDIAC REHAB | Age: 40
Setting detail: THERAPIES SERIES
Discharge: HOME | End: 2020-09-23
Payer: MEDICARE

## 2020-09-24 ENCOUNTER — TELEPHONE (OUTPATIENT)
Dept: PHARMACY | Age: 40
End: 2020-09-24

## 2020-09-24 NOTE — TELEPHONE ENCOUNTER
Spoke with patient for COVID 19 screening secondary to upcoming appointment tomorrow . At this time patient denies symptoms, recent travel and exposure. Patient will report to [facility] for smoking medication managment at scheduled time. Patient educated to call physician or go to ER with respiratory symptoms or fever and to not present to appointment if symptomatic. Will coordinate for next appoointment accordingly.

## 2020-09-25 ENCOUNTER — HOSPITAL ENCOUNTER (OUTPATIENT)
Dept: PHARMACY | Age: 40
Setting detail: THERAPIES SERIES
Discharge: HOME | End: 2020-09-25
Payer: MEDICARE

## 2020-09-25 PROCEDURE — 99213 OFFICE O/P EST LOW 20 MIN: CPT

## 2020-09-25 NOTE — PROGRESS NOTES
coffee, the second during the day at various times depending on stressors. Does not smoke indoors, smokes on porch. Remove ashtrays, discard all cigarettes night before target stop date. Discussed behavior modifications:  Brush her teeth before and after eating breakfast to avoid morning cigarette. Try to wait 15-30 minutes to see if urge abates, have carrots/celery, pretzel sticks available to help keep hands and mouth busy during that urge. Discussed relaxation techniques: deep breathing, calming music, walking    - Pharmacist to follow up 1 week after planned stop date. Patient encouraged to call if she has any questions or difficulties, or wishes to change her stop smoking date.    -Spent more than 60 minutes discussing smoking cessation with patient during visit.   -progress note routed to referring physician    Electronically signed by LONNY Whelan [facility] on 9/25/2020 at 1:16 PM

## 2020-09-27 LAB
HPV SOURCE: NORMAL
HPV, GENOTYPE 16: NOT DETECTED
HPV, GENOTYPE 18: NOT DETECTED
HPV, HIGH RISK OTHER: NOT DETECTED

## 2020-09-28 ENCOUNTER — HOSPITAL ENCOUNTER (OUTPATIENT)
Dept: CARDIAC REHAB | Age: 40
Setting detail: THERAPIES SERIES
Discharge: HOME | End: 2020-09-28
Payer: MEDICARE

## 2020-09-29 ENCOUNTER — OFFICE VISIT (OUTPATIENT)
Dept: FAMILY MEDICINE CLINIC | Age: 40
End: 2020-09-29
Payer: MEDICARE

## 2020-09-29 VITALS
TEMPERATURE: 97.1 F | SYSTOLIC BLOOD PRESSURE: 135 MMHG | WEIGHT: 200 LBS | HEIGHT: 64 IN | BODY MASS INDEX: 34.15 KG/M2 | DIASTOLIC BLOOD PRESSURE: 88 MMHG | HEART RATE: 73 BPM

## 2020-09-29 PROBLEM — M79.672 CHRONIC FOOT PAIN, LEFT: Status: ACTIVE | Noted: 2020-09-29

## 2020-09-29 PROBLEM — G89.29 CHRONIC LEFT SHOULDER PAIN: Status: ACTIVE | Noted: 2020-09-29

## 2020-09-29 PROBLEM — G89.29 CHRONIC FOOT PAIN, LEFT: Status: ACTIVE | Noted: 2020-09-29

## 2020-09-29 PROBLEM — E66.9 CLASS 1 OBESITY WITH SERIOUS COMORBIDITY AND BODY MASS INDEX (BMI) OF 34.0 TO 34.9 IN ADULT: Status: ACTIVE | Noted: 2020-09-29

## 2020-09-29 PROBLEM — M25.512 CHRONIC LEFT SHOULDER PAIN: Status: ACTIVE | Noted: 2020-09-29

## 2020-09-29 PROBLEM — E66.811 CLASS 1 OBESITY WITH SERIOUS COMORBIDITY AND BODY MASS INDEX (BMI) OF 34.0 TO 34.9 IN ADULT: Status: ACTIVE | Noted: 2020-09-29

## 2020-09-29 PROCEDURE — 4004F PT TOBACCO SCREEN RCVD TLK: CPT | Performed by: STUDENT IN AN ORGANIZED HEALTH CARE EDUCATION/TRAINING PROGRAM

## 2020-09-29 PROCEDURE — G8427 DOCREV CUR MEDS BY ELIG CLIN: HCPCS | Performed by: STUDENT IN AN ORGANIZED HEALTH CARE EDUCATION/TRAINING PROGRAM

## 2020-09-29 PROCEDURE — 99211 OFF/OP EST MAY X REQ PHY/QHP: CPT | Performed by: FAMILY MEDICINE

## 2020-09-29 PROCEDURE — 99203 OFFICE O/P NEW LOW 30 MIN: CPT | Performed by: STUDENT IN AN ORGANIZED HEALTH CARE EDUCATION/TRAINING PROGRAM

## 2020-09-29 PROCEDURE — G8417 CALC BMI ABV UP PARAM F/U: HCPCS | Performed by: STUDENT IN AN ORGANIZED HEALTH CARE EDUCATION/TRAINING PROGRAM

## 2020-09-29 RX ORDER — LIDOCAINE 4 G/G
1 PATCH TOPICAL DAILY
Qty: 30 PATCH | Refills: 0 | Status: SHIPPED | OUTPATIENT
Start: 2020-09-29 | End: 2020-10-29

## 2020-09-29 ASSESSMENT — PATIENT HEALTH QUESTIONNAIRE - PHQ9
SUM OF ALL RESPONSES TO PHQ QUESTIONS 1-9: 0
SUM OF ALL RESPONSES TO PHQ9 QUESTIONS 1 & 2: 0
SUM OF ALL RESPONSES TO PHQ QUESTIONS 1-9: 0
1. LITTLE INTEREST OR PLEASURE IN DOING THINGS: 0
2. FEELING DOWN, DEPRESSED OR HOPELESS: 0

## 2020-09-29 NOTE — PROGRESS NOTES
Subjective:    Shaq Trimble is a 38 y.o. female with  has a past medical history of Anxiety and Hypertension. Family History   Problem Relation Age of Onset    Liver Disease Mother     Cancer Father         bowel    Sickle Cell Anemia Sister     Asthma Son        Presented tot office today for:  Chief Complaint   Patient presents with   Michael Coffey Doctor     here to establish care, states she had a fall in 2013. Injured her elbow and is still having pain.  Health Maintenance     refused    Pain     Pain mostly in her heel of her foot and shoots up her lower back, she also has some pain in shoulder. Using otc medication for treatment     HPI   Patient is here to establish care w/ a PCP  Prior PCP was  years ago  Patient will send records for review. Hypertension: Patient is here for evaluation of elevated blood pressures. Age at onset of elevated blood pressure: since 2004. Cardiac symptoms none. Patient denies chest pain, chest pressure/discomfort, claudication, dyspnea, exertional chest pressure/discomfort, fatigue, irregular heart beat, lower extremity edema, near-syncope, orthopnea, palpitations, paroxysmal nocturnal dyspnea, syncope and tachypnea. Cardiovascular risk factors: advanced age (older than 54 for men, 72 for women), dyslipidemia, family history of premature cardiovascular disease, hypertension, obesity (BMI >= 30 kg/m2), sedentary lifestyle and smoking/ tobacco exposure. Use of agents associated with hypertension: none. History of target organ damage: angina/ prior myocardial infarctionPatient is current on Brillianta and ASA. She is also taking amlodipine, metoprolol, lisinopril 10 mg, she is also on atorvastatin 40 mg nightly. Patient has a Cardiologist with plan for follow-up - October 13, 2020 (Jose Noel). Left Foot Pain  New patient being seen for regarding follow up of a pre-existing problem left foot/ankle pain.   The patient states the pain has been present for 7 years. Worsening over the past 2 months. As far as trauma to the area, the patient indicates - a fall - 2013. There is  pain with weight bearing. The patient states numbness and tingling is not present (4th/5th toes). Catching and locking has been present. She has tried - NSAIDS without relief. Patient had completed physical therapy in the past.    Shoulder Pain  New patient being seen for regarding left shoulder pain. The patient is a right hand dominant @SEX@ who has had shoulder pain for years. As far as trauma to the shoulder, the patient indicates - a fall in 2013. The pain is  worse at night and when doing overhead activities. Weakness of the shoulder has  been noted. The pain restricts activities such as overhead reaching. The pain does not seem to improve with time. The following medications have been tried: NSAIDS without benefit. Physical Therapy has been tried years ago. Corticosteroid injection has not been done. Neck pain has been present. S/p bilateral carpal tunnel syndrome in the past years ago. Prevention of Pregnancy/Contraception (e.g. OCP, Patch, Progesterone Only, Depot injection, LARC/implants) - none  Past Gyn (e.g. Ectopic, Endometriosis, Bartholin cyst, malignancy) - ovarian cysts  LMP - Sept 2, 2020. Age of Menarche - 4  Duration - 5-7days  Frequency (regular, predictable) - regular, every month  Blood flow (heavier than usual, blood clots, impacting day to day) - heavy  Pain  [id number]  Last Pap was 2 week ago, normal at time  Pt follows w/ an [address]- 0 currently; hx of panic attacks in the past, patient has been off medications at this time. Alcohol misuse - none  Smoking - 3-4 cigarettes/day, quit day is October 3, 2020. Advised smoking cessation. Smoking lowers your ability to fight infections, increasing healing time and significantly increases your risk for heart disease, lung disease and cancer.   [email or web address]-QUIT-NOW respiratory distress. Patient has no wheezes. Abdominal: Soft. Bowel sounds are normal. Patient exhibits no distension. There is no tenderness. Musculoskeletal:  Patient exhibits no edema and tenderness. Patient exhibits no deformity. Neurological: Patient is alert and oriented to person, place, and time. Skin: Skin is warm and dry. Patient is not diaphoretic. Psychiatric: Patient's speech is normal and behavior is normal. Thought content normal. Patient's mood appears anxious. Vitals reviewed. Foot/Ankle Exam:    Reveals there is not effusion. Swelling is not present. Edema is not present. Ecchymoses is not present. Palpation-Tenderness -none  The foot is in WNL alignment. ROM:  40 degrees plantarflexion and 20 degrees dorsiflexion. Subtalar motion is 30 degrees inversion and 20 degrees eversion. Strength-WNL  Sensation-normal to light touch  Special Tests-Ankle inversion: laxity negative  Ankle eversion: laxity negative  Ankle drawer: laxity negative  External rotation:negative  Syndesmotic Squeeze test: negative  The patient is  able to single toe raise. Gait: Normal    Left Shoulder Pain  Cervical spine ROM WNL  Spurlings: negative,   MSK:  Forward elevation WNL degrees, external rotation in neutral WNL degrees, abduction WNL degrees, internal rotation to WNL. Supraspinatus 5/5   External rotators 5/5  Internal 5/5  Full Can negative   Empty Can negative   Neer's test negative   Cheung-Giovanni test. negative. Pain with cross body adduction negative. Pain over anterolateral acromion positive. Subscap liftoff positive. Belly press test negative. Pain over AC joint positive. Pain over traps/rhomboids negative.        Lab Results   Component Value Date    WBC 5.4 09/22/2020    HGB 11.2 (L) 09/22/2020    HCT 34.0 (L) 09/22/2020     09/22/2020    CHOL 129 07/22/2020    TRIG 68 07/22/2020    HDL 45 07/22/2020    ALT 15 06/14/2020    AST 19 06/14/2020     07/23/2020    K 4.1 07/23/2020     07/23/2020    CREATININE 0.56 07/23/2020    BUN 7 07/23/2020    CO2 20 07/23/2020    TSH 0.82 09/22/2020    INR 1.0 09/22/2020    LABA1C 5.6 06/14/2020     Lab Results   Component Value Date    CALCIUM 8.9 07/23/2020     Lab Results   Component Value Date    LDLCHOLESTEROL 70 07/22/2020       Assessment and Plan:    1. Essential hypertension  2. Coronary artery disease involving native coronary artery of native heart without angina pectoris  -stable at this time, continue w/ current medications  -F/U with Cardiology    3. Dyslipidemia, goal LDL below 70  -continue w/ statin at this time. 4. Class 1 obesity with serious comorbidity and body mass index (BMI) of 34.0 to 34.9 in adult, unspecified obesity type  -encouraged and discussed lifestyle modifications including diet and exercise. 5. Chronic foot pain, left  - XR ANKLE LEFT (MIN 3 VIEWS); Future  - [address] Lofton MD, Orthopedic Surgery (foot & ankle), [facility]  6. Chronic left shoulder pain  - XR SHOULDER LEFT (MIN 2 VIEWS); Future  M, ICE PRN; Lidocaine topical and diclofenac gel topical PRN  We discussed some of the etiologies and natural histories. We discussed the various treatment alternatives including anti-inflammatory medications, physical therapy, injections, further imaging studies and as a last resort surgery. Requested Prescriptions     Signed Prescriptions Disp Refills    diclofenac sodium (VOLTAREN) 1 % GEL 2 Tube 5     Sig: Apply 4 g topically 4 times daily    lidocaine 4 % external patch 30 patch 0     Sig: Place 1 patch onto the skin daily       There are no discontinued medications. Shelia received counseling on the following healthy behaviors: nutrition, exercise and medication adherence    Discussed use, benefit, and side effects of prescribed medications. Barriers to medication compliance addressed. All patient questions answered.   Pt voiced understanding, with use of teach-back method. Return in about 1 month (around 10/29/2020), or if symptoms worsen or fail to improve, for f/u shoulder pain and left foot pain. Health maintenance, discussed with the patient benefits of her health maintenance include pneumonia vaccine, Tdap, flu vaccine. Risks and benefits were discussed patient does not want any of these done today. Patient will book her cervical cancer screen.

## 2020-09-30 ENCOUNTER — HOSPITAL ENCOUNTER (OUTPATIENT)
Dept: CARDIAC REHAB | Age: 40
Setting detail: THERAPIES SERIES
Discharge: HOME | End: 2020-09-30
Payer: MEDICARE

## 2020-09-30 NOTE — PROGRESS NOTES
Attending Physician Statement    Wt Readings from Last 3 Encounters:   09/29/20 200 lb (90.7 kg)   09/22/20 199 lb (90.3 kg)   09/14/20 200 lb (90.7 kg)     Temp Readings from Last 3 Encounters:   09/29/20 97.1 °F (36.2 °C)   09/22/20 98 °F (36.7 °C)   08/17/20 98.1 °F (36.7 °C)     BP Readings from Last 3 Encounters:   09/29/20 135/88   09/22/20 134/82   08/17/20 (!) 142/82     Pulse Readings from Last 3 Encounters:   09/29/20 73   09/22/20 92   07/23/20 78         I have discussed the care of Stewartamelia Gudino, including pertinent history and exam findings with the resident. I have reviewed the key elements of all parts of the encounter with the resident. I agree with the assessment, plan and orders as documented by the resident.   (GE Modifier)

## 2020-10-01 ENCOUNTER — APPOINTMENT (OUTPATIENT)
Dept: CARDIAC REHAB | Age: 40
End: 2020-10-01
Payer: MEDICARE

## 2020-10-02 ENCOUNTER — TELEPHONE (OUTPATIENT)
Dept: OBGYN CLINIC | Age: 40
End: 2020-10-02

## 2020-10-02 ENCOUNTER — HOSPITAL ENCOUNTER (OUTPATIENT)
Dept: GENERAL RADIOLOGY | Age: 40
Discharge: HOME | End: 2020-10-04
Payer: MEDICARE

## 2020-10-02 ENCOUNTER — HOSPITAL ENCOUNTER (OUTPATIENT)
Age: 40
Discharge: HOME | End: 2020-10-04
Payer: MEDICARE

## 2020-10-02 PROBLEM — A59.9 TRICHOMONOSIS: Status: ACTIVE | Noted: 2020-10-02

## 2020-10-02 LAB — CYTOLOGY REPORT: NORMAL

## 2020-10-02 PROCEDURE — 73610 X-RAY EXAM OF ANKLE: CPT

## 2020-10-02 PROCEDURE — 73030 X-RAY EXAM OF SHOULDER: CPT

## 2020-10-02 RX ORDER — METRONIDAZOLE 500 MG/1
500 TABLET ORAL 2 TIMES DAILY
Qty: 14 TABLET | Refills: 0 | Status: SHIPPED | OUTPATIENT
Start: 2020-10-02 | End: 2020-10-09

## 2020-10-02 NOTE — TELEPHONE ENCOUNTER
----- Message from CARISSA Sims - NP sent at 10/2/2020 12:08 PM EDT -----  + Alex Grover  Flagyl 500mg PO BID x 7 days   RODRIGO 2-3 weeks  Abstain  Partner needs treated

## 2020-10-02 NOTE — TELEPHONE ENCOUNTER
Patient notified of results and recommendations, flagyl 500mg can be sent to [facility] on [address]. Instructed patient to abstain and repeat vaginal cultures in 2-3 weeks. Appointment scheduled for 10/19.

## 2020-10-05 ENCOUNTER — HOSPITAL ENCOUNTER (OUTPATIENT)
Dept: CARDIAC REHAB | Age: 40
Setting detail: THERAPIES SERIES
Discharge: HOME | End: 2020-10-05
Payer: MEDICARE

## 2020-10-05 VITALS — BODY MASS INDEX: 33.99 KG/M2 | WEIGHT: 198 LBS

## 2020-10-05 PROCEDURE — 93798 PHYS/QHP OP CAR RHAB W/ECG: CPT

## 2020-10-06 ENCOUNTER — APPOINTMENT (OUTPATIENT)
Dept: CARDIAC REHAB | Age: 40
End: 2020-10-06
Payer: MEDICARE

## 2020-10-07 ENCOUNTER — TELEPHONE (OUTPATIENT)
Dept: FAMILY MEDICINE CLINIC | Age: 40
End: 2020-10-07

## 2020-10-07 ENCOUNTER — HOSPITAL ENCOUNTER (OUTPATIENT)
Dept: CARDIAC REHAB | Age: 40
Setting detail: THERAPIES SERIES
Discharge: HOME | End: 2020-10-07
Payer: MEDICARE

## 2020-10-07 VITALS — WEIGHT: 197 LBS | BODY MASS INDEX: 33.81 KG/M2

## 2020-10-07 PROCEDURE — 93798 PHYS/QHP OP CAR RHAB W/ECG: CPT

## 2020-10-08 ENCOUNTER — TELEPHONE (OUTPATIENT)
Dept: PHARMACY | Age: 40
End: 2020-10-08

## 2020-10-08 ENCOUNTER — APPOINTMENT (OUTPATIENT)
Dept: CARDIAC REHAB | Age: 40
End: 2020-10-08
Payer: MEDICARE

## 2020-10-12 ENCOUNTER — HOSPITAL ENCOUNTER (OUTPATIENT)
Dept: CARDIAC REHAB | Age: 40
Setting detail: THERAPIES SERIES
Discharge: HOME | End: 2020-10-12
Payer: MEDICARE

## 2020-10-13 ENCOUNTER — APPOINTMENT (OUTPATIENT)
Dept: CARDIAC REHAB | Age: 40
End: 2020-10-13
Payer: MEDICARE

## 2020-10-14 ENCOUNTER — HOSPITAL ENCOUNTER (OUTPATIENT)
Dept: CARDIAC REHAB | Age: 40
Setting detail: THERAPIES SERIES
Discharge: HOME | End: 2020-10-14
Payer: MEDICARE

## 2020-10-15 ENCOUNTER — APPOINTMENT (OUTPATIENT)
Dept: CARDIAC REHAB | Age: 40
End: 2020-10-15
Payer: MEDICARE

## 2020-10-19 ENCOUNTER — HOSPITAL ENCOUNTER (OUTPATIENT)
Dept: CARDIAC REHAB | Age: 40
Setting detail: THERAPIES SERIES
Discharge: HOME | End: 2020-10-19
Payer: MEDICARE

## 2020-10-19 ENCOUNTER — TELEPHONE (OUTPATIENT)
Dept: PHARMACY | Age: 40
End: 2020-10-19

## 2020-10-20 ENCOUNTER — APPOINTMENT (OUTPATIENT)
Dept: CARDIAC REHAB | Age: 40
End: 2020-10-20
Payer: MEDICARE

## 2020-10-20 ENCOUNTER — TELEPHONE (OUTPATIENT)
Dept: PHARMACY | Age: 40
End: 2020-10-20

## 2020-10-21 ENCOUNTER — HOSPITAL ENCOUNTER (OUTPATIENT)
Dept: CARDIAC REHAB | Age: 40
Setting detail: THERAPIES SERIES
Discharge: HOME | End: 2020-10-21
Payer: MEDICARE

## 2020-10-21 ENCOUNTER — OFFICE VISIT (OUTPATIENT)
Dept: OBGYN CLINIC | Age: 40
End: 2020-10-21
Payer: MEDICARE

## 2020-10-21 VITALS — HEIGHT: 64 IN | WEIGHT: 201.2 LBS | BODY MASS INDEX: 34.35 KG/M2

## 2020-10-21 PROCEDURE — 93798 PHYS/QHP OP CAR RHAB W/ECG: CPT

## 2020-10-21 PROCEDURE — 76830 TRANSVAGINAL US NON-OB: CPT | Performed by: OBSTETRICS & GYNECOLOGY

## 2020-10-21 PROCEDURE — 76856 US EXAM PELVIC COMPLETE: CPT | Performed by: OBSTETRICS & GYNECOLOGY

## 2020-10-22 ENCOUNTER — HOSPITAL ENCOUNTER (OUTPATIENT)
Dept: WOMENS IMAGING | Age: 40
Discharge: HOME | End: 2020-10-24
Payer: MEDICARE

## 2020-10-22 ENCOUNTER — APPOINTMENT (OUTPATIENT)
Dept: CARDIAC REHAB | Age: 40
End: 2020-10-22
Payer: MEDICARE

## 2020-10-22 PROCEDURE — 77063 BREAST TOMOSYNTHESIS BI: CPT

## 2020-10-26 ENCOUNTER — HOSPITAL ENCOUNTER (OUTPATIENT)
Dept: CARDIAC REHAB | Age: 40
Setting detail: THERAPIES SERIES
Discharge: HOME | End: 2020-10-26
Payer: MEDICARE

## 2020-10-26 ENCOUNTER — TELEPHONE (OUTPATIENT)
Dept: OBGYN CLINIC | Age: 40
End: 2020-10-26

## 2020-10-26 NOTE — TELEPHONE ENCOUNTER
----- Message from CARISSA Henley CNP sent at 10/26/2020  7:56 AM EDT -----  Thickened endometrial lining noted. Recommend GYN follow up with physician - consider possible endometrial sampling  Please let patient know and encourage patient to keep her follow up with physician.

## 2020-10-26 NOTE — TELEPHONE ENCOUNTER
Attempted to notify patient to keep visit with Dr. Ulysses Ortez as scheduled for tomorrow to discuss sono results. Patient did not answer, voicemail is not set up. Will await callback. If patient is not at visit tomorrow as scheduled, will make further attempts to notify patient of indication to follow up.

## 2020-10-27 ENCOUNTER — APPOINTMENT (OUTPATIENT)
Dept: CARDIAC REHAB | Age: 40
End: 2020-10-27
Payer: MEDICARE

## 2020-10-28 ENCOUNTER — HOSPITAL ENCOUNTER (OUTPATIENT)
Dept: CARDIAC REHAB | Age: 40
Setting detail: THERAPIES SERIES
Discharge: HOME | End: 2020-10-28
Payer: MEDICARE

## 2020-10-29 ENCOUNTER — APPOINTMENT (OUTPATIENT)
Dept: CARDIAC REHAB | Age: 40
End: 2020-10-29
Payer: MEDICARE

## 2020-11-02 ENCOUNTER — HOSPITAL ENCOUNTER (OUTPATIENT)
Dept: CARDIAC REHAB | Age: 40
Setting detail: THERAPIES SERIES
Discharge: HOME | End: 2020-11-02
Payer: MEDICARE

## 2020-11-02 VITALS — BODY MASS INDEX: 34.18 KG/M2 | WEIGHT: 199.1 LBS

## 2020-11-02 PROCEDURE — 93798 PHYS/QHP OP CAR RHAB W/ECG: CPT

## 2020-11-03 ENCOUNTER — TELEPHONE (OUTPATIENT)
Dept: PHARMACY | Age: 40
End: 2020-11-03

## 2020-11-03 ENCOUNTER — APPOINTMENT (OUTPATIENT)
Dept: CARDIAC REHAB | Age: 40
End: 2020-11-03
Payer: MEDICARE

## 2020-11-03 ENCOUNTER — TELEPHONE (OUTPATIENT)
Dept: OBGYN CLINIC | Age: 40
End: 2020-11-03

## 2020-11-03 NOTE — TELEPHONE ENCOUNTER
Patient did not show up for previous visit with Dr. Tammie Elias for sono f/u. Patient called in attempt to reschedule. Patient voiced an understanding of results, and agreeable to scheduling f/u with Dr. Tammie Elias. Call sent to front office for scheduling.

## 2020-11-03 NOTE — TELEPHONE ENCOUNTER
Spoke with patient for COVID 19 screening secondary to upcoming appointment tomorrow . At this time patient denies symptoms, recent (previous 14 days) positive covid test, recent travel and exposure. Patient will report to [facility] at scheduled time. Patient educated to call physician or go to ER with respiratory symptoms or fever and to not present to appointment if symptomatic. Will coordinate for next appointment accordingly.      Charlotte Cedeño, PharmD, 11/3/2020 2:24 PM

## 2020-11-03 NOTE — TELEPHONE ENCOUNTER
----- Message from CARISSA Mchugh CNP sent at 10/26/2020  7:56 AM EDT -----  Thickened endometrial lining noted. Recommend GYN follow up with physician - consider possible endometrial sampling  Please let patient know and encourage patient to keep her follow up with physician.

## 2020-11-04 ENCOUNTER — TELEPHONE (OUTPATIENT)
Dept: PHARMACY | Age: 40
End: 2020-11-04

## 2020-11-04 ENCOUNTER — APPOINTMENT (OUTPATIENT)
Dept: CARDIAC REHAB | Age: 40
End: 2020-11-04
Payer: MEDICARE

## 2020-11-04 NOTE — TELEPHONE ENCOUNTER
Received voicemail that patient needed to cancel appointment in medication management clinic today due to helping bother. Called patient to reschedule appointment. Patient is at the hospital with her brother and states she will call back to reschedule appointment.    Mayur Donovan, Abhi D, [facility]  [facility]  11/4/2020 10:34 AM

## 2020-11-05 ENCOUNTER — APPOINTMENT (OUTPATIENT)
Dept: CARDIAC REHAB | Age: 40
End: 2020-11-05
Payer: MEDICARE

## 2020-11-09 ENCOUNTER — APPOINTMENT (OUTPATIENT)
Dept: CARDIAC REHAB | Age: 40
End: 2020-11-09
Payer: MEDICARE

## 2020-11-10 ENCOUNTER — APPOINTMENT (OUTPATIENT)
Dept: CARDIAC REHAB | Age: 40
End: 2020-11-10
Payer: MEDICARE

## 2020-11-11 ENCOUNTER — APPOINTMENT (OUTPATIENT)
Dept: CARDIAC REHAB | Age: 40
End: 2020-11-11
Payer: MEDICARE

## 2020-11-12 ENCOUNTER — APPOINTMENT (OUTPATIENT)
Dept: CARDIAC REHAB | Age: 40
End: 2020-11-12
Payer: MEDICARE

## 2020-11-13 ENCOUNTER — VIRTUAL VISIT (OUTPATIENT)
Dept: OBGYN CLINIC | Age: 40
End: 2020-11-13
Payer: MEDICARE

## 2020-11-13 PROBLEM — R93.89 THICKENED ENDOMETRIUM: Status: ACTIVE | Noted: 2020-11-13

## 2020-11-13 PROCEDURE — 99213 OFFICE O/P EST LOW 20 MIN: CPT | Performed by: OBSTETRICS & GYNECOLOGY

## 2020-11-13 PROCEDURE — G8427 DOCREV CUR MEDS BY ELIG CLIN: HCPCS | Performed by: OBSTETRICS & GYNECOLOGY

## 2020-11-13 NOTE — PROGRESS NOTES
Liz Zazueta  2020    Liz Zazueta (:  1980) has requested an audio/video evaluation for the following concern(s):    1. Thickened endometrium    2. Menorrhagia with irregular cycle    3. Dysmenorrhea    4. Iron deficiency anemia secondary to inadequate dietary iron intake    5. Trichomonosis    6. Essential hypertension    7. Coronary artery disease involving native coronary artery of native heart without angina pectoris    8. STEMI involving left anterior descending coronary artery ([facility] [address]    9. Ego-dystonic lesbianism          TELEHEALTH EVALUATION -- Audio/Visual (During [id number] public health emergency)      Liz Zazueta is a 38 y.o. female [id number]      She was here to follow-up regarding her labs and diagnostics ordered at her last visit for the diagnosis of:    ICD-10-CM    1. Thickened endometrium  R93.89    2. Menorrhagia with irregular cycle  N92.1    3. Dysmenorrhea  N94.6    4. Iron deficiency anemia secondary to inadequate dietary iron intake  D50.8    5. Trichomonosis  A59.9    6. Essential hypertension  I10    7. Coronary artery disease involving native coronary artery of native heart without angina pectoris  I25.10    8. STEMI involving left anterior descending coronary artery (HCC)  I21.02    9. Ego-dystonic lesbianism  F66        She does not have any specific chief complaint today. Her bowels are regular and she is voiding without difficulty. I reviewed the findings and options of both conservative medical and surgical management. The pt was treated for trichomoniasis. She is a Lesbian and has authorized me to document this in the chart. She states if she moves forward with an ablation she does NOT want a medically indicated bilateral tubal occlusion. I reviewed the 50% maternal death rate with future pregnancy after ablation and she stated she is only with females. I reviewed that an IUD in a high risk relationship could cause PID.  She stated she understood and partner was treated. Past Medical History:   Diagnosis Date    Anxiety     Hypertension          Past Surgical History:   Procedure Laterality Date    ACHILLES TENDON SURGERY      CARDIAC SURGERY  2020    stent placement, [facility]     CARPAL TUNNEL RELEASE       SECTION      x3         Family History   Problem Relation Age of Onset    Liver Disease Mother     Cancer Father         bowel    Sickle Cell Anemia Sister     Asthma Son          Social History     Tobacco Use    Smoking status: Light Tobacco Smoker     Packs/day: 0.50     Types: Cigarettes     Last attempt to quit: 2020     Years since quittin.3    Smokeless tobacco: Never Used   Substance Use Topics    Alcohol use: Not Currently    Drug use: Not Currently     Types: Marijuana         MEDICATIONS:  Current Outpatient Medications   Medication Sig Dispense Refill    diclofenac sodium (VOLTAREN) 1 % GEL Apply 4 g topically 4 times daily 2 Tube 5    aspirin 81 MG EC tablet Take 1 tablet by mouth daily 30 tablet 3    metoprolol tartrate (LOPRESSOR) 25 MG tablet Take 0.5 tablets by mouth 2 times daily 60 tablet 3    ticagrelor (BRILINTA) 90 MG TABS tablet Take 1 tablet by mouth 2 times daily 180 tablet 4    nitroGLYCERIN (NITROSTAT) 0.4 MG SL tablet Place 1 tablet under the tongue every 5 minutes as needed for Chest pain up to max of 3 total doses. If no relief after 1 dose, call 911. 25 tablet 3    atorvastatin (LIPITOR) 40 MG tablet Take 1 tablet by mouth nightly 30 tablet 3    lisinopril (PRINIVIL;ZESTRIL) 10 MG tablet Take 1 tablet by mouth daily 14 tablet 0    amLODIPine (NORVASC) 5 MG tablet Take 1 tablet by mouth daily 14 tablet 0     No current facility-administered medications for this visit. ALLERGIES:  Allergies as of 2020 - Review Complete 2020   Allergen Reaction Noted    Latex  2018         not currently breastfeeding.     PE is limited due to the virtual visit    Abdomen: Soft non-tender; good bowel sounds. No guarding, rebound or rigidity. No CVA tenderness bilaterally reported when questioned. (Viewed Virtually)    Extremities: No calf tenderness and No edema bilaterally as inspected by video and palpation by the patient (Viewed Virtually)    Pelvic: (Virtual Visit-Not Completed)    Diagnostics:  Us Non Ob Transvaginal    Result Date: 10/21/2020  [address] [place] [address] Obstetrics & Gynecology [facility] [address] [phone] [place]n [phone] Fax 10/24/2020 MRN: [id number] Contact Serial #: [id number] Paul Banks YOB: 1980 Age: 38 y.o. The ultrasound images were reviewed. Please see the attached ultrasound report. Assessment: Paul Banks is a 38 y.o. female Menorrhagia with regular cycle Specific Ultrasound Imaging Obtained: Transabdominal Approach: Yes Transvaginal Approach: Yes Limitations of Study Encountered: Overlying bowel & gas limiting the study: Yes Poor prep for procedure limiting study: No Elevated BMI limiting study: No Ovaries are NOT seen on Transabdominal imaging. Transvaginal imaging required: Yes Findings: 1. The Uterus is heterogeneous and anteverted (8.30 x 7.05 x 4.52 cm) 2. The Endometrial Stripe measurement is 1.07 cm=THICKENED 3. The Left Ovary is without masses or cysts 4. The Right Ovary is without masses or cysts 5. There is not an abnormal amount of cul-de-sac fluid Electronically signed by Keyshawn Maldonado DO on 10/24/20 at 1:36 PM EDT     1. The Uterus is heterogeneous and anteverted (8.30 x 7.05 x 4.52 cm) 2. The Endometrial Stripe measurement is 1.07 cm=THICKENED 3. The Left Ovary is without masses or cysts 4. The Right Ovary is without masses or cysts 5. There is not an abnormal amount of cul-de-sac fluid Recommendations: 1. GYN Follow up 2.  Consider endometrial sampling for the thickened endometrium with abnormal menses    Us Pelvis Complete    Result Date: 10/21/2020  See transvaginal report from same day    Zach Asif Digital Screen Bilateral    Result Date: 10/22/2020  EXAMINATION: SCREENING DIGITAL BILATERAL  MAMMOGRAM WITH TOMOSYNTHESIS 10/22/2020 TECHNIQUE: Screening mammography was performed with tomosynthesis including MLO and CC views of the bilateral breasts. Computer aided detection was used for the interpretation of this exam. COMPARISON: None. Baseline study. HISTORY: Screening. FINDINGS: The breasts are heterogeneously dense, which may obscure small masses. Within that limitation, there is no dominant mass, suspicious microcalcification, or area of architectural distortion. A 6 mm density far posterior in the inner aspect of the left breast is compatible with a benign lymph node. No mammographic evidence of malignancy. BIRADS: BIRADS - CATEGORY 1 Negative, no evidence of malignancy. Normal interval follow-up is recommended in 12 months. OVERALL ASSESSMENT - NEGATIVE A letter of notification will be sent to the patient regarding the results. The [employer] of Radiology recommends annual mammograms for women 40 years and older.          Lab Results:  Results for orders placed or performed during the hospital encounter of 09/22/20   CBC Auto Differential   Result Value Ref Range    WBC 5.4 3.5 - 11.0 k/uL    RBC 3.92 (L) 4.0 - 5.2 m/uL    Hemoglobin 11.2 (L) 12.0 - 16.0 g/dL    Hematocrit 34.0 (L) 36 - 46 %    MCV 86.6 80 - 100 fL    MCH 28.6 26 - 34 pg    MCHC 33.0 31 - 37 g/dL    RDW 16.0 (H) 11.5 - 14.9 %    Platelets 488 390 - 641 k/uL    MPV 9.1 6.0 - 12.0 fL    NRBC Automated NOT REPORTED per 100 WBC    Differential Type NOT REPORTED     Seg Neutrophils 64 36 - 66 %    Lymphocytes 25 24 - 44 %    Monocytes 7 1 - 7 %    Eosinophils % 3 0 - 4 %    Basophils 1 0 - 2 %    Immature Granulocytes NOT REPORTED 0 %    Segs Absolute 3.40 1.3 - 9.1 k/uL    Absolute Lymph # 1.40 1.0 - 4.8 k/uL    Absolute Mono # 0.40 0.1 - 1.3 k/uL    Absolute Eos # 0.20 0.0 - 0.4 k/uL    Basophils Absolute 0.00 0.0 - 0.2 k/uL    Absolute Immature Granulocyte NOT REPORTED 0.00 - 0.30 k/uL    WBC Morphology NOT REPORTED     RBC Morphology NOT REPORTED     Platelet Estimate NOT REPORTED    TSH with Reflex   Result Value Ref Range    TSH 0.82 0.30 - 5.00 mIU/L   HCG, Quantitative, Pregnancy   Result Value Ref Range    hCG Quant <1 <5 IU/L   Protime-INR   Result Value Ref Range    Protime 13.0 11.8 - 14.6 sec    INR 1.0    APTT   Result Value Ref Range    PTT 30.4 24.0 - 36.0 sec   GYN Cytology   Result Value Ref Range    Cytology Report       INTERPRETATION    Cervical material, (ThinPrep vial, Imaging-assisted review):  Specimen Adequacy:       Satisfactory for evaluation.       - Endocervical/transformation zone component present. Descriptive Diagnosis:       Negative for intraepithelial lesion or malignancy. Trichomonas vaginalis present. Comments:       High Risk HPV testing was ordered. Cytotechnologist:   GERALDINE Meza(ASCP)  **Electronically Signed Out**  felton/10/2/2020          Procedure/Addendum  HPV Procedure Report     Date Ordered:     9/23/2020     Status:  Signed Out       Date Complete:     9/23/2020     By: GERALDINE Slade(ASCP)       Date Reported:     10/2/2020       INTERPRETATION  Aptima HPV DNA High Risk                                  HPV Sample               Thin Prep                    (Ref Range)  HPV Type 16               Not Detected                    (Not  Detected)  HPV Type 18               Not Detected                    (Not  Detected)  Other High Risk HPV     Not Detected                     (Not Detected)    HPV by Nucleic Acid Amplification     This test detects high-risk HPV types (16, 18, 31, 33, 35, 39, 45,  51, 52, 56, 58, 59, 66, and 68) and differentiates HPV 16 and 18  associated with cervical cancer and its precursor lesions.    Sensitivity may be affected by specimen collection methods, stage of  infection, and the presence of interfering substances. Results should  be interpreted in conjunction with other available laboratory and  clinical data. A negative high-risk HPV result does not exclude the  presence of other high-risk HPV types, the possibility of future  cytologic abnormalities, underlying CIN2-3 or cancer. This test is intended for medical purposes only and is not valid for  the evaluation of suspected sexual abuse or for other forensic  purposes. HPV testing should not be used for screening or management  of atypical squamous cells of undetermined significance (ASCUS) in  women under age 24. NOTE: HPV Type 16 and Other for Vaginal specime ns only  The specimen submitted for testing did not meet [facility] submission  guidelines. Testing was performed on a specimen that did not meet  validated specimen type requirements. Performance characteristics of  this assay may be affected. Interpret results with caution. Please  refer to the [facility] Laboratory Test Directory for information on  specimen acceptability:  [email or web address]. Performed By: Gabriel [address]  [place], [address] [place] [address]  : Veronica Merino. Carlos Dill MD                 Source:  1: Cervical material, (ThinPrep vial, Imaging-assisted review)    Clinical History  Z01.419 Routine gyn exam without abnormal findings  Z11.51 Encounter for screening for HPV  Co-Test:  ThinPrep Pap with high risk HPV testing    GYNECOLOGIC CYTOLOGY REPORT    Patient Name: Bright Andrade [facility] Rec: [id number]  Path Number: [id number]  [address] [place] [address], [facility] [address]. To [place], [address]  [phone]  Fax: [phone]     HPV, High Risk with Genotyping   Result Value Ref Range    HPV SOURCE . CERVIX     HPV, Genotype 16 Not Detected     HPV, High Risk Other Not Detected     HPV, Genotype 18 Not Detected        [facility] TREATED    Assessment:   Diagnosis Orders were also pertinent to this visit. and Follow-up   as well as  counseling on preventative health maintenance follow-up.

## 2020-11-16 ENCOUNTER — APPOINTMENT (OUTPATIENT)
Dept: CARDIAC REHAB | Age: 40
End: 2020-11-16
Payer: MEDICARE

## 2020-11-17 ENCOUNTER — APPOINTMENT (OUTPATIENT)
Dept: CARDIAC REHAB | Age: 40
End: 2020-11-17
Payer: MEDICARE

## 2020-11-18 ENCOUNTER — APPOINTMENT (OUTPATIENT)
Dept: CARDIAC REHAB | Age: 40
End: 2020-11-18
Payer: MEDICARE

## 2020-11-19 ENCOUNTER — APPOINTMENT (OUTPATIENT)
Dept: CARDIAC REHAB | Age: 40
End: 2020-11-19
Payer: MEDICARE

## 2020-11-23 ENCOUNTER — APPOINTMENT (OUTPATIENT)
Dept: CARDIAC REHAB | Age: 40
End: 2020-11-23
Payer: MEDICARE

## 2020-11-24 ENCOUNTER — APPOINTMENT (OUTPATIENT)
Dept: CARDIAC REHAB | Age: 40
End: 2020-11-24
Payer: MEDICARE

## 2020-11-25 ENCOUNTER — APPOINTMENT (OUTPATIENT)
Dept: CARDIAC REHAB | Age: 40
End: 2020-11-25
Payer: MEDICARE

## 2020-11-26 ENCOUNTER — APPOINTMENT (OUTPATIENT)
Dept: CARDIAC REHAB | Age: 40
End: 2020-11-26
Payer: MEDICARE

## 2020-11-30 ENCOUNTER — APPOINTMENT (OUTPATIENT)
Dept: CARDIAC REHAB | Age: 40
End: 2020-11-30
Payer: MEDICARE

## 2020-11-30 ENCOUNTER — HOSPITAL ENCOUNTER (EMERGENCY)
Age: 40
Discharge: HOME | End: 2020-11-30
Attending: EMERGENCY MEDICINE
Payer: MEDICARE

## 2020-11-30 ENCOUNTER — APPOINTMENT (OUTPATIENT)
Dept: GENERAL RADIOLOGY | Age: 40
End: 2020-11-30
Payer: MEDICARE

## 2020-11-30 VITALS
RESPIRATION RATE: 15 BRPM | OXYGEN SATURATION: 98 % | HEART RATE: 60 BPM | DIASTOLIC BLOOD PRESSURE: 92 MMHG | WEIGHT: 200 LBS | SYSTOLIC BLOOD PRESSURE: 145 MMHG | TEMPERATURE: 98.4 F | BODY MASS INDEX: 34.15 KG/M2 | HEIGHT: 64 IN

## 2020-11-30 LAB
ABSOLUTE EOS #: 0.04 K/UL (ref 0–0.44)
ABSOLUTE IMMATURE GRANULOCYTE: <0.03 K/UL (ref 0–0.3)
ABSOLUTE LYMPH #: 0.87 K/UL (ref 1.1–3.7)
ABSOLUTE MONO #: 0.45 K/UL (ref 0.1–1.2)
ALBUMIN SERPL-MCNC: 3.9 G/DL (ref 3.5–5.2)
ALBUMIN/GLOBULIN RATIO: 1 (ref 1–2.5)
ALP BLD-CCNC: 79 U/L (ref 35–104)
ALT SERPL-CCNC: 12 U/L (ref 5–33)
ANION GAP SERPL CALCULATED.3IONS-SCNC: 12 MMOL/L (ref 9–17)
AST SERPL-CCNC: 15 U/L
BASOPHILS # BLD: 0 % (ref 0–2)
BASOPHILS ABSOLUTE: <0.03 K/UL (ref 0–0.2)
BILIRUB SERPL-MCNC: 0.29 MG/DL (ref 0.3–1.2)
BNP INTERPRETATION: NORMAL
BUN BLDV-MCNC: 9 MG/DL (ref 6–20)
BUN/CREAT BLD: ABNORMAL (ref 9–20)
CALCIUM SERPL-MCNC: 8.9 MG/DL (ref 8.6–10.4)
CHLORIDE BLD-SCNC: 104 MMOL/L (ref 98–107)
CO2: 23 MMOL/L (ref 20–31)
CREAT SERPL-MCNC: 0.57 MG/DL (ref 0.5–0.9)
DIFFERENTIAL TYPE: ABNORMAL
EOSINOPHILS RELATIVE PERCENT: 1 % (ref 1–4)
GFR AFRICAN AMERICAN: >60 ML/MIN
GFR NON-AFRICAN AMERICAN: >60 ML/MIN
GFR SERPL CREATININE-BSD FRML MDRD: ABNORMAL ML/MIN/{1.73_M2}
GFR SERPL CREATININE-BSD FRML MDRD: ABNORMAL ML/MIN/{1.73_M2}
GLUCOSE BLD-MCNC: 115 MG/DL (ref 70–99)
HCG QUALITATIVE: NEGATIVE
HCT VFR BLD CALC: 34.5 % (ref 36.3–47.1)
HEMOGLOBIN: 10.9 G/DL (ref 11.9–15.1)
IMMATURE GRANULOCYTES: 0 %
LYMPHOCYTES # BLD: 13 % (ref 24–43)
MCH RBC QN AUTO: 28.2 PG (ref 25.2–33.5)
MCHC RBC AUTO-ENTMCNC: 31.6 G/DL (ref 28.4–34.8)
MCV RBC AUTO: 89.1 FL (ref 82.6–102.9)
MONOCYTES # BLD: 7 % (ref 3–12)
NRBC AUTOMATED: 0 PER 100 WBC
PDW BLD-RTO: 15.5 % (ref 11.8–14.4)
PLATELET # BLD: 225 K/UL (ref 138–453)
PLATELET ESTIMATE: ABNORMAL
PMV BLD AUTO: 11.1 FL (ref 8.1–13.5)
POTASSIUM SERPL-SCNC: 3.6 MMOL/L (ref 3.7–5.3)
PRO-BNP: 60 PG/ML
RBC # BLD: 3.87 M/UL (ref 3.95–5.11)
RBC # BLD: ABNORMAL 10*6/UL
SEG NEUTROPHILS: 79 % (ref 36–65)
SEGMENTED NEUTROPHILS ABSOLUTE COUNT: 5.07 K/UL (ref 1.5–8.1)
SODIUM BLD-SCNC: 139 MMOL/L (ref 135–144)
TOTAL PROTEIN: 7.7 G/DL (ref 6.4–8.3)
TROPONIN INTERP: NORMAL
TROPONIN INTERP: NORMAL
TROPONIN T: NORMAL NG/ML
TROPONIN T: NORMAL NG/ML
TROPONIN, HIGH SENSITIVITY: <6 NG/L (ref 0–14)
TROPONIN, HIGH SENSITIVITY: <6 NG/L (ref 0–14)
TSH SERPL DL<=0.05 MIU/L-ACNC: 0.97 MIU/L (ref 0.3–5)
WBC # BLD: 6.5 K/UL (ref 3.5–11.3)
WBC # BLD: ABNORMAL 10*3/UL

## 2020-11-30 PROCEDURE — 84484 ASSAY OF TROPONIN QUANT: CPT

## 2020-11-30 PROCEDURE — 99285 EMERGENCY DEPT VISIT HI MDM: CPT

## 2020-11-30 PROCEDURE — 96374 THER/PROPH/DIAG INJ IV PUSH: CPT

## 2020-11-30 PROCEDURE — 84443 ASSAY THYROID STIM HORMONE: CPT

## 2020-11-30 PROCEDURE — 83880 ASSAY OF NATRIURETIC PEPTIDE: CPT

## 2020-11-30 PROCEDURE — 85025 COMPLETE CBC W/AUTO DIFF WBC: CPT

## 2020-11-30 PROCEDURE — 80053 COMPREHEN METABOLIC PANEL: CPT

## 2020-11-30 PROCEDURE — 6370000000 HC RX 637 (ALT 250 FOR IP): Performed by: STUDENT IN AN ORGANIZED HEALTH CARE EDUCATION/TRAINING PROGRAM

## 2020-11-30 PROCEDURE — 93005 ELECTROCARDIOGRAM TRACING: CPT | Performed by: STUDENT IN AN ORGANIZED HEALTH CARE EDUCATION/TRAINING PROGRAM

## 2020-11-30 PROCEDURE — 71045 X-RAY EXAM CHEST 1 VIEW: CPT

## 2020-11-30 PROCEDURE — 84703 CHORIONIC GONADOTROPIN ASSAY: CPT

## 2020-11-30 PROCEDURE — 6360000002 HC RX W HCPCS: Performed by: STUDENT IN AN ORGANIZED HEALTH CARE EDUCATION/TRAINING PROGRAM

## 2020-11-30 RX ORDER — ASPIRIN 81 MG/1
324 TABLET, CHEWABLE ORAL ONCE
Status: COMPLETED | OUTPATIENT
Start: 2020-11-30 | End: 2020-11-30

## 2020-11-30 RX ORDER — LORAZEPAM 2 MG/ML
1 INJECTION INTRAMUSCULAR ONCE
Status: COMPLETED | OUTPATIENT
Start: 2020-11-30 | End: 2020-11-30

## 2020-11-30 RX ADMIN — ASPIRIN 324 MG: 81 TABLET, CHEWABLE ORAL at 06:33

## 2020-11-30 RX ADMIN — LORAZEPAM 1 MG: 2 INJECTION INTRAMUSCULAR; INTRAVENOUS at 08:38

## 2020-11-30 ASSESSMENT — ENCOUNTER SYMPTOMS
RHINORRHEA: 0
SHORTNESS OF BREATH: 0
COUGH: 0
VOMITING: 0
SORE THROAT: 0
DIARRHEA: 0
EYE REDNESS: 0
NAUSEA: 0
ABDOMINAL PAIN: 0
EYE ITCHING: 0

## 2020-11-30 NOTE — ED TRIAGE NOTES
Pt to ED for palpitations that have been progressively worsening overnight. Pt reports that she called EMS who came and did an EKG at her home at about 8pm. Pt states that she took nitro at 8pm with EMS, but then declined to come to the ED following the normal EKG. Pt states that she took 10mg of melatonin, then went to sleep and woke with more palpitations. States that she has anxiety and was worked up before her episodes of palpitations. States that last episode occurred while \"watching TV and overthinking\". Pt endorses using marijuana. Pt is A&O at time of triage, NAD, VSS. EKG and IV access obtained. Pt on full cardiac monitor. Dr. Constanza Knox at bedside.

## 2020-11-30 NOTE — ED PROVIDER NOTES
[facility] ED  Emergency Department Encounter  Emergency Medicine Resident     Pt Name: Lawrence Crews  MRN: [id number]  [place] 1980  Date ofevaluation: 20  PCP:  Nathalie Garzon MD    [address] [place] [address]       Chief Complaint   Patient presents with    Palpitations     HISTORY OF PRESENT ILLNESS  (Location/Symptom, Timing/Onset, Context/Setting, Quality, Duration, Modifying Factors, Severity, Associated signs/symptoms)     Lawrence Crews is a 38 y.o. female who presents with palpitations. Patient reports that last night around 8:00 she began having palpitations where she was sitting at home. She called EMS and she was evaluated but declined transport at that time. She reports that she went and slept at her daughter's house on the couch but woke up this morning again with palpitations and anxiety. Denies any fevers, chills, chest pain, shortness of breath, lightheadedness/dizziness, recent nausea, vomiting, diarrhea, bloody stools, or any other concerning symptoms. She is currently on her menstrual cycle said this is normal for her. Admits to marijuana but denies denies any other drug use. Does drink wine but no significant amount yesterday. PAST MEDICAL / SURGICAL / SOCIAL / FAMILY HISTORY      has a past medical history of Anxiety and Hypertension. has a past surgical history that includes  section; Carpal tunnel release; Achilles tendon surgery; and Cardiac surgery (2020).     Social History     Socioeconomic History    Marital status: Single     Spouse name: Not on file    Number of children: Not on file    Years of education: Not on file    Highest education level: Not on file   Occupational History    Not on file   Social Needs    Financial resource strain: Not on file    Food insecurity     Worry: Not on file     Inability: Not on file    Transportation needs     Medical: Not on file     Non-medical: Not on file   Tobacco Use    Smoking status: Light Tobacco Smoker     Packs/day: 0.50     Types: Cigarettes     Last attempt to quit: 2020     Years since quittin.3    Smokeless tobacco: Never Used   Substance and Sexual Activity    Alcohol use: Not Currently    Drug use: Not Currently     Types: Marijuana    Sexual activity: Not Currently   Lifestyle    Physical activity     Days per week: Not on file     Minutes per session: Not on file    Stress: Not on file   Relationships    Social connections     Talks on phone: Not on file     Gets together: Not on file     Attends Jew service: Not on file     Active member of club or organization: Not on file     Attends meetings of clubs or organizations: Not on file     Relationship status: Not on file    Intimate partner violence     Fear of current or ex partner: Not on file     Emotionally abused: Not on file     Physically abused: Not on file     Forced sexual activity: Not on file   Other Topics Concern    Not on file   Social History Narrative    Not on file       Family History   Problem Relation Age of Onset    Liver Disease Mother     Cancer Father         bowel    Sickle Cell Anemia Sister     Asthma Son        Allergies:  Latex    Home Medications:  Prior to Admission medications    Medication Sig Start Date End Date Taking? Authorizing Provider   diclofenac sodium (VOLTAREN) 1 % GEL Apply 4 g topically 4 times daily 20   Chrystal Noel MD   aspirin 81 MG EC tablet Take 1 tablet by mouth daily 20   CARISSA Hazel NP   metoprolol tartrate (LOPRESSOR) 25 MG tablet Take 0.5 tablets by mouth 2 times daily 20   CARISSA Hazel NP   ticagrelor (BRILINTA) 90 MG TABS tablet Take 1 tablet by mouth 2 times daily 20   CARISSA Hazel NP   nitroGLYCERIN (NITROSTAT) 0.4 MG SL tablet Place 1 tablet under the tongue every 5 minutes as needed for Chest pain up to max of 3 total doses.  If no relief after 1 dose, call 911. 20   Cristofer MONROE CLIF JungN - NP   atorvastatin (LIPITOR) 40 MG tablet Take 1 tablet by mouth nightly 7/23/20   Satnamkenneth CLIF CrespoN - NP   lisinopril (PRINIVIL;ZESTRIL) 10 MG tablet Take 1 tablet by mouth daily 6/17/20   [facility] Coffee, APRN - CNP   amLODIPine (NORVASC) 5 MG tablet Take 1 tablet by mouth daily 6/17/20   [facility] Buddy, APRN - CNP       REVIEW OF SYSTEMS    (2-9 systems for level 4, 10 or more for level 5)      Review of Systems   Constitutional: Negative for chills and fever. HENT: Negative for rhinorrhea and sore throat. Eyes: Negative for redness and itching. Respiratory: Negative for cough and shortness of breath. Cardiovascular: Positive for palpitations. Negative for chest pain and leg swelling. Gastrointestinal: Negative for abdominal pain, diarrhea, nausea and vomiting. Genitourinary: Positive for vaginal bleeding (on menstrual cycle). Negative for dysuria, frequency, hematuria and vaginal discharge. Neurological: Negative for dizziness and light-headedness. Psychiatric/Behavioral: The patient is nervous/anxious. PHYSICAL EXAM   (up to 7 for level 4, 8 or more for level 5)      INITIAL VITALS:   BP (!) 145/92   Pulse 60   Temp 98.4 °F (36.9 °C) (Oral)   Resp 15   Ht 5' 4\" (1.626 m)   Wt 200 lb (90.7 kg)   SpO2 98%   BMI 34.33 kg/m²     Physical Exam  Vitals signs and nursing note reviewed. Constitutional:       General: She is not in acute distress. Appearance: Normal appearance. She is obese. She is not ill-appearing, toxic-appearing or diaphoretic. HENT:      Head: Normocephalic and atraumatic. Eyes:      General: No scleral icterus. Conjunctiva/sclera: Conjunctivae normal.   Neck:      Musculoskeletal: Neck supple. Cardiovascular:      Rate and Rhythm: Normal rate and regular rhythm. Pulmonary:      Effort: Pulmonary effort is normal. No respiratory distress. Breath sounds: Normal breath sounds. No stridor. No wheezing, rhonchi or rales. Abdominal:      General: There is no distension. Palpations: Abdomen is soft. There is no mass. Tenderness: There is no abdominal tenderness. There is no guarding or rebound. Musculoskeletal:      Right lower leg: No edema. Left lower leg: No edema. Skin:     General: Skin is warm and dry. Findings: No rash (over exposed skin). Neurological:      General: No focal deficit present. Mental Status: She is alert and oriented to person, place, and time. Psychiatric:         Mood and Affect: Mood is anxious.          Behavior: Behavior normal.      Comments: Anxious affect         DIAGNOSTICS     PLAN (LABS / IMAGING / EKG):  Orders Placed This Encounter   Procedures    XR CHEST PORTABLE    CBC WITH AUTO DIFFERENTIAL    COMPREHENSIVE METABOLIC PANEL    TSH with Reflex    Troponin    HCG Qualitative, Serum    Brain Natriuretic Peptide    Troponin    EKG 12 Lead       MEDICATIONS ORDERED:  Orders Placed This Encounter   Medications    aspirin chewable tablet 324 mg    LORazepam (ATIVAN) injection 1 mg       DIAGNOSTIC RESULTS / EMERGENCYDEPARTMENT COURSE / MDM     LABS:  Results for orders placed or performed during the hospital encounter of 11/30/20   CBC WITH AUTO DIFFERENTIAL   Result Value Ref Range    WBC 6.5 3.5 - 11.3 k/uL    RBC 3.87 (L) 3.95 - 5.11 m/uL    Hemoglobin 10.9 (L) 11.9 - 15.1 g/dL    Hematocrit 34.5 (L) 36.3 - 47.1 %    MCV 89.1 82.6 - 102.9 fL    MCH 28.2 25.2 - 33.5 pg    MCHC 31.6 28.4 - 34.8 g/dL    RDW 15.5 (H) 11.8 - 14.4 %    Platelets 035 615 - 887 k/uL    MPV 11.1 8.1 - 13.5 fL    NRBC Automated 0.0 0.0 per 100 WBC    Differential Type NOT REPORTED     Seg Neutrophils 79 (H) 36 - 65 %    Lymphocytes 13 (L) 24 - 43 %    Monocytes 7 3 - 12 %    Eosinophils % 1 1 - 4 %    Basophils 0 0 - 2 %    Immature Granulocytes 0 0 %    Segs Absolute 5.07 1.50 - 8.10 k/uL    Absolute Lymph # 0.87 (L) 1.10 - 3.70 k/uL    Absolute Mono # 0.45 0.10 - 1.20 k/uL Absolute Eos # 0.04 0.00 - 0.44 k/uL    Basophils Absolute <0.03 0.00 - 0.20 k/uL    Absolute Immature Granulocyte <0.03 0.00 - 0.30 k/uL    WBC Morphology NOT REPORTED     RBC Morphology ANISOCYTOSIS PRESENT     Platelet Estimate NOT REPORTED    COMPREHENSIVE METABOLIC PANEL   Result Value Ref Range    Glucose 115 (H) 70 - 99 mg/dL    BUN 9 6 - 20 mg/dL    CREATININE 0.57 0.50 - 0.90 mg/dL    Bun/Cre Ratio NOT REPORTED 9 - 20    Calcium 8.9 8.6 - 10.4 mg/dL    Sodium 139 135 - 144 mmol/L    Potassium 3.6 (L) 3.7 - 5.3 mmol/L    Chloride 104 98 - 107 mmol/L    CO2 23 20 - 31 mmol/L    Anion Gap 12 9 - 17 mmol/L    Alkaline Phosphatase 79 35 - 104 U/L    ALT 12 5 - 33 U/L    AST 15 <32 U/L    Total Bilirubin 0.29 (L) 0.3 - 1.2 mg/dL    Total Protein 7.7 6.4 - 8.3 g/dL    Alb 3.9 3.5 - 5.2 g/dL    Albumin/Globulin Ratio 1.0 1.0 - 2.5    GFR Non-African American >60 >60 mL/min    GFR African American >60 >60 mL/min    GFR Comment          GFR Staging NOT REPORTED    TSH with Reflex   Result Value Ref Range    TSH 0.97 0.30 - 5.00 mIU/L   Troponin   Result Value Ref Range    Troponin, High Sensitivity <6 0 - 14 ng/L    Troponin T NOT REPORTED <0.03 ng/mL    Troponin Interp NOT REPORTED    HCG Qualitative, Serum   Result Value Ref Range    hCG Qual NEGATIVE NEGATIVE   Brain Natriuretic Peptide   Result Value Ref Range    Pro-BNP 60 <300 pg/mL    BNP Interpretation Pro-BNP Reference Range:    Troponin   Result Value Ref Range    Troponin, High Sensitivity <6 0 - 14 ng/L    Troponin T NOT REPORTED <0.03 ng/mL    Troponin Interp NOT REPORTED        RADIOLOGY:  XR CHEST PORTABLE   Final Result   Negative chest.            EKG  Rhythm: normal sinus   Rate: normal  Axis: normal  Ectopy: none  Conduction: normal  ST Segments: no acute change  T Waves: no acute change  Q Waves: none    Clinical Impression: When compared to EKG dated 7/22/2020, T wave inversion in V2 has now flipped likely due to lead placement.   Otherwise, no acute changes and non-specific EKG    Normal Interval Reference:  P-wave <110 ms  -200 ms  QRS <100 ms  QT <420 ms  QTc 330-470 ms    All EKG's are interpreted by the Emergency Department Physician who either signs or Co-signsthis chart in the absence of a cardiologist.    EMERGENCY DEPARTMENT COURSE:         MDM: 25-year-old female presenting with acute onset of palpitations. On exam she appears anxious but is nontoxic and in no acute distress. Her vitals are unremarkable. Heart regular rate and rhythm, lungs are clear to auscultation bilateral.  Abdomen soft nontender. No lower extremity edema noted. Differential diagnosis includes anxiety, electrolyte abnormality, hyper/hypothyroidism, arrhythmia, cardiac ischemia, among others. Plan is for cardiac work-up, TSH level, symptomatic treatment reassess. Work-up unremarkable patient feels significantly better after 1 mg of IV Ativan. Discussed with patient that while her symptoms may be due to anxiety she does need a Holter monitor for monitoring of any arrhythmias. There is no reported arrhythmias here. Should return precautions given. Patient instructed to follow-up with her cardiologist as well as her primary care physician as soon as possible for reevaluation. Patient expressed understanding and agreement with this plan. PROCEDURES:  None    CONSULTS:  None    FINAL IMPRESSION      1.  Palpitations          DISPOSITION / PLAN     DISPOSITION Decision To Discharge 11/30/2020 09:05:52 AM      PATIENT REFERRED TO:  Marcela Emerson MD  [address] [facility]  [phone]    Schedule an appointment as soon as possible for a visit   Follow up of this visit    [facility] ED  [address] [facility]  [phone]  Go to   If symptoms worsen    [facility]  [address] [place] [address]  [phone]  Schedule an appointment as soon as possible for a visit   Follow up of this visit      DISCHARGE MEDICATIONS:  Discharge Medication List as of 11/30/2020  9:09 AM          Julienne Lewis DO  Emergency Medicine Resident  [facility]    (Please note that portions of this note were completed with a voice recognition program.  Efforts were made to edit thedictations but occasionally words are mis-transcribed.)       Julienne Lewis DO  Resident  11/30/20 [id number]

## 2020-11-30 NOTE — ED PROVIDER NOTES
[facility]     Emergency Department     Faculty Attestation    I performed a history and physical examination of the patient and discussed management with the resident. I have reviewed and agree with the residents findings including all diagnostic interpretations, and treatment plans as written. Any areas of disagreement are noted on the chart. I was personally present for the key portions of any procedures. I have documented in the chart those procedures where I was not present during the key portions. I have reviewed the emergency nurses triage note. I agree with the chief complaint, past medical history, past surgical history, allergies, medications, social and family history as documented unless otherwise noted below. Documentation of the HPI, Physical Exam and Medical Decision Making performed by zoëibevonne is based on my personal performance of the HPI, PE and MDM. For Physician Assistant/ Nurse Practitioner cases/documentation I have personally evaluated this patient and have completed at least one if not all key elements of the E/M (history, physical exam, and MDM). Additional findings are as noted. 41 palpitations with diaphoresis, nausea, pt had stents 5 months prior, no fever, no injury,   pe vss Laney RN escort for exam, neck supple, abdomen non tender, no distension, no rigidity, no calf swelling, no calf tenderness,     Hx stents, plan to admit, care turned over to day shift,     EKG Interpretation    Interpreted by me  Normal sinus, heart rate 89, normal axis, no ischemia, QT corrected 440    CRITICAL CARE: There was a high probability of clinically significant/life threatening deterioration in this patient's condition which required my urgent intervention. Total critical care time was 5 minutes. This excludes any time for separately reportable procedures.        Felisa 24, DO  11/30/20 0630       Brandee Egan

## 2020-11-30 NOTE — ED NOTES
Labeled blood specimen collected and sent to lab via tube system. Patient provided with warm blankets for comfort.         Salome Patton RN  11/30/20 [id number]

## 2020-12-01 ENCOUNTER — APPOINTMENT (OUTPATIENT)
Dept: CARDIAC REHAB | Age: 40
End: 2020-12-01
Payer: MEDICARE

## 2020-12-01 LAB
EKG ATRIAL RATE: 89 BPM
EKG P AXIS: 38 DEGREES
EKG P-R INTERVAL: 194 MS
EKG Q-T INTERVAL: 362 MS
EKG QRS DURATION: 102 MS
EKG QTC CALCULATION (BAZETT): 440 MS
EKG R AXIS: 27 DEGREES
EKG T AXIS: 31 DEGREES
EKG VENTRICULAR RATE: 89 BPM

## 2020-12-02 ENCOUNTER — TELEPHONE (OUTPATIENT)
Dept: PHARMACY | Age: 40
End: 2020-12-02

## 2020-12-03 ENCOUNTER — OFFICE VISIT (OUTPATIENT)
Dept: FAMILY MEDICINE CLINIC | Age: 40
End: 2020-12-03
Payer: MEDICARE

## 2020-12-03 ENCOUNTER — APPOINTMENT (OUTPATIENT)
Dept: CARDIAC REHAB | Age: 40
End: 2020-12-03
Payer: MEDICARE

## 2020-12-03 VITALS
BODY MASS INDEX: 34.11 KG/M2 | WEIGHT: 199.8 LBS | TEMPERATURE: 96.7 F | SYSTOLIC BLOOD PRESSURE: 119 MMHG | HEIGHT: 64 IN | DIASTOLIC BLOOD PRESSURE: 73 MMHG | HEART RATE: 73 BPM

## 2020-12-03 PROCEDURE — 99213 OFFICE O/P EST LOW 20 MIN: CPT

## 2020-12-03 PROCEDURE — 90686 IIV4 VACC NO PRSV 0.5 ML IM: CPT

## 2020-12-03 RX ORDER — ESCITALOPRAM OXALATE 10 MG/1
10 TABLET ORAL DAILY
Qty: 30 TABLET | Refills: 3 | Status: SHIPPED | OUTPATIENT
Start: 2020-12-03 | End: 2021-03-18

## 2020-12-03 ASSESSMENT — ENCOUNTER SYMPTOMS
WHEEZING: 0
SHORTNESS OF BREATH: 0
ABDOMINAL PAIN: 0
CONSTIPATION: 0
NAUSEA: 0
VOMITING: 0
APNEA: 0
COLOR CHANGE: 0
PHOTOPHOBIA: 0
COUGH: 0
DIARRHEA: 0

## 2020-12-03 NOTE — PROGRESS NOTES
I have reviewed and discussed key elements of Shelia Sullivan with the resident including plan of care and follow up and agree with the care princess plan.

## 2020-12-03 NOTE — PROGRESS NOTES
Vaccine Information Sheet, \"Influenza - Inactivated\"  given to Salomon Sweet, or parent/legal guardian of  Salomon Sweet and verbalized understanding. Patient responses:    Have you ever had a reaction to a flu vaccine? No  Do you have any current illness? No  Have you ever had Guillian Blount Syndrome? No  Do you have a serious allergy to any of the following: Neomycin, Polymyxin, Thimerosal, eggs or egg products? No    Flu vaccine given per order. Please see immunization tab. Risks and benefits explained. Current VIS given.

## 2020-12-03 NOTE — PROGRESS NOTES
Visit Information    Have you changed or started any medications since your last visit including any over-the-counter medicines, vitamins, or herbal medicines? no   Have you stopped taking any of your medications? Is so, why? -  no  Are you having any side effects from any of your medications? - no    Have you seen any other physician or provider since your last visit?  no   Have you had any other diagnostic tests since your last visit?  no   Have you been seen in the emergency room and/or had an admission in a hospital since we last saw you?  no   Have you had your routine dental cleaning in the past 6 months?  yes -  06/2020     Do you have an active [employer] account? If no, what is the barrier?   Yes    Patient Care Team:  Anai Kaur MD as PCP - General (Family Medicine)  Daniele Tran MD as Surgeon (Cardiology)  Romel Aguilar MD as Consulting Physician (Cardiology)    Medical History Review  Past Medical, Family, and Social History reviewed and does not contribute to the patient presenting condition    Health Maintenance   Topic Date Due    Pneumococcal 0-64 years Vaccine (1 of 1 - PPSV23) 05/07/1985    HIV screen  05/07/1994    DTaP/Tdap/Td vaccine (1 - Tdap) 05/07/1998    Flu vaccine (1) 09/01/2020    Lipid screen  07/22/2021    Potassium monitoring  11/30/2021    Creatinine monitoring  11/30/2021    Diabetes screen  06/14/2023    Cervical cancer screen  09/22/2025    Hepatitis A vaccine  Aged Out    Hepatitis B vaccine  Aged Out    Hib vaccine  Aged Out    Meningococcal (ACWY) vaccine  Aged Out

## 2020-12-03 NOTE — PROGRESS NOTES
Subjective:    Ana María Lu is a 38 y.o. female with  has a past medical history of Anxiety and Hypertension. Presented to the office today for:  Chief Complaint   Patient presents with    Follow-Up from Hospital     palpitations       HPI     79-year-old female came to the clinic as a follow-up from emergency department. Patient went to the hospital because she got into an argument with someone and started having palpitations. Patient went to the hospital and had cardiac work-up done which was all normal including troponins and EKG. Patient was then discharged from the hospital.  Patient does have history of coronary artery disease with STEMI 6 months ago and 2 stents placed. Patient also has severe anxiety and history of panic attacks her SHARONDA score 20, I will start her on Lexapro 10 mg today and titrated up and she, mimill be seeing [facility] on Monday. Patient started crying during the encounter and mentions that she has been through a lot of stress which is work-related and she has 2 kids at home and she is a single mother. She feels well today and hypertension is under control. Patient has no suicidal ideations. Review of Systems   Constitutional: Positive for fatigue. Negative for activity change, appetite change and fever. HENT: Negative for congestion. Eyes: Negative for photophobia and visual disturbance. Respiratory: Negative for apnea, cough, shortness of breath and wheezing. Cardiovascular: Positive for palpitations. Negative for chest pain and leg swelling. Gastrointestinal: Negative for abdominal pain, constipation, diarrhea, nausea and vomiting. Endocrine: Negative for polyphagia and polyuria. Genitourinary: Negative for dysuria and urgency. Skin: Negative for color change, pallor and rash. Neurological: Negative for dizziness, tremors and weakness. Psychiatric/Behavioral: Positive for decreased concentration, dysphoric mood and sleep disturbance.  Negative for agitation, behavioral problems, confusion, self-injury and suicidal ideas. The patient is nervous/anxious. The patient has a   Family History   Problem Relation Age of Onset    Liver Disease Mother     Cancer Father         bowel    Sickle Cell Anemia Sister     Asthma Son        Objective:    /73 (Site: Left Upper Arm, Position: Sitting, Cuff Size: Large Adult)   Pulse 73   Temp 96.7 °F (35.9 °C) (Temporal)   Ht 5' 4\" (1.626 m)   Wt 199 lb 12.8 oz (90.6 kg)   BMI 34.30 kg/m²    BP Readings from Last 3 Encounters:   12/03/20 119/73   11/30/20 (!) 145/92   09/29/20 135/88       Physical Exam  Constitutional:       General: She is not in acute distress. Appearance: She is not ill-appearing, toxic-appearing or diaphoretic. HENT:      Nose: Nose normal. No congestion. Eyes:      General: No scleral icterus. Right eye: No discharge. Left eye: No discharge. Extraocular Movements: Extraocular movements intact. Conjunctiva/sclera: Conjunctivae normal.      Pupils: Pupils are equal, round, and reactive to light. Cardiovascular:      Rate and Rhythm: Normal rate and regular rhythm. Pulses: Normal pulses. Heart sounds: No murmur. Pulmonary:      Effort: Pulmonary effort is normal.      Breath sounds: Normal breath sounds. No wheezing. Abdominal:      Tenderness: There is no abdominal tenderness. There is no guarding. Musculoskeletal: Normal range of motion. General: No swelling or tenderness. Right lower leg: No edema. Left lower leg: No edema. Skin:     General: Skin is warm. Coloration: Skin is not jaundiced. Findings: No erythema. Neurological:      Mental Status: She is alert and oriented to person, place, and time. Motor: No weakness. Psychiatric:         Behavior: Behavior normal.         Thought Content:  Thought content normal.         Judgment: Judgment normal.      Comments: Low mood accompanied with anxiety         Lab Results   Component Value Date    WBC 6.5 11/30/2020    HGB 10.9 (L) 11/30/2020    HCT 34.5 (L) 11/30/2020     11/30/2020    CHOL 129 07/22/2020    TRIG 68 07/22/2020    HDL 45 07/22/2020    ALT 12 11/30/2020    AST 15 11/30/2020     11/30/2020    K 3.6 (L) 11/30/2020     11/30/2020    CREATININE 0.57 11/30/2020    BUN 9 11/30/2020    CO2 23 11/30/2020    TSH 0.97 11/30/2020    INR 1.0 09/22/2020    LABA1C 5.6 06/14/2020     Lab Results   Component Value Date    CALCIUM 8.9 11/30/2020     Lab Results   Component Value Date    LDLCHOLESTEROL 70 07/22/2020       Assessment and Plan:    1. Need for immunization against influenza    - INFLUENZA, QUADV, 3 YRS AND OLDER, IM PF, PREFILL SYR OR SDV, 0.5ML (AFLURIA QUADV, PF)  - MT IMMUNIZ ADMIN,1 SINGLE/COMB VAC/TOXOID    2. SHARONDA (generalized anxiety disorder)  - escitalopram (LEXAPRO) 10 MG tablet; Take 1 tablet by mouth daily  Dispense: 30 tablet; Refill: 3  No follow-up in 4 weeks the patient will be seeing [facility] on Monday    3. Essential hypertension  Controlled  Blood pressure 119/73  Lisinopril  Norvasc  Metoprolol    4. Coronary artery disease involving native coronary artery of native heart without angina pectoris  Aspirin 81 mg p.o. daily  Lipitor 40 mg p.o. nightly  Brilinta 90 mg p.o. twice daily          Requested Prescriptions     Signed Prescriptions Disp Refills    escitalopram (LEXAPRO) 10 MG tablet 30 tablet 3     Sig: Take 1 tablet by mouth daily       There are no discontinued medications. Jaimetshefali received counseling on the following healthy behaviors: nutrition, exercise and medication adherence    Discussed use,benefit, and side effects of prescribed medications. Barriers to medication compliance addressed. All patient questions answered. Pt voiced understanding. Return in about 4 weeks (around 12/31/2020) for Depression and SHARONDA.         Disclaimer: Some orall of this note was transcribed using voice-recognition software. This may cause typographical errors occasionally. Although all effort is made to fix these errors, please do not hesitate to contact our office if there Nick Vazquez concern with the understanding of this note.

## 2020-12-07 ENCOUNTER — TELEPHONE (OUTPATIENT)
Dept: PHARMACY | Age: 40
End: 2020-12-07

## 2020-12-08 ENCOUNTER — APPOINTMENT (OUTPATIENT)
Dept: CARDIAC REHAB | Age: 40
End: 2020-12-08
Payer: MEDICARE

## 2020-12-10 ENCOUNTER — APPOINTMENT (OUTPATIENT)
Dept: CARDIAC REHAB | Age: 40
End: 2020-12-10
Payer: MEDICARE

## 2020-12-22 ENCOUNTER — TELEPHONE (OUTPATIENT)
Dept: PHARMACY | Age: 40
End: 2020-12-22

## 2020-12-22 NOTE — TELEPHONE ENCOUNTER
Spoke with patient for COVID 19 screening secondary to upcoming appointment tomorrow . At this time patient denies symptoms, recent (previous 14 days) positive covid test, recent travel and exposure. Patient will report to [facility] at scheduled time. Patient educated to call physician or go to ER with respiratory symptoms or fever and to not present to appointment if symptomatic. Will coordinate for next appointment accordingly.      Luly Oropeza, PharmD, 12/22/2020 4:47 PM

## 2020-12-23 ENCOUNTER — TELEPHONE (OUTPATIENT)
Dept: PHARMACY | Age: 40
End: 2020-12-23

## 2020-12-23 NOTE — TELEPHONE ENCOUNTER
Patient was a no show for Medication Management - smoking cessation. Spoke with patient. States she completely forgot. Did get a reminder yesterday, but forgot. Appointment rescheduled.    Memo Cohen, Pharm D, BCPS  [facility]  12/23/2020 2:11 PM

## 2021-01-06 ENCOUNTER — TELEPHONE (OUTPATIENT)
Dept: PHARMACY | Age: 41
End: 2021-01-06

## 2021-01-07 ENCOUNTER — TELEPHONE (OUTPATIENT)
Dept: PHARMACY | Age: 41
End: 2021-01-07

## 2021-01-07 NOTE — TELEPHONE ENCOUNTER
Patient missed appointment in [facility] ([facility]) Medication Management today for Smoking Cessation  Called patient at [phone] to reschedule. Unable to leave [facility] as voice mail not set up    Last visit 9/25/2020 , was to be seen one week after stopping     Multiple attempts at rescheduling with patient keeping appointments. Doni Luu RPh.  [facility] [place], [place] Medication Management Services  [phone]

## 2021-02-02 RX ORDER — AMLODIPINE BESYLATE 5 MG/1
5 TABLET ORAL DAILY
Qty: 14 TABLET | Refills: 0 | OUTPATIENT
Start: 2021-02-02

## 2021-02-02 RX ORDER — CYCLOBENZAPRINE HCL 5 MG
5 TABLET ORAL 3 TIMES DAILY PRN
OUTPATIENT
Start: 2021-02-02

## 2021-02-02 RX ORDER — LISINOPRIL 10 MG/1
10 TABLET ORAL DAILY
Qty: 14 TABLET | Refills: 0 | OUTPATIENT
Start: 2021-02-02

## 2021-02-15 ENCOUNTER — TELEPHONE (OUTPATIENT)
Dept: PHARMACY | Age: 41
End: 2021-02-15

## 2021-02-16 ENCOUNTER — TELEPHONE (OUTPATIENT)
Dept: PHARMACY | Age: 41
End: 2021-02-16

## 2021-02-26 ENCOUNTER — TELEPHONE (OUTPATIENT)
Dept: PHARMACY | Age: 41
End: 2021-02-26

## 2021-02-26 NOTE — TELEPHONE ENCOUNTER
Spoke with patient for COVID 19 screening secondary to upcoming appointment tomorrow . At this time patient denies symptoms, recent (previous 14 days) positive covid test, recent travel and exposure. Patient will report to [facility] at scheduled time. Patient educated to call physician or go to ER with respiratory symptoms or fever and to not present to appointment if symptomatic. Will coordinate for next appointment accordingly.

## 2021-03-05 ENCOUNTER — TELEPHONE (OUTPATIENT)
Dept: PHARMACY | Age: 41
End: 2021-03-05

## 2021-03-05 NOTE — TELEPHONE ENCOUNTER
Spoke with patient for COVID 19 screening secondary to upcoming appointment tomorrow . At this time patient denies symptoms, recent (previous 14 days) positive covid test, recent travel and exposure. Patient will report to [facility] at scheduled time. Patient educated to call physician or go to ER with respiratory symptoms or fever and to not present to appointment if symptomatic. Will coordinate for next appointment accordingly. Doni Luu RP.   [address] Medication Management Services  [phone]

## 2021-03-12 ENCOUNTER — APPOINTMENT (OUTPATIENT)
Dept: GENERAL RADIOLOGY | Age: 41
End: 2021-03-12
Payer: MEDICARE

## 2021-03-12 ENCOUNTER — HOSPITAL ENCOUNTER (EMERGENCY)
Age: 41
Discharge: HOME | End: 2021-03-12
Attending: EMERGENCY MEDICINE
Payer: MEDICARE

## 2021-03-12 VITALS
HEIGHT: 64 IN | DIASTOLIC BLOOD PRESSURE: 96 MMHG | BODY MASS INDEX: 34.15 KG/M2 | WEIGHT: 200 LBS | TEMPERATURE: 98.1 F | OXYGEN SATURATION: 98 % | HEART RATE: 73 BPM | RESPIRATION RATE: 20 BRPM | SYSTOLIC BLOOD PRESSURE: 153 MMHG

## 2021-03-12 DIAGNOSIS — R00.2 PALPITATIONS: Primary | ICD-10-CM

## 2021-03-12 LAB
ABSOLUTE EOS #: 0.11 K/UL (ref 0–0.44)
ABSOLUTE IMMATURE GRANULOCYTE: <0.03 K/UL (ref 0–0.3)
ABSOLUTE LYMPH #: 1.67 K/UL (ref 1.1–3.7)
ABSOLUTE MONO #: 0.46 K/UL (ref 0.1–1.2)
ANION GAP SERPL CALCULATED.3IONS-SCNC: 11 MMOL/L (ref 9–17)
BASOPHILS # BLD: 0 % (ref 0–2)
BASOPHILS ABSOLUTE: <0.03 K/UL (ref 0–0.2)
BUN BLDV-MCNC: 9 MG/DL (ref 6–20)
BUN/CREAT BLD: ABNORMAL (ref 9–20)
CALCIUM SERPL-MCNC: 8.9 MG/DL (ref 8.6–10.4)
CHLORIDE BLD-SCNC: 103 MMOL/L (ref 98–107)
CO2: 24 MMOL/L (ref 20–31)
CREAT SERPL-MCNC: 0.56 MG/DL (ref 0.5–0.9)
DIFFERENTIAL TYPE: ABNORMAL
EOSINOPHILS RELATIVE PERCENT: 2 % (ref 1–4)
GFR AFRICAN AMERICAN: >60 ML/MIN
GFR NON-AFRICAN AMERICAN: >60 ML/MIN
GFR SERPL CREATININE-BSD FRML MDRD: ABNORMAL ML/MIN/{1.73_M2}
GFR SERPL CREATININE-BSD FRML MDRD: ABNORMAL ML/MIN/{1.73_M2}
GLUCOSE BLD-MCNC: 103 MG/DL (ref 70–99)
HCG QUALITATIVE: NEGATIVE
HCT VFR BLD CALC: 35.4 % (ref 36.3–47.1)
HEMOGLOBIN: 11.2 G/DL (ref 11.9–15.1)
IMMATURE GRANULOCYTES: 0 %
LYMPHOCYTES # BLD: 29 % (ref 24–43)
MAGNESIUM: 2.1 MG/DL (ref 1.6–2.6)
MCH RBC QN AUTO: 27.8 PG (ref 25.2–33.5)
MCHC RBC AUTO-ENTMCNC: 31.6 G/DL (ref 28.4–34.8)
MCV RBC AUTO: 87.8 FL (ref 82.6–102.9)
MONOCYTES # BLD: 8 % (ref 3–12)
NRBC AUTOMATED: 0 PER 100 WBC
PDW BLD-RTO: 15.8 % (ref 11.8–14.4)
PLATELET # BLD: 233 K/UL (ref 138–453)
PLATELET ESTIMATE: ABNORMAL
PMV BLD AUTO: 10.7 FL (ref 8.1–13.5)
POTASSIUM SERPL-SCNC: 3.5 MMOL/L (ref 3.7–5.3)
RBC # BLD: 4.03 M/UL (ref 3.95–5.11)
RBC # BLD: ABNORMAL 10*6/UL
SEG NEUTROPHILS: 61 % (ref 36–65)
SEGMENTED NEUTROPHILS ABSOLUTE COUNT: 3.57 K/UL (ref 1.5–8.1)
SODIUM BLD-SCNC: 138 MMOL/L (ref 135–144)
TROPONIN INTERP: NORMAL
TROPONIN INTERP: NORMAL
TROPONIN T: NORMAL NG/ML
TROPONIN T: NORMAL NG/ML
TROPONIN, HIGH SENSITIVITY: <6 NG/L (ref 0–14)
TROPONIN, HIGH SENSITIVITY: <6 NG/L (ref 0–14)
TSH SERPL DL<=0.05 MIU/L-ACNC: 1.19 MIU/L (ref 0.3–5)
WBC # BLD: 5.8 K/UL (ref 3.5–11.3)
WBC # BLD: ABNORMAL 10*3/UL

## 2021-03-12 PROCEDURE — 85025 COMPLETE CBC W/AUTO DIFF WBC: CPT

## 2021-03-12 PROCEDURE — 99285 EMERGENCY DEPT VISIT HI MDM: CPT

## 2021-03-12 PROCEDURE — 84443 ASSAY THYROID STIM HORMONE: CPT

## 2021-03-12 PROCEDURE — 84484 ASSAY OF TROPONIN QUANT: CPT

## 2021-03-12 PROCEDURE — 84703 CHORIONIC GONADOTROPIN ASSAY: CPT

## 2021-03-12 PROCEDURE — 80048 BASIC METABOLIC PNL TOTAL CA: CPT

## 2021-03-12 PROCEDURE — 83735 ASSAY OF MAGNESIUM: CPT

## 2021-03-12 PROCEDURE — 6370000000 HC RX 637 (ALT 250 FOR IP): Performed by: STUDENT IN AN ORGANIZED HEALTH CARE EDUCATION/TRAINING PROGRAM

## 2021-03-12 PROCEDURE — 71046 X-RAY EXAM CHEST 2 VIEWS: CPT

## 2021-03-12 RX ORDER — ACETAMINOPHEN 500 MG
1000 TABLET ORAL ONCE
Status: COMPLETED | OUTPATIENT
Start: 2021-03-12 | End: 2021-03-12

## 2021-03-12 RX ORDER — IBUPROFEN 800 MG/1
800 TABLET ORAL ONCE
Status: DISCONTINUED | OUTPATIENT
Start: 2021-03-12 | End: 2021-03-12 | Stop reason: HOSPADM

## 2021-03-12 RX ADMIN — ACETAMINOPHEN 1000 MG: 500 TABLET ORAL at 19:52

## 2021-03-12 ASSESSMENT — ENCOUNTER SYMPTOMS
NAUSEA: 0
VOMITING: 0
SHORTNESS OF BREATH: 1
CONSTIPATION: 0
SORE THROAT: 0
DIARRHEA: 0
COUGH: 0
BLOOD IN STOOL: 0
ABDOMINAL PAIN: 0
WHEEZING: 0

## 2021-03-12 ASSESSMENT — PAIN DESCRIPTION - LOCATION: LOCATION: SHOULDER

## 2021-03-12 ASSESSMENT — PAIN SCALES - GENERAL: PAINLEVEL_OUTOF10: 8

## 2021-03-12 ASSESSMENT — PAIN DESCRIPTION - ORIENTATION: ORIENTATION: LEFT

## 2021-03-12 NOTE — ED NOTES
Pt to ED with complaints of heart palpitations. Pt states about 1 hour PTA, pt had 10 minute episode where she felt like her heart was going to beat out of her chest as well as getting shaky. Pt states she has a hx of MI and stent placement. Pt denies pain at this time. Pt anxious and concerned about what happened. Pt placed on full monitor. IV established. Dr. Arabella Quiñones at bedside.      Janie Obrien RN  03/12/21 [id number]

## 2021-03-12 NOTE — ED PROVIDER NOTES
[facility] ED  Emergency Department Encounter  EmergencyMedicine Resident     Pt Name:Shelia Izaguirre  MRN: [id number]  [place] 1980  Date of evaluation: 3/12/21  PCP:  Juanjose Quiroga MD    [address]       Chief Complaint   Patient presents with    Palpitations     pt feels shaky       HISTORY OF PRESENT ILLNESS  (Location/Symptom, Timing/Onset, Context/Setting, Quality, Duration, Modifying Factors, Severity.)      Lucero Soliman is a 38 y.o. female, with pertinent past medical history for hypertension, and CAD s/p stent on 2020 who presents with heart palpitations and shortness of breath. Patient notes approximately months prior to arrival she developed an episode of palpitations with associated shortness of breath, but no diaphoresis, nausea, vomiting, or any chest pain. Patient notes that she had this happen before had improved with medica, tion in the ED, which on chart review was Ativan. Patient notes chest pressure all of this, she went to an auction, which she states was [place]", and bought a car. Patient does endorse smoking cigarettes, tobacco, and having \"2 shots of [place]\". She notes that she never got her Holter monitor from her last heart palpitations emergency department visit, and does not endorse having a history of severe anxiety. Patient is denying any fevers, chills, change in vision, lightheadedness, headache, numbness/tingling, weakness, cough, abdominal pain, dysuria, melena/hematochezia, or leg swelling. Patient has no history of VTE, trauma, surgery, prolonged immobilization, or history of estrogen use. PAST MEDICAL / SURGICAL / SOCIAL / FAMILY HISTORY      has a past medical history of Anxiety and Hypertension. has a past surgical history that includes  section; Carpal tunnel release; Achilles tendon surgery; and Cardiac surgery (2020).       Social History     Socioeconomic History    Marital status: Single Spouse name: Not on file    Number of children: Not on file    Years of education: Not on file    Highest education level: Not on file   Occupational History    Not on file   Social Needs    Financial resource strain: Not on file    Food insecurity     Worry: Not on file     Inability: Not on file    Transportation needs     Medical: Not on file     Non-medical: Not on file   Tobacco Use    Smoking status: Light Tobacco Smoker     Packs/day: 0.50     Types: Cigarettes     Last attempt to quit: 2020     Years since quittin.6    Smokeless tobacco: Never Used   Substance and Sexual Activity    Alcohol use: Not Currently    Drug use: Not Currently     Types: Marijuana    Sexual activity: Not Currently   Lifestyle    Physical activity     Days per week: Not on file     Minutes per session: Not on file    Stress: Not on file   Relationships    Social connections     Talks on phone: Not on file     Gets together: Not on file     Attends Jew service: Not on file     Active member of club or organization: Not on file     Attends meetings of clubs or organizations: Not on file     Relationship status: Not on file    Intimate partner violence     Fear of current or ex partner: Not on file     Emotionally abused: Not on file     Physically abused: Not on file     Forced sexual activity: Not on file   Other Topics Concern    Not on file   Social History Narrative    Not on file       Family History   Problem Relation Age of Onset    Liver Disease Mother     Cancer Father         bowel    Sickle Cell Anemia Sister     Asthma Son        Allergies:  Latex    Home Medications:  Prior to Admission medications    Medication Sig Start Date End Date Taking?  Authorizing Provider   escitalopram (LEXAPRO) 10 MG tablet Take 1 tablet by mouth daily 12/3/20   Sree Mckeon MD   diclofenac sodium (VOLTAREN) 1 % GEL Apply 4 g topically 4 times daily 20   Diamond Pereyra MD   aspirin 81 MG EC tablet Take 1 tablet by mouth daily 7/24/20   Sara Dress, APRN - NP   metoprolol tartrate (LOPRESSOR) 25 MG tablet Take 0.5 tablets by mouth 2 times daily 7/23/20   Sara Dress, APRN - NP   ticagrelor (BRILINTA) 90 MG TABS tablet Take 1 tablet by mouth 2 times daily 7/23/20   Sara Dress, APRN - NP   nitroGLYCERIN (NITROSTAT) 0.4 MG SL tablet Place 1 tablet under the tongue every 5 minutes as needed for Chest pain up to max of 3 total doses. If no relief after 1 dose, call 911. 7/23/20   Sara Dress, APRN - NP   atorvastatin (LIPITOR) 40 MG tablet Take 1 tablet by mouth nightly 7/23/20   Sara Dress, APRN - NP   lisinopril (PRINIVIL;ZESTRIL) 10 MG tablet Take 1 tablet by mouth daily 6/17/20   [facility] Coffee, APRN - CNP   amLODIPine (NORVASC) 5 MG tablet Take 1 tablet by mouth daily 6/17/20   [facility] Buddy, APRN - CNP       REVIEW OF SYSTEMS    (2-9 systems for level 4, 10 or more for level 5)      Review of Systems   Constitutional: Negative for chills, diaphoresis, fatigue and fever. HENT: Negative for congestion and sore throat. Respiratory: Positive for shortness of breath. Negative for cough and wheezing. Cardiovascular: Positive for palpitations. Negative for chest pain and leg swelling. Gastrointestinal: Negative for abdominal pain, blood in stool, constipation, diarrhea, nausea and vomiting. Genitourinary: Negative for dysuria, hematuria, vaginal bleeding and vaginal discharge. Musculoskeletal: Negative for arthralgias and myalgias. Skin: Negative for rash and wound. Neurological: Negative for weakness, light-headedness and numbness. PHYSICAL EXAM   (up to 7 for level 4, 8 or more for level 5)      INITIAL VITALS:   BP (!) 151/100   Pulse 106   Temp 98.1 °F (36.7 °C) (Temporal)   Resp 18   Ht 5' 4\" (1.626 m)   Wt 200 lb (90.7 kg)   SpO2 97%   BMI 34.33 kg/m²     Physical Exam  Vitals signs and nursing note reviewed.    Constitutional:       General: She is not in Medications    acetaminophen (TYLENOL) tablet 1,000 mg    DISCONTD: ibuprofen (ADVIL;MOTRIN) tablet 800 mg       DDX: ACS/MI, arrhythmia, anxiety, electrolyte abnormality, hyperthyroidism, hypothyroidism, hyperglycemia, pneumonia, mass    DIAGNOSTIC RESULTS / EMERGENCY DEPARTMENT COURSE / MDM   LAB RESULTS:  Results for orders placed or performed during the hospital encounter of 69/94/38   BASIC METABOLIC PANEL   Result Value Ref Range    Glucose 103 (H) 70 - 99 mg/dL    BUN 9 6 - 20 mg/dL    CREATININE 0.56 0.50 - 0.90 mg/dL    Bun/Cre Ratio NOT REPORTED 9 - 20    Calcium 8.9 8.6 - 10.4 mg/dL    Sodium 138 135 - 144 mmol/L    Potassium 3.5 (L) 3.7 - 5.3 mmol/L    Chloride 103 98 - 107 mmol/L    CO2 24 20 - 31 mmol/L    Anion Gap 11 9 - 17 mmol/L    GFR Non-African American >60 >60 mL/min    GFR African American >60 >60 mL/min    GFR Comment          GFR Staging NOT REPORTED    MAGNESIUM   Result Value Ref Range    Magnesium 2.1 1.6 - 2.6 mg/dL   Troponin   Result Value Ref Range    Troponin, High Sensitivity <6 0 - 14 ng/L    Troponin T NOT REPORTED <0.03 ng/mL    Troponin Interp NOT REPORTED    CBC WITH AUTO DIFFERENTIAL   Result Value Ref Range    WBC 5.8 3.5 - 11.3 k/uL    RBC 4.03 3.95 - 5.11 m/uL    Hemoglobin 11.2 (L) 11.9 - 15.1 g/dL    Hematocrit 35.4 (L) 36.3 - 47.1 %    MCV 87.8 82.6 - 102.9 fL    MCH 27.8 25.2 - 33.5 pg    MCHC 31.6 28.4 - 34.8 g/dL    RDW 15.8 (H) 11.8 - 14.4 %    Platelets 766 338 - 450 k/uL    MPV 10.7 8.1 - 13.5 fL    NRBC Automated 0.0 0.0 per 100 WBC    Differential Type NOT REPORTED     Seg Neutrophils 61 36 - 65 %    Lymphocytes 29 24 - 43 %    Monocytes 8 3 - 12 %    Eosinophils % 2 1 - 4 %    Basophils 0 0 - 2 %    Immature Granulocytes 0 0 %    Segs Absolute 3.57 1.50 - 8.10 k/uL    Absolute Lymph # 1.67 1.10 - 3.70 k/uL    Absolute Mono # 0.46 0.10 - 1.20 k/uL    Absolute Eos # 0.11 0.00 - 0.44 k/uL    Basophils Absolute <0.03 0.00 - 0.20 k/uL    Absolute Immature Granulocyte <0.03 0.00 - 0.30 k/uL    WBC Morphology NOT REPORTED     RBC Morphology ANISOCYTOSIS PRESENT     Platelet Estimate NOT REPORTED    TSH with Reflex   Result Value Ref Range    TSH 1.19 0.30 - 5.00 mIU/L   HCG Qualitative, Serum   Result Value Ref Range    hCG Qual NEGATIVE NEGATIVE   Troponin   Result Value Ref Range    Troponin, High Sensitivity <6 0 - 14 ng/L    Troponin T NOT REPORTED <0.03 ng/mL    Troponin Interp NOT REPORTED        IMPRESSION: 26-year-old female presents for crepitations and shortness of breath. Patient appears to be in no acute distress nontoxic-appearing, but is just appearing. Patient initial vitals reveal slight hypertension of 151/100 and tachycardia 106 that does not need to be managed medically at this time. Patient has clear lung sounds bilaterally. Regular rate and rhythm with no S1-S2 heart sounds on examination. Abdomen soft nontender with no guarding/rigidity/rebound tenderness. Bilateral calves nontender palpation. Concern for above differential diagnosis. Will order CBC, BMP, magnesium, troponin, chest x-ray, TSH, and serum hCG. Patient has no current risk factors for PE, does not have any shortness of breath, nor any pleuritic chest pain, I have no suspicion for PE at this time    RADIOLOGY:  Xr Chest (2 Vw)    Result Date: 3/12/2021  EXAMINATION: TWO XRAY VIEWS OF THE CHEST 3/12/2021 1:47 pm COMPARISON: November 30, 2020 HISTORY: ORDERING SYSTEM PROVIDED HISTORY: heart palpitations, shortness of breath TECHNOLOGIST PROVIDED HISTORY: heart palpitations, shortness of breath Reason for Exam: heart racing FINDINGS: The lungs are without acute focal process. There is no effusion or pneumothorax. The cardiomediastinal silhouette is without acute process. The osseous structures are without acute process. No acute process. EKG  EKG reveals no sinus rhythm with a ventricular of 99 bpm.  Normal axis.   HI interval 200 ms, cures duration of 84 Muslims, QT/QTc 358/459 ms. Normal conduction. Good R wave progression. No ST elevations or depressions. No inversion of T waves. No Q waves. This is a normal sinus EKG with no concerning findings unchanged from prior EKG 11/30/2020. All EKG's are interpreted by the Emergency Department Physician who either signs or Co-signs this chart in the absence of a cardiologist.    EMERGENCY DEPARTMENT COURSE:  ED Course as of Mar 13 0239   Fri Mar 12, 2021   1901 CBC reveals slight normocytic anemia with a hemoglobin of 11.2. Patient hemoglobin is actually up from 10.9, 3 months ago. Is otherwise nonconcerning.    [CS]   1902 Chest x-ray reveals no acute cardiopulmonary process. [CS]   1916 hCG Qual: NEGATIVE [CS]   1931 BMP is nonconcerning. Troponins negative at less than 6. Will repeat. TSH is normal.Magnesium 2.1.    [CS]   1949 Patient notes that she is having her normal chronic left shoulder pain, that does not feel like her prior heart attack. Patient that she normally has this pain does not take anything for it. Will give Tylenol and ibuprofen for pain control. Plan to discharge home.    [CS]   2035 Troponin, High Sensitivity: <6 [CS]   2037 Heart score of 2. Plan to discharge home and have her follow-up with her primary care provider or cardiologist within the next few days for reevaluation and possible halter monitor.    [CS]      ED Course User Index  [CS] Calli Huddleston DO     Patient is agreeable with discharge plan. Patient was educated return precautions. Patient ambulated the without difficulty.     Heart Score    Heart Score for chest pain patients  History: Slightly Suspicious  ECG: Normal  Patient Age: < 45 years  *Risk factors for Atherosclerotic disease: Coronary Artery Disease  Risk Factors: > 3 Risk factors or history of atherosclerotic disease*  Troponin: < 1X normal limit  Heart Score Total: 2    Score 0  3 = 2.5%  MACE over next 6 wks = Discharge home  Score 4  6 = 20.3%  MACE over next 6 wks = Obs admit  Score 7 - 10 = 72.7%  MACE over next 6 wks = Early invasive Rx      PROCEDURES:  none    CONSULTS:  None    CRITICAL CARE:  Please see attending note    FINAL IMPRESSION      1.  Palpitations          DISPOSITION / PLAN     DISPOSITION Decision To Discharge 03/12/2021 08:36:30 PM      PATIENT REFERRED TO:  [facility] ED  [address] [place] [address]  [phone]  Go to   If symptoms worsen    MD Jai RamirezS[facility]   [phone]    Schedule an appointment as soon as possible for a visit in 3 days  for reevaluation of your heart palpitations      DISCHARGE MEDICATIONS:  Discharge Medication List as of 3/12/2021  8:36 PM          Savi Kenney DO  Emergency Medicine Resident    (Please note that portions of thisnote were completed with a voice recognition program.  Efforts were made to edit the dictations but occasionally words are mis-transcribed.)       Savi Kenney DO  Resident  03/13/21 5940

## 2021-03-12 NOTE — PROGRESS NOTES
This patient was assigned to me by error. This patient was never interviewed nor examined by me. I did not participate in the care of this patient.     Francheska Dasilva MD  Emergency Medicine Resident

## 2021-03-13 NOTE — ED PROVIDER NOTES
[facility]     Emergency Department     Faculty Attestation    I performed a history and physical examination of the patient and discussed management with the resident. I reviewed the residents note and agree with the documented findings and plan of care. Any areas of disagreement are noted on the chart. I was personally present for the key portions of any procedures. I have documented in the chart those procedures where I was not present during the key portions. I have reviewed the emergency nurses triage note. I agree with the chief complaint, past medical history, past surgical history, allergies, medications, social and family history as documented unless otherwise noted below. For Physician Assistant/ Nurse Practitioner cases/documentation I have personally evaluated this patient and have completed at least one if not all key elements of the E/M (history, physical exam, and MDM). Additional findings are as noted. I have personally seen and evaluated the patient. I find the patient's history and physical exam are consistent with the NP/PA documentation. I agree with the care provided, treatment rendered, disposition and follow-up plan. Describes heart palpitations which she associates with anxiety patient has no symptoms at the present time        EKG Interpretation    Interpreted by emergency department physician    Rhythm: normal sinus   Rate: normal  Axis: normal  Conduction: normal  ST Segments: no acute change  T Waves: no acute change  Q Waves: no acute change    Clinical Impression:  nonspecific EKG. Critical Care     Rashmi Bush M.D.   Attending Emergency  Physician              Yolis Salmon MD  03/12/21 1932

## 2021-03-13 NOTE — ED NOTES
Writer introduced self to PT. PT states the pain is more in her Left shoulder/arm more than her chest - She does state that she did have palpitations, but that had subsided earlier. 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       Luli Johnson, RN  03/12/21 1944

## 2021-03-16 ENCOUNTER — TELEPHONE (OUTPATIENT)
Dept: PHARMACY | Age: 41
End: 2021-03-16

## 2021-03-16 NOTE — TELEPHONE ENCOUNTER
Spoke with patient for COVID 19 screening secondary to upcoming appointment tomorrow . At this time patient denies symptoms, recent (previous 14 days) positive covid test, recent travel and exposure. Patient will report to [facility] at scheduled time. Patient educated to call physician or go to ER with respiratory symptoms or fever and to not present to appointment if symptomatic. Will coordinate for next appointment accordingly.      Nessa Bragg, PharmD Candidate

## 2021-03-17 ENCOUNTER — TELEPHONE (OUTPATIENT)
Dept: PHARMACY | Age: 41
End: 2021-03-17

## 2021-03-18 ENCOUNTER — OFFICE VISIT (OUTPATIENT)
Dept: FAMILY MEDICINE CLINIC | Age: 41
End: 2021-03-18
Payer: MEDICARE

## 2021-03-18 VITALS
BODY MASS INDEX: 35.07 KG/M2 | HEART RATE: 115 BPM | WEIGHT: 205.4 LBS | HEIGHT: 64 IN | SYSTOLIC BLOOD PRESSURE: 148 MMHG | TEMPERATURE: 96.6 F | DIASTOLIC BLOOD PRESSURE: 92 MMHG

## 2021-03-18 DIAGNOSIS — I10 ESSENTIAL HYPERTENSION: Primary | ICD-10-CM

## 2021-03-18 DIAGNOSIS — Z87.898 HISTORY OF PALPITATIONS: ICD-10-CM

## 2021-03-18 DIAGNOSIS — F41.9 ANXIETY: ICD-10-CM

## 2021-03-18 PROCEDURE — G8427 DOCREV CUR MEDS BY ELIG CLIN: HCPCS | Performed by: STUDENT IN AN ORGANIZED HEALTH CARE EDUCATION/TRAINING PROGRAM

## 2021-03-18 PROCEDURE — G8482 FLU IMMUNIZE ORDER/ADMIN: HCPCS | Performed by: STUDENT IN AN ORGANIZED HEALTH CARE EDUCATION/TRAINING PROGRAM

## 2021-03-18 PROCEDURE — 4004F PT TOBACCO SCREEN RCVD TLK: CPT | Performed by: STUDENT IN AN ORGANIZED HEALTH CARE EDUCATION/TRAINING PROGRAM

## 2021-03-18 PROCEDURE — G8417 CALC BMI ABV UP PARAM F/U: HCPCS | Performed by: STUDENT IN AN ORGANIZED HEALTH CARE EDUCATION/TRAINING PROGRAM

## 2021-03-18 PROCEDURE — 99213 OFFICE O/P EST LOW 20 MIN: CPT | Performed by: STUDENT IN AN ORGANIZED HEALTH CARE EDUCATION/TRAINING PROGRAM

## 2021-03-18 RX ORDER — BUSPIRONE HYDROCHLORIDE 7.5 MG/1
7.5 TABLET ORAL 2 TIMES DAILY
Qty: 60 TABLET | Refills: 1 | Status: SHIPPED | OUTPATIENT
Start: 2021-03-18 | End: 2021-04-17

## 2021-03-18 ASSESSMENT — ENCOUNTER SYMPTOMS
VOMITING: 0
WHEEZING: 0
RHINORRHEA: 0
NAUSEA: 0
DIARRHEA: 0
SHORTNESS OF BREATH: 0
CONSTIPATION: 0
BACK PAIN: 0
ABDOMINAL PAIN: 0
COUGH: 0
SORE THROAT: 0

## 2021-03-18 ASSESSMENT — PATIENT HEALTH QUESTIONNAIRE - PHQ9
SUM OF ALL RESPONSES TO PHQ9 QUESTIONS 1 & 2: 0
1. LITTLE INTEREST OR PLEASURE IN DOING THINGS: 0
SUM OF ALL RESPONSES TO PHQ QUESTIONS 1-9: 0

## 2021-03-18 NOTE — PATIENT INSTRUCTIONS
Thank you for letting us take care of you today. We hope all your questions were addressed. If a question was overlooked or something else comes to mind after you return home, please contact a member of your Care Team listed below. Your Care Team at [place] [address] is Team #2  Angeles Thompson DO (Faculty)  Richard Otero (Faculty)  Adela Candelario MD (Resident)  Jeanna Swartz MD (Resident)  Jerel Doty MD (Resident)  Mackenzie Tanner MD (Resident)  Don Zuluaga MD (Resident)  Eve Sosa LPN  [facility] ,[facility]  [facility] ([address] [facility] office)   [place], [address] (Clinical Practice Manager)  Kate Samson [facility] (Clinical Pharmacist)     Office phone number: [phone]    If you need to get in right away due to illness, please be advised we have \"Same Day\" appointments available Monday-Friday. Please call us at [phone] option #3 to schedule your \"Same Day\" appointment.

## 2021-03-18 NOTE — PROGRESS NOTES
[address] [facility] Medicine Residency Program - Outpatient Note      Iraj Meyer is a 38 y.o. female Established patient, presented to the office today for:  Chief Complaint   Patient presents with   [facility] ED Follow-up     [facility] 3-12-21 for heart palpations     Bleeding/Bruising     brusing easlily          ASSESSMENT/PLAN:    1. Essential hypertension  - BP is borderline elevated, HR is also elevated. Will increase lopressor and pt has appt with cardiology soon. - metoprolol tartrate (LOPRESSOR) 25 MG tablet; Take 1 tablet by mouth 2 times daily  Dispense: 60 tablet; Refill: 3    2. Anxiety  -  Would like to speak to a therapist, and is willing to try a different medication. We discussed starting buspar and following up in 1 month. Will discontinue lexapro. Schedule with Dr. Gilson Crawford. - busPIRone (BUSPAR) 7.5 MG tablet; Take 1 tablet by mouth 2 times daily  Dispense: 60 tablet; Refill: 1    3. History of palpitations  - Pt was seen in the ED for palpitations, likely secondary to anxiety, but with history of MI and stents will get holter monitor, pt to follow up with cardiology. - Holter Monitor 48 Hour; Future          Requested Prescriptions     Signed Prescriptions Disp Refills    metoprolol tartrate (LOPRESSOR) 25 MG tablet 60 tablet 3     Sig: Take 1 tablet by mouth 2 times daily    busPIRone (BUSPAR) 7.5 MG tablet 60 tablet 1     Sig: Take 1 tablet by mouth 2 times daily       Medications Discontinued During This Encounter   Medication Reason    escitalopram (LEXAPRO) 10 MG tablet LIST CLEANUP    metoprolol tartrate (LOPRESSOR) 25 MG tablet        Return in about 4 weeks (around 4/15/2021) for anxiety. SUBJECTIVE/OBJECTIVE:      Iraj Meyer is a 38 y.o. [place] Ruby  has a past medical history of Anxiety and Hypertension. HPI  ED follow up for palpitations:    GAD7 shows moderate anxiety disorder.  Pt has tried lexapro but quit after less than 1 month because she did not like how it made her feel. States she felt \"down\" when on it. She feels anxious several days a week, denies feeling depressed or suicidal.    Pt has history of MI, told she has congenital condition but does not remember the name. Pt had elevated BP at todays visit and tachycardia, states she feels anxious. Will follow up with cardiology, but will increase metoprolol. Review of Systems   Constitutional: Negative for chills, diaphoresis and fever. HENT: Negative for congestion, rhinorrhea and sore throat. Eyes: Negative for visual disturbance. Respiratory: Negative for cough, shortness of breath and wheezing. Cardiovascular: Positive for palpitations. Negative for chest pain and leg swelling. Gastrointestinal: Negative for abdominal pain, constipation, diarrhea, nausea and vomiting. Genitourinary: Negative for difficulty urinating and dysuria. Musculoskeletal: Negative for arthralgias, back pain and myalgias. Skin: Negative for rash. Neurological: Negative for dizziness, light-headedness and headaches. Psychiatric/Behavioral: Negative for dysphoric mood and suicidal ideas. The patient is nervous/anxious. The patient has a   Family History   Problem Relation Age of Onset    Liver Disease Mother     Cancer Father         bowel    Sickle Cell Anemia Sister     Asthma Son        BP (!) 156/93 (Site: Left Upper Arm, Position: Sitting, Cuff Size: Large Adult) Comment: machine  Pulse 115   Temp 96.6 °F (35.9 °C) (Temporal)   Ht 5' 4\" (1.626 m)   Wt 205 lb 6.4 oz (93.2 kg)   LMP 03/07/2021   BMI 35.26 kg/m²    BP Readings from Last 3 Encounters:   03/18/21 (!) 156/93   03/12/21 (!) 153/96   12/03/20 119/73       Physical Exam  Vitals signs and nursing note reviewed. Constitutional:       General: She is not in acute distress. Comments: Pt appears anxious but not in acute distress   Eyes:      Extraocular Movements: Extraocular movements intact. Conjunctiva/sclera: Conjunctivae normal.   Cardiovascular:      Rate and Rhythm: Normal rate and regular rhythm. Pulses: Normal pulses. Heart sounds: Normal heart sounds. Comments: Tachycardia on BP check but during physical exam HR was near normal  Pulmonary:      Effort: Pulmonary effort is normal.      Breath sounds: Normal breath sounds. Abdominal:      General: Bowel sounds are normal. There is no distension. Palpations: Abdomen is soft. Tenderness: There is no abdominal tenderness. There is no guarding. Musculoskeletal:      Right lower leg: No edema. Left lower leg: No edema. Neurological:      General: No focal deficit present. Mental Status: She is alert. Lab Results   Component Value Date    WBC 5.8 03/12/2021    HGB 11.2 (L) 03/12/2021    HCT 35.4 (L) 03/12/2021     03/12/2021    CHOL 129 07/22/2020    TRIG 68 07/22/2020    HDL 45 07/22/2020    ALT 12 11/30/2020    AST 15 11/30/2020     03/12/2021    K 3.5 (L) 03/12/2021     03/12/2021    CREATININE 0.56 03/12/2021    BUN 9 03/12/2021    CO2 24 03/12/2021    TSH 1.19 03/12/2021    INR 1.0 09/22/2020    LABA1C 5.6 06/14/2020     Lab Results   Component Value Date    CALCIUM 8.9 03/12/2021     Lab Results   Component Value Date    LDLCHOLESTEROL 70 07/22/2020       Discussed use,benefit, and side effects of prescribed medications. Barriers to medication compliance addressed. All patient questions answered. Pt voiced understanding. Joo Coffman MD  Family Medicine PGY-2  03/18/21 at 4:26 PM      Disclaimer: Some orall of this note was transcribed using voice-recognition software. This may cause typographical errors occasionally. Although all effort is made to fix these errors, please do not hesitate to contact our office if there Spero Getting concern with the understanding of this note. An electronic signature was used to authenticate this note.

## 2021-03-18 NOTE — PROGRESS NOTES
Visit Information    Have you changed or started any medications since your last visit including any over-the-counter medicines, vitamins, or herbal medicines? no   Have you stopped taking any of your medications? Is so, why? -  Yes see list  Are you having any side effects from any of your medications? - no    Have you seen any other physician or provider since your last visit?  no   Have you had any other diagnostic tests since your last visit?  no   Have you been seen in the emergency room and/or had an admission in a hospital since we last saw you?  [place]  Have you had your routine dental cleaning in the past 6 months?  no     Do you have an active MyChart account? If no, what is the barrier?   Yes    Patient Care Team:  Deborah Eric MD as PCP - General (Family Medicine)  Rolando Yee MD as Surgeon (Cardiology)  Ford Rodriguez MD as Consulting Physician (Cardiology)    Medical History Review  Past Medical, Family, and Social History reviewed and does not contribute to the patient presenting condition    Health Maintenance   Topic Date Due    Hepatitis C screen  Never done    Pneumococcal 0-64 years Vaccine (1 of 1 - PPSV23) Never done    HIV screen  Never done    DTaP/Tdap/Td vaccine (1 - Tdap) Never done    Lipid screen  07/22/2021    Potassium monitoring  03/12/2022    Creatinine monitoring  03/12/2022    Diabetes screen  06/14/2023    Cervical cancer screen  09/22/2025    Flu vaccine  Completed    Hepatitis A vaccine  Aged Out    Hepatitis B vaccine  Aged Out    Hib vaccine  Aged Out    Meningococcal (ACWY) vaccine  Aged Out

## 2021-03-24 ENCOUNTER — TELEPHONE (OUTPATIENT)
Dept: PHARMACY | Age: 41
End: 2021-03-24

## 2021-03-24 NOTE — TELEPHONE ENCOUNTER
Called patient to reschedule missed appointment in [facility].  Spoke to patient and rescheduled for Tuesday 3/30/21 @ 8:30am.     Dayo Crawford, PharmD Candidate

## 2021-03-25 LAB
EKG ATRIAL RATE: 99 BPM
EKG P AXIS: 52 DEGREES
EKG P-R INTERVAL: 200 MS
EKG Q-T INTERVAL: 358 MS
EKG QRS DURATION: 84 MS
EKG QTC CALCULATION (BAZETT): 459 MS
EKG R AXIS: 35 DEGREES
EKG T AXIS: 28 DEGREES
EKG VENTRICULAR RATE: 99 BPM

## 2021-03-30 ENCOUNTER — HOSPITAL ENCOUNTER (EMERGENCY)
Age: 41
Discharge: HOME | End: 2021-03-30
Attending: EMERGENCY MEDICINE
Payer: MEDICARE

## 2021-03-30 ENCOUNTER — APPOINTMENT (OUTPATIENT)
Dept: CT IMAGING | Age: 41
End: 2021-03-30
Payer: MEDICARE

## 2021-03-30 ENCOUNTER — TELEPHONE (OUTPATIENT)
Dept: PHARMACY | Age: 41
End: 2021-03-30

## 2021-03-30 VITALS
OXYGEN SATURATION: 100 % | TEMPERATURE: 98.3 F | HEART RATE: 98 BPM | SYSTOLIC BLOOD PRESSURE: 159 MMHG | DIASTOLIC BLOOD PRESSURE: 96 MMHG | RESPIRATION RATE: 18 BRPM
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
## 2021-03-30 DIAGNOSIS — S00.03XA CONTUSION OF SCALP, INITIAL ENCOUNTER: ICD-10-CM

## 2021-03-30 PROCEDURE — 6370000000 HC RX 637 (ALT 250 FOR IP): Performed by: STUDENT IN AN ORGANIZED HEALTH CARE EDUCATION/TRAINING PROGRAM

## 2021-03-30 PROCEDURE — 70450 CT HEAD/BRAIN W/O DYE: CPT

## 2021-03-30 PROCEDURE — 99284 EMERGENCY DEPT VISIT MOD MDM: CPT

## 2021-03-30 RX ORDER — ACETAMINOPHEN 500 MG
1000 TABLET ORAL ONCE
Status: COMPLETED | OUTPATIENT
Start: 2021-03-30 | End: 2021-03-30

## 2021-03-30 RX ADMIN — ACETAMINOPHEN 1000 MG: 500 TABLET ORAL at 11:22

## 2021-03-30 ASSESSMENT — ENCOUNTER SYMPTOMS
ABDOMINAL PAIN: 0
CONSTIPATION: 0
SORE THROAT: 0
NAUSEA: 0
TROUBLE SWALLOWING: 0
ABDOMINAL DISTENTION: 0
VOMITING: 0
BACK PAIN: 0
SHORTNESS OF BREATH: 0
CHEST TIGHTNESS: 0
RHINORRHEA: 0
WHEEZING: 0
DIARRHEA: 0
PHOTOPHOBIA: 0

## 2021-03-30 ASSESSMENT — PAIN DESCRIPTION - PAIN TYPE: TYPE: ACUTE PAIN

## 2021-03-30 ASSESSMENT — PAIN SCALES - GENERAL: PAINLEVEL_OUTOF10: 8

## 2021-03-30 ASSESSMENT — PAIN DESCRIPTION - LOCATION: LOCATION: HEAD

## 2021-03-30 NOTE — ED PROVIDER NOTES
or altered mentation  Normal sensation and muscle strength  Cranial nerves intact  Chest wall and ribs are nontender  Abdomen soft and nontender  Heart is regular in rate and rhythm  Lungs clear to auscultation  No apparent acute extremity trauma    Patient has no midline cervical spine discomfort  No evidence for altered mentation or intoxication  No distracting injury  No neurologic deficits  Cervical spine imaging not indicated    CT shows no skull fracture or intracranial process    Patient updated  Stable for outpatient follow-up        Eunice Hood DO  Attending Emergency Physician       Amy Watson DO  03/30/21 [id number]

## 2021-03-30 NOTE — ED PROVIDER NOTES
[facility] ED  Emergency Department Encounter  Emergency Medicine Resident     Pt Name: Abel Rincon  MRN: [id number]  [place] 1980  Date of evaluation: 3/30/21  PCP:  Suraj Arnold MD    CHIEF COMPLAINT       Chief Complaint   Patient presents with    Fall       HISTORY Josephpastora  (Location/Symptom, Timing/Onset, Context/Setting, Quality, Duration, Modifying Mery Betts.)      Abel Rincon is a 38 y.o. female with history of chest pain, STEMI, coronary artery disease, trichomonas, thickened endometrium who presents with concerns for fall downstairs with head, hand pain without loss of consciousness patient suffered a mechanical fall, stated her left leg gave out from her on the second upon stop while she was walking into her basement. Patient states that she struck the left side of her head on a wall hard find to the ground. Patient denies loss of consciousness, denies focal neurological deficit, weakness, does endorse left-sided head pain at the posterior parietal region, denies nausea, vomiting, does not altered mentation, is on Brilinta with concerns for cardiac surgery for antiplatelet management. PAST MEDICAL / SURGICAL / SOCIAL / FAMILY HISTORY      has a past medical history of Anxiety and Hypertension. has a past surgical history that includes  section; Carpal tunnel release; Achilles tendon surgery; and Cardiac surgery (2020).      Social History     Socioeconomic History    Marital status: Single     Spouse name: Not on file    Number of children: Not on file    Years of education: Not on file    Highest education level: Not on file   Occupational History    Not on file   Social Needs    Financial resource strain: Not on file    Food insecurity     Worry: Not on file     Inability: Not on file    Transportation needs     Medical: Not on file     Non-medical: Not on file   Tobacco Use    Smoking status: Light Tobacco Smoker Packs/day: 0.50     Types: Cigarettes     Last attempt to quit: 2020     Years since quittin.7    Smokeless tobacco: Never Used   Substance and Sexual Activity    Alcohol use: Not Currently    Drug use: Not Currently     Types: Marijuana    Sexual activity: Not Currently   Lifestyle    Physical activity     Days per week: Not on file     Minutes per session: Not on file    Stress: Not on file   Relationships    Social connections     Talks on phone: Not on file     Gets together: Not on file     Attends [employer] service: Not on file     Active member of club or organization: Not on file     Attends meetings of clubs or organizations: Not on file     Relationship status: Not on file    Intimate partner violence     Fear of current or ex partner: Not on file     Emotionally abused: Not on file     Physically abused: Not on file     Forced sexual activity: Not on file   Other Topics Concern    Not on file   Social History Narrative    Not on file       Family History   Problem Relation Age of Onset    Liver Disease Mother     Cancer Father         bowel    Sickle Cell Anemia Sister     Asthma Son         Allergies:  Latex    Home Medications:  Prior to Admission medications    Medication Sig Start Date End Date Taking?  Authorizing Provider   metoprolol tartrate (LOPRESSOR) 25 MG tablet Take 1 tablet by mouth 2 times daily 3/18/21   Tali Reddy MD   busPIRone (BUSPAR) 7.5 MG tablet Take 1 tablet by mouth 2 times daily 3/18/21 4/17/21  Tali Reddy MD   diclofenac sodium (VOLTAREN) 1 % GEL Apply 4 g topically 4 times daily 20   Jhoana Unger MD   aspirin 81 MG EC tablet Take 1 tablet by mouth daily 20   CARISSA Mays NP   ticagrelor (BRILINTA) 90 MG TABS tablet Take 1 tablet by mouth 2 times daily 20   CARISSA Mays NP   nitroGLYCERIN (NITROSTAT) 0.4 MG SL tablet Place 1 tablet under the tongue every 5 minutes as needed for Chest pain up to max of 3 total doses. If no relief after 1 dose, call 911. 7/23/20   CARISSA Morales - NP   atorvastatin (LIPITOR) 40 MG tablet Take 1 tablet by mouth nightly 7/23/20   Nohemi Fajardo APRN - NP   lisinopril (PRINIVIL;ZESTRIL) 10 MG tablet Take 1 tablet by mouth daily 6/17/20   [facility] Buddy, APRN - CNP   amLODIPine (NORVASC) 5 MG tablet Take 1 tablet by mouth daily 6/17/20   [facility] Buddy, APRN - GREG       REVIEW OFSYSTEMS    (2-9 systems for level 4, 10 or more for level 5)      Review of Systems   Constitutional: Negative for chills, fatigue and fever. HENT: Negative for hearing loss, rhinorrhea, sneezing, sore throat, tinnitus and trouble swallowing. Eyes: Negative for photophobia and visual disturbance. Respiratory: Negative for chest tightness, shortness of breath and wheezing. Cardiovascular: Negative for chest pain and palpitations. Gastrointestinal: Negative for abdominal distention, abdominal pain, constipation, diarrhea, nausea and vomiting. Endocrine: Negative for polyuria. Genitourinary: Negative for dysuria, flank pain, frequency, vaginal bleeding and vaginal discharge. Musculoskeletal: Negative for arthralgias, back pain, gait problem and neck pain. Neurological: Positive for headaches. Negative for dizziness, tremors, seizures, syncope, facial asymmetry and numbness. Psychiatric/Behavioral: Negative for self-injury and suicidal ideas. PHYSICAL EXAM   (up to 7 for level 4, 8 or more forlevel 5)      INITIAL VITALS:   ED Triage Vitals [03/30/21 1056]   BP Temp Temp Source Pulse Resp SpO2 Height Weight   -- 98.3 °F (36.8 °C) Skin -- -- -- -- --       Physical Exam  Constitutional:       Appearance: She is not ill-appearing or diaphoretic. HENT:      Head: Normocephalic.       Comments: Hematoma, soft tissue contusion, bruising noted to the left posterior occipital region of the scalp, tympanic membrane normal bilaterally, no signs of basilar skull fracture including raccoon eyes, nasal drainage     Right Ear: External ear normal.      Left Ear: External ear normal.      Nose: No rhinorrhea. Mouth/Throat:      Mouth: Mucous membranes are moist.   Eyes:      Extraocular Movements: Extraocular movements intact. Conjunctiva/sclera: Conjunctivae normal.   Cardiovascular:      Rate and Rhythm: Normal rate and regular rhythm. Pulmonary:      Effort: Pulmonary effort is normal. No respiratory distress. Abdominal:      General: Abdomen is flat. Musculoskeletal: Normal range of motion. General: No tenderness or signs of injury. Skin:     General: Skin is warm. Capillary Refill: Capillary refill takes less than 2 seconds. Neurological:      Mental Status: She is alert and oriented to person, place, and time. Mental status is at baseline. DIFFERENTIAL  DIAGNOSIS     PLAN (LABS / IMAGING / EKG):  Orders Placed This Encounter   Procedures    CT HEAD WO CONTRAST       MEDICATIONS ORDERED:  Orders Placed This Encounter   Medications    acetaminophen (TYLENOL) tablet 1,000 mg       DDX: Subdural hematoma subdural hematoma, subarachnoid hemorrhage, fall mechanical fall,    Initial MDM/Plan/ED COURSE:    38 y.o. female who presents with obvious area of swelling in the setting of a fall down 2 stairs. Plan a CT scan as patient is on antiplatelet medication, Brilinta. Patient has no cervical spinal tenderness, has full range of motion, does not signs of basilar skull fracture on physical examination, plan to reassess.     ED Course as of Mar 30 1833   Tue Mar 30, 2021   1129 CT head is read by second emergency medicine resident does not show signs of epidural, subdural, subarachnoid hemorrhage, does show soft tissue swelling, edema consistent with bruising noted on the left parietal, occiput    [GP]      ED Course User Index  [GP] Wilman Zapien MD   Patient's headache treated with Tylenol, CT scan is negative for acute pathology, does show parieto-occipital scalp hematoma however no fracture or acute intracranial process. Patient cleared for discharge, given discharge precautions. Return precautions. DIAGNOSTIC RESULTS / EMERGENCYDEPARTMENT COURSE / MDM     LABS:  Labs Reviewed - No data to display        Ct Head Wo Contrast    Result Date: 3/30/2021  EXAMINATION: CT OF THE HEAD WITHOUT CONTRAST  3/30/2021 11:20 am TECHNIQUE: CT of the head was performed without the administration of intravenous contrast. Dose modulation, iterative reconstruction, and/or weight based adjustment of the mA/kV was utilized to reduce the radiation dose to as low as reasonably achievable. COMPARISON: None. HISTORY: ORDERING SYSTEM PROVIDED HISTORY: concern for left sided parietal soft tissue swelling post fall, on brillinta TECHNOLOGIST PROVIDED HISTORY: concern for left sided parietal soft tissue swelling post fall, on brillinta Decision Support Exception->Emergency Medical Condition (MA) Is the patient pregnant?->No Reason for Exam: concern for left sided parietalsoft tissue swelling FINDINGS: BRAIN/VENTRICLES: There is no acute intracranial hemorrhage, mass effect or midline shift. No abnormal extra-axial fluid collection. The gray-white differentiation is maintained without evidence of an acute infarct. There is no evidence of hydrocephalus. ORBITS: The visualized portion of the orbits demonstrate no acute abnormality. SINUSES: The visualized paranasal sinuses and mastoid air cells demonstrate no acute abnormality. SOFT TISSUES/SKULL:  There is no acute abnormality of the visualized skull. There is a small left parietooccipital scalp hematoma. 1.  Small left parieto-occipital scalp hematoma. 2.  No acute intracranial process. PROCEDURES:  None    CONSULTS:  None    CRITICAL CARE:  Please see attending note    FINAL IMPRESSION      1. Fall, initial encounter    2.  Contusion of scalp, initial encounter          DISPOSITION / PLAN     DISPOSITION Decision To Discharge 03/30/2021 11:54:30 AM      PATIENT REFERRED TO:  No follow-up provider specified.     DISCHARGE MEDICATIONS:  Discharge Medication List as of 3/30/2021 11:56 AM          Anju Brown MD  Emergency Medicine Resident    (Please note that portions of this note were completed with a voice recognition program.Efforts were made to edit the dictations but occasionally words are mis-transcribed.)       Anju Brown MD  Resident  03/30/21 [id number]

## 2021-03-30 NOTE — TELEPHONE ENCOUNTER
Patient was a no show for [facility] appointment today for smoking cessation  Left message for patient to call back to reschedule.

## 2021-04-13 ENCOUNTER — TELEPHONE (OUTPATIENT)
Dept: PHARMACY | Age: 41
End: 2021-04-13

## 2021-04-13 NOTE — TELEPHONE ENCOUNTER
Called patient to reschedule missed appointment for smoking cessation. Attempted to leave a message, but at this time, voicemail box is not set up.     Anabel Juarez, PharmD, 4/13/2021 4:45 PM

## 2021-04-13 NOTE — TELEPHONE ENCOUNTER
Patient returned call to reschedule appt for smoking cessation visit. Scheduled patient for 4/28.     Jacque Au, PharmD, 4/13/2021 5:03 PM

## 2021-04-28 ENCOUNTER — TELEPHONE (OUTPATIENT)
Dept: PHARMACY | Age: 41
End: 2021-04-28

## 2021-04-30 ENCOUNTER — TELEPHONE (OUTPATIENT)
Dept: PHARMACY | Age: 41
End: 2021-04-30

## 2021-06-22 ENCOUNTER — TELEPHONE (OUTPATIENT)
Dept: PHARMACY | Age: 41
End: 2021-06-22

## 2021-06-29 ENCOUNTER — TELEPHONE (OUTPATIENT)
Dept: PHARMACY | Age: 41
End: 2021-06-29

## 2021-07-07 ENCOUNTER — TELEPHONE (OUTPATIENT)
Dept: PHARMACY | Age: 41
End: 2021-07-07

## 2021-07-07 NOTE — TELEPHONE ENCOUNTER
Patient has failed to show for at least 9 appointments for Smoking cessation. Only made initial appointment 9/25/2020    Referral will be canceled and patient removed from active patient list    Note routed to Dr Juliana Novak, referring MD Rocklin Babinski, [address]   [address] [facility]  [phone]

## 2021-08-11 ENCOUNTER — TELEPHONE (OUTPATIENT)
Dept: FAMILY MEDICINE CLINIC | Age: 41
End: 2021-08-11

## 2021-08-11 NOTE — TELEPHONE ENCOUNTER
Called patient, patient states she fell at work and is requesting we order a MRI. Writer informed patient that we do not take WC and advised her to call [facility] for a WC provider.

## 2021-08-11 NOTE — TELEPHONE ENCOUNTER
----- Message from Anatoliy Grijalva sent at 8/11/2021 12:50 PM EDT -----  Subject: Referral Request    QUESTIONS   Reason for referral request? Patient needs a referral from Dr Margaret Barone for a   MRI on her left shoulder and left hip. She doesn't know where she wants   them sent to. Office can give patient a call at [phone]. Has the physician seen you for this condition before? No   Preferred Specialist (if applicable)? Do you already have an appointment scheduled? No  Additional Information for Provider?   ---------------------------------------------------------------------------  --------------  CALL BACK INFO  What is the best way for the office to contact you? OK to leave message on   voicemail  Preferred Call Back Phone Number?  [phone]

## 2021-09-16 ENCOUNTER — OFFICE VISIT (OUTPATIENT)
Dept: FAMILY MEDICINE CLINIC | Age: 41
End: 2021-09-16
Payer: MEDICARE

## 2021-09-16 VITALS
HEART RATE: 80 BPM | WEIGHT: 220 LBS | DIASTOLIC BLOOD PRESSURE: 90 MMHG | TEMPERATURE: 97.4 F | SYSTOLIC BLOOD PRESSURE: 160 MMHG | BODY MASS INDEX: 37.76 KG/M2

## 2021-09-16 DIAGNOSIS — E87.6 HYPOKALEMIA: ICD-10-CM

## 2021-09-16 DIAGNOSIS — Z13.220 SCREENING FOR HYPERLIPIDEMIA: ICD-10-CM

## 2021-09-16 DIAGNOSIS — I10 ESSENTIAL HYPERTENSION: Primary | ICD-10-CM

## 2021-09-16 DIAGNOSIS — M79.602 PAIN OF LEFT UPPER EXTREMITY: ICD-10-CM

## 2021-09-16 DIAGNOSIS — I21.02 STEMI INVOLVING LEFT ANTERIOR DESCENDING CORONARY ARTERY (HCC): ICD-10-CM

## 2021-09-16 DIAGNOSIS — F41.9 ANXIETY: ICD-10-CM

## 2021-09-16 DIAGNOSIS — F17.200 SMOKING: ICD-10-CM

## 2021-09-16 PROCEDURE — 99211 OFF/OP EST MAY X REQ PHY/QHP: CPT

## 2021-09-16 PROCEDURE — 4004F PT TOBACCO SCREEN RCVD TLK: CPT

## 2021-09-16 PROCEDURE — 99213 OFFICE O/P EST LOW 20 MIN: CPT

## 2021-09-16 PROCEDURE — G8427 DOCREV CUR MEDS BY ELIG CLIN: HCPCS

## 2021-09-16 PROCEDURE — G8417 CALC BMI ABV UP PARAM F/U: HCPCS

## 2021-09-16 RX ORDER — AMLODIPINE BESYLATE 5 MG/1
5 TABLET ORAL DAILY
Qty: 14 TABLET | Refills: 0 | Status: SHIPPED | OUTPATIENT
Start: 2021-09-16 | End: 2021-12-15

## 2021-09-16 RX ORDER — CITALOPRAM 10 MG/1
10 TABLET ORAL DAILY
Qty: 90 TABLET | Refills: 1 | Status: SHIPPED | OUTPATIENT
Start: 2021-09-16 | End: 2022-02-16 | Stop reason: SINTOL

## 2021-09-16 RX ORDER — LISINOPRIL 10 MG/1
10 TABLET ORAL DAILY
Qty: 14 TABLET | Refills: 0 | Status: SHIPPED | OUTPATIENT
Start: 2021-09-16 | End: 2021-12-15

## 2021-09-16 ASSESSMENT — ENCOUNTER SYMPTOMS
SHORTNESS OF BREATH: 0
COLOR CHANGE: 0
CHEST TIGHTNESS: 0
NAUSEA: 0
COUGH: 0
DIARRHEA: 0
ABDOMINAL PAIN: 0
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
## 2021-09-16 NOTE — PATIENT INSTRUCTIONS
Thank you for letting us take care of you today. We hope all your questions were addressed. If a question was overlooked or something else comes to mind after you return home, please contact a member of your Care Team listed below. Your Care Team at [place] [address] is Team #4  Nelson Palma MD (Faculty)  Bobby Pham MD (Resident)  Jany Childs MD (Resident)  Adelita Cheng MD (Resident)  Veena Champion MD (Resident)  NICHOLAS Villanueva RMA Jewelene Numbers., [facility] office)  Abhijeet Chaves, [address] (Clinical Practice Manager)  Dimitrios Delcid [facility] (Clinical Pharmacist)       Office phone number: [phone]    If you need to get in right away due to illness, please be advised we have \"Same Day\" appointments available Monday-Friday. Please call us at [phone] option #3 to schedule your \"Same Day\" appointment.

## 2021-09-21 NOTE — PROGRESS NOTES
I have reviewed and discussed key elements of Shelia Martinez with the resident including plan of care and follow up and agree with the care princess plan.

## 2021-09-30 ENCOUNTER — OFFICE VISIT (OUTPATIENT)
Dept: FAMILY MEDICINE CLINIC | Age: 41
End: 2021-09-30
Payer: MEDICARE

## 2021-09-30 ENCOUNTER — TELEPHONE (OUTPATIENT)
Dept: FAMILY MEDICINE CLINIC | Age: 41
End: 2021-09-30

## 2021-09-30 DIAGNOSIS — F41.9 ANXIETY: Primary | ICD-10-CM

## 2021-09-30 PROCEDURE — 99999 PR OFFICE/OUTPT VISIT,PROCEDURE ONLY: CPT | Performed by: PSYCHOLOGIST

## 2021-09-30 NOTE — TELEPHONE ENCOUNTER
Patient came into office requesting a medical statement she is healthy, for employment. Physical exam on 09/16/2021, Please call pt when ready for pick-up, phone [phone].       Next Visit Date:  Future Appointments   Date Time Provider Radha Hinson   10/29/2021  1:45 PM Daljit Ruth  [facility] Maintenance   Topic Date Due    Hepatitis C screen  Never done    Pneumococcal 0-64 years Vaccine (1 of 2 - PPSV23) Never done    COVID-19 Vaccine (1) Never done    HIV screen  Never done    DTaP/Tdap/Td vaccine (1 - Tdap) Never done    Lipid screen  07/22/2021    Flu vaccine (1) 09/01/2021    Potassium monitoring  03/12/2022    Creatinine monitoring  03/12/2022    Diabetes screen  06/14/2023    Cervical cancer screen  09/22/2025    Hepatitis A vaccine  Aged Out    Hepatitis B vaccine  Aged Out    Hib vaccine  Aged Out    Meningococcal (ACWY) vaccine  Aged Out       Hemoglobin A1C (%)   Date Value   06/14/2020 5.6             ( goal A1C is < 7)   No results found for: LABMICR  LDL Cholesterol (mg/dL)   Date Value   07/22/2020 70   06/14/2020 103       (goal LDL is <100)   AST (U/L)   Date Value   11/30/2020 15     ALT (U/L)   Date Value   11/30/2020 12     BUN (mg/dL)   Date Value   03/12/2021 9     BP Readings from Last 3 Encounters:   09/16/21 (!) 160/90   03/30/21 (!) 159/96   03/18/21 (!) 148/92          (goal 120/80)    All Future Testing planned in [facility]  Lab Frequency Next Occurrence   CHESTER DIGITAL SCREEN W OR WO CAD BILATERAL Once 10/22/2020   Holter Monitor 48 Hour Once 03/19/2021   Lipid Panel Once 91/27/0641   Basic Metabolic Panel Once 55/83/9100               Patient Active Problem List:     Psychosis ([address]     Hypokalemia     Iron deficiency anemia secondary to inadequate dietary iron intake     Paranoid delusion ([address]     Essential hypertension     Chest pain     STEMI involving left anterior descending coronary artery ([address]     Coronary artery disease involving native coronary artery of native heart without angina pectoris     Dyslipidemia, goal LDL below 70     Class 1 obesity with serious comorbidity and body mass index (BMI) of 34.0 to 34.9 in adult     Chronic left shoulder pain     Chronic foot pain, left     Trichomonosis     Thickened endometrium     Ego-dystonic lesbianism     History of palpitations     Anxiety

## 2021-09-30 NOTE — PROGRESS NOTES
Time: 3:35-4:20pm    Provider met with patient for educational group before being scheduled with provider. Provided psycho-education about depression, anxiety, sleep, exercise, and self-care. Engaged in relaxation exercise. Pt. did sign consent form after being provided with information about brief approach to therapy and confidentiality. Patient did schedule individual appointment with provider following group.

## 2021-10-14 ENCOUNTER — HOSPITAL ENCOUNTER (EMERGENCY)
Age: 41
Discharge: HOME | End: 2021-10-14
Attending: EMERGENCY MEDICINE
Payer: MEDICARE

## 2021-10-14 ENCOUNTER — APPOINTMENT (OUTPATIENT)
Dept: GENERAL RADIOLOGY | Age: 41
End: 2021-10-14
Payer: MEDICARE

## 2021-10-14 VITALS
TEMPERATURE: 97.2 F | RESPIRATION RATE: 18 BRPM | HEIGHT: 64 IN | OXYGEN SATURATION: 97 % | SYSTOLIC BLOOD PRESSURE: 156 MMHG | HEART RATE: 83 BPM | BODY MASS INDEX: 37.56 KG/M2 | WEIGHT: 220 LBS | DIASTOLIC BLOOD PRESSURE: 95 MMHG

## 2021-10-14 DIAGNOSIS — M25.512 CHRONIC LEFT SHOULDER PAIN: Primary | ICD-10-CM

## 2021-10-14 DIAGNOSIS — G89.29 CHRONIC LEFT SHOULDER PAIN: Primary | ICD-10-CM

## 2021-10-14 LAB
ANION GAP SERPL CALCULATED.3IONS-SCNC: 11 MMOL/L (ref 9–17)
BUN BLDV-MCNC: 7 MG/DL (ref 6–20)
BUN/CREAT BLD: ABNORMAL (ref 9–20)
CALCIUM SERPL-MCNC: 8.5 MG/DL (ref 8.6–10.4)
CHLORIDE BLD-SCNC: 105 MMOL/L (ref 98–107)
CO2: 22 MMOL/L (ref 20–31)
CREAT SERPL-MCNC: 0.67 MG/DL (ref 0.5–0.9)
GFR AFRICAN AMERICAN: >60 ML/MIN
GFR NON-AFRICAN AMERICAN: >60 ML/MIN
GFR SERPL CREATININE-BSD FRML MDRD: ABNORMAL ML/MIN/{1.73_M2}
GFR SERPL CREATININE-BSD FRML MDRD: ABNORMAL ML/MIN/{1.73_M2}
GLUCOSE BLD-MCNC: 106 MG/DL (ref 70–99)
HCT VFR BLD CALC: 33.4 % (ref 36.3–47.1)
HEMOGLOBIN: 10.5 G/DL (ref 11.9–15.1)
MCH RBC QN AUTO: 27.9 PG (ref 25.2–33.5)
MCHC RBC AUTO-ENTMCNC: 31.4 G/DL (ref 28.4–34.8)
MCV RBC AUTO: 88.6 FL (ref 82.6–102.9)
NRBC AUTOMATED: 0 PER 100 WBC
PDW BLD-RTO: 15.9 % (ref 11.8–14.4)
PLATELET # BLD: 232 K/UL (ref 138–453)
PMV BLD AUTO: 11.2 FL (ref 8.1–13.5)
POTASSIUM SERPL-SCNC: 3.7 MMOL/L (ref 3.7–5.3)
RBC # BLD: 3.77 M/UL (ref 3.95–5.11)
SODIUM BLD-SCNC: 138 MMOL/L (ref 135–144)
TROPONIN INTERP: NORMAL
TROPONIN T: NORMAL NG/ML
TROPONIN, HIGH SENSITIVITY: <6 NG/L (ref 0–14)
WBC # BLD: 7.6 K/UL (ref 3.5–11.3)

## 2021-10-14 PROCEDURE — 96372 THER/PROPH/DIAG INJ SC/IM: CPT

## 2021-10-14 PROCEDURE — 93005 ELECTROCARDIOGRAM TRACING: CPT | Performed by: GENERAL PRACTICE

## 2021-10-14 PROCEDURE — 84484 ASSAY OF TROPONIN QUANT: CPT

## 2021-10-14 PROCEDURE — 80048 BASIC METABOLIC PNL TOTAL CA: CPT

## 2021-10-14 PROCEDURE — 85027 COMPLETE CBC AUTOMATED: CPT

## 2021-10-14 PROCEDURE — 96374 THER/PROPH/DIAG INJ IV PUSH: CPT

## 2021-10-14 PROCEDURE — 6360000002 HC RX W HCPCS: Performed by: GENERAL PRACTICE

## 2021-10-14 PROCEDURE — 99283 EMERGENCY DEPT VISIT LOW MDM: CPT

## 2021-10-14 PROCEDURE — 73030 X-RAY EXAM OF SHOULDER: CPT

## 2021-10-14 PROCEDURE — 71046 X-RAY EXAM CHEST 2 VIEWS: CPT

## 2021-10-14 RX ORDER — CYCLOBENZAPRINE HCL 10 MG
10 TABLET ORAL 3 TIMES DAILY PRN
Qty: 20 TABLET | Refills: 0 | Status: SHIPPED | OUTPATIENT
Start: 2021-10-14 | End: 2021-10-24

## 2021-10-14 RX ORDER — ORPHENADRINE CITRATE 30 MG/ML
60 INJECTION INTRAMUSCULAR; INTRAVENOUS ONCE
Status: COMPLETED | OUTPATIENT
Start: 2021-10-14 | End: 2021-10-14

## 2021-10-14 RX ORDER — KETOROLAC TROMETHAMINE 30 MG/ML
30 INJECTION, SOLUTION INTRAMUSCULAR; INTRAVENOUS ONCE
Status: COMPLETED | OUTPATIENT
Start: 2021-10-14 | End: 2021-10-14

## 2021-10-14 RX ADMIN — ORPHENADRINE CITRATE 60 MG: 60 INJECTION INTRAMUSCULAR; INTRAVENOUS at 05:02

## 2021-10-14 RX ADMIN — KETOROLAC TROMETHAMINE 30 MG: 30 INJECTION, SOLUTION INTRAMUSCULAR; INTRAVENOUS at 05:02

## 2021-10-14 ASSESSMENT — PAIN DESCRIPTION - PAIN TYPE: TYPE: ACUTE PAIN

## 2021-10-14 ASSESSMENT — PAIN DESCRIPTION - PROGRESSION: CLINICAL_PROGRESSION: NOT CHANGED

## 2021-10-14 ASSESSMENT — ENCOUNTER SYMPTOMS
VOMITING: 0
SHORTNESS OF BREATH: 0
COUGH: 0
ABDOMINAL PAIN: 0
NAUSEA: 0

## 2021-10-14 ASSESSMENT — PAIN DESCRIPTION - FREQUENCY: FREQUENCY: CONTINUOUS

## 2021-10-14 ASSESSMENT — PAIN DESCRIPTION - ONSET: ONSET: ON-GOING

## 2021-10-14 ASSESSMENT — PAIN DESCRIPTION - DESCRIPTORS: DESCRIPTORS: ACHING

## 2021-10-14 ASSESSMENT — PAIN SCALES - GENERAL
PAINLEVEL_OUTOF10: 10
PAINLEVEL_OUTOF10: 10

## 2021-10-14 ASSESSMENT — PAIN DESCRIPTION - LOCATION: LOCATION: ARM

## 2021-10-14 NOTE — ED NOTES
Pt to ED for chronic left shoulder pain. Pt states she fell at work back in 2015 and has been going to physical therapy for it. She states the pain is consistent, but she has had full ROM for the most part. Pt woke up 3 days ago and was unable to move her shoulder at all. She can move her elbow, but has no ROM in the shoulder. Pt has cardiac hx of high blood pressure and two stents placed. Pt is alert and oriented x4, RR even and nonlabored. Pt denies any further needs at this time.       Neri Osorio RN  10/14/21 5524

## 2021-10-14 NOTE — ED PROVIDER NOTES
[facility] ED  Emergency Department Encounter  EmergencyMedicine Resident     Pt Name:Shelia Pool  MRN: [id number]  [place] 1980  Date of evaluation: 10/14/21  PCP:  Quinten Harley MD    [address] [place] [address]       Chief Complaint   Patient presents with    Arm Pain    Chest Pain       HISTORY OF PRESENT ILLNESS  (Location/Symptom, Timing/Onset, Context/Setting, Quality, Duration, Modifying Factors, Severity.)      Aydin Hernandez is a 42 y.o. female who presents with left shoulder pain. Patient states that she had a work injury in , underwent physical therapy and specialist care with orthopedics, patient states that she has chronic pain in her left shoulder as well as chronic numbness on the posterior aspect of her arm. Patient states that 2 nights ago she thought she slept on her shoulder wrong, now is having trouble abducting her arm. Denies any new trauma to the area. Denies any new numbness or tingling or weakness. Patient states that she has a sharp pain which feels like her shoulder is catching anytime she tries to raise her arm up. Patient is not taking for her pain. Patient states that she does here for a living but is unable to work for the last 2 days secondary to the pain. Patient does have history of CAD with stents and is on Brilinta and aspirin. Is compliant with her medications. Patient chief complaint did mention chest pain however she denies any chest pain,    PAST MEDICAL / SURGICAL / SOCIAL / FAMILY HISTORY      has a past medical history of Anxiety and Hypertension. has a past surgical history that includes  section; Carpal tunnel release; Achilles tendon surgery; and Cardiac surgery (2020).     Social History     Socioeconomic History    Marital status: Single     Spouse name: Not on file    Number of children: Not on file    Years of education: Not on file    Highest education level: Not on file   Occupational History    Not on file   Tobacco Use    Smoking status: Light Tobacco Smoker     Packs/day: 0.50     Types: Cigarettes     Last attempt to quit: 2020     Years since quittin.2    Smokeless tobacco: Never Used   Substance and Sexual Activity    Alcohol use: Not Currently    Drug use: Not Currently     Types: Marijuana    Sexual activity: Not Currently   Other Topics Concern    Not on file   Social History Narrative    Not on file     Social Determinants of Health     Financial Resource Strain:     Difficulty of Paying Living Expenses:    Food Insecurity:     Worried About Running Out of Food in the Last Year:     Ran Out of Food in the Last Year:    Transportation Needs:     Lack of Transportation (Medical):  Lack of Transportation (Non-Medical):    Physical Activity:     Days of Exercise per Week:     Minutes of Exercise per Session:    Stress:     Feeling of Stress :    Social Connections:     Frequency of Communication with Friends and Family:     Frequency of Social Gatherings with Friends and Family:     Attends Anabaptist Services:     Active Member of Clubs or Organizations:     Attends Club or Organization Meetings:     Marital Status:    Intimate Partner Violence:     Fear of Current or Ex-Partner:     Emotionally Abused:     Physically Abused:     Sexually Abused:        Family History   Problem Relation Age of Onset    Liver Disease Mother     Cancer Father         bowel    Sickle Cell Anemia Sister     Asthma Son        Allergies:  Latex    Home Medications:  Prior to Admission medications    Medication Sig Start Date End Date Taking?  Authorizing Provider   lisinopril (PRINIVIL;ZESTRIL) 10 MG tablet Take 1 tablet by mouth daily 21   Cristopher Smiley MD   nicotine (NICODERM CQ) 7 MG/24HR Place 1 patch onto the skin every 24 hours 21  Cristopher Smiley MD   citalopram (CELEXA) 10 MG tablet Take 1 tablet by mouth daily 21   Cristopher Smiley MD   amLODIPine (NORVASC) 5 MG tablet Take 1 tablet by mouth daily 9/16/21   Fernando Avila MD   metoprolol tartrate (LOPRESSOR) 25 MG tablet Take 1 tablet by mouth 2 times daily 3/18/21   Roberto Mike MD   diclofenac sodium (VOLTAREN) 1 % GEL Apply 4 g topically 4 times daily 9/29/20   Omar Dumas MD   aspirin 81 MG EC tablet Take 1 tablet by mouth daily 7/24/20   Norvel Gums, APRN - NP   ticagrelor (BRILINTA) 90 MG TABS tablet Take 1 tablet by mouth 2 times daily 7/23/20   Norvel Gums, APRN - NP   nitroGLYCERIN (NITROSTAT) 0.4 MG SL tablet Place 1 tablet under the tongue every 5 minutes as needed for Chest pain up to max of 3 total doses. If no relief after 1 dose, call 911. 7/23/20   Norvel Gums, APRN - NP   atorvastatin (LIPITOR) 40 MG tablet Take 1 tablet by mouth nightly 7/23/20   Norvel Gums, APRN - NP       REVIEW OF SYSTEMS    (2-9 systems for level 4, 10 or more for level 5)      Review of Systems   Constitutional: Negative for activity change, appetite change, chills and fever. Respiratory: Negative for cough and shortness of breath. Cardiovascular: Negative for chest pain. Gastrointestinal: Negative for abdominal pain, nausea and vomiting. Musculoskeletal: Negative for gait problem. Left shoulder pain   Neurological: Negative for dizziness, light-headedness and headaches. Psychiatric/Behavioral: Negative for agitation, confusion and self-injury. The patient is not nervous/anxious. PHYSICAL EXAM   (up to 7 for level 4, 8 or more for level 5)      INITIAL VITALS:   BP (!) 156/95   Pulse 87   Temp 97.2 °F (36.2 °C) (Oral)   Resp 16   Ht 5' 4\" (1.626 m)   Wt 220 lb (99.8 kg)   LMP 10/13/2021   SpO2 98%   BMI 37.76 kg/m²     Physical Exam  Vitals reviewed. Constitutional:       General: She is not in acute distress. Appearance: She is not ill-appearing, toxic-appearing or diaphoretic.       Comments: Patient is sitting comfortably on stretcher, is holding her left arm flexed, moving gingerly on the bed   HENT:      Head: Normocephalic and atraumatic. Cardiovascular:      Rate and Rhythm: Normal rate. Pulmonary:      Comments: Breathing comfortably on room air, special chest rise, speaking in full sentences, no evidence respiratory distress  Musculoskeletal:      Comments: Patient has decreased range of motion of her left shoulder, trouble with abduction, is able to abduct to about 90 degrees, normal abduction, does have trouble with flexion of the shoulder, patient has pain with supination over the bicep tendon groove, 5 out of 5 strength of , neurovascularly intact distally   Neurological:      General: No focal deficit present. Mental Status: She is alert and oriented to person, place, and time. Gait: Gait normal.   Psychiatric:         Mood and Affect: Mood normal.         Behavior: Behavior normal.         Thought Content: Thought content normal.         Judgment: Judgment normal.         DIFFERENTIAL  DIAGNOSIS     PLAN (LABS / IMAGING / EKG):  Orders Placed This Encounter   Procedures    XR SHOULDER LEFT (MIN 2 VIEWS)    XR CHEST (2 VW)    CBC    BASIC METABOLIC PANEL    Troponin       MEDICATIONS ORDERED:  Orders Placed This Encounter   Medications    orphenadrine (NORFLEX) injection 60 mg    ketorolac (TORADOL) injection 30 mg       DDX: Arthritis, low suspicion for fracture dislocation, rotator cuff injury, as patient does have history of coronary disease low suspicion for ACS/MI however will plan to check EKG troponin and basic labs.     DIAGNOSTIC RESULTS / EMERGENCY DEPARTMENT COURSE / MDM   :  Results for orders placed or performed during the hospital encounter of 10/14/21   CBC   Result Value Ref Range    WBC 7.6 3.5 - 11.3 k/uL    RBC 3.77 (L) 3.95 - 5.11 m/uL    Hemoglobin 10.5 (L) 11.9 - 15.1 g/dL    Hematocrit 33.4 (L) 36.3 - 47.1 %    MCV 88.6 82.6 - 102.9 fL    MCH 27.9 25.2 - 33.5 pg    MCHC 31.4 28.4 - 34.8 g/dL    RDW 15.9 (H) 11.8 - 14.4 % Platelets 336 797 - 851 k/uL    MPV 11.2 8.1 - 13.5 fL    NRBC Automated 0.0 0.0 per 100 WBC           RADIOLOGY:  EXAMINATION:  XRAY VIEWS OF THE LEFT SHOULDER     10/14/2021 4:41 am     COMPARISON:  10/02/2020     HISTORY:  ORDERING SYSTEM PROVIDED HISTORY: pain  TECHNOLOGIST PROVIDED HISTORY:  pain  Reason for Exam: pain     FINDINGS:  Glenohumeral joint is normally aligned. No evidence of acute fracture or  dislocation. No abnormal periarticular calcifications. The [facility] joint is  unremarkable in appearance.     Visualized lung is unremarkable.     IMPRESSION:  No acute abnormality.      XR CHEST (2 VW)   Performed: 10/14/21 0456  Final  Specimen: Chest          Impression: No acute process. EKG  EKG Interpretation    Interpreted by me    Rhythm: normal sinus   Rate: normal  Axis: normal  Ectopy: none  Conduction: normal  ST Segments: no acute change  T Waves: no acute change  Q Waves: none    Clinical Impression: no acute changes and normal EKG    All EKG's are interpreted by the Emergency Department Physician who either signs or Co-signs this chart in the absence of a cardiologist.    EMERGENCY DEPARTMENT COURSE/IMPRESSION: 23-year-old female present emergency department complaining of acute on chronic left shoulder pain, no trauma, neurovascular intact, no deformity. X-ray was obtained which shows no acute osseous abnormality. Patient has no cardiac complaints however with her history of coronary artery disease and stents basic cardiac work-up was performed which showed no acute abnormality. Educated patient that she will need to follow-up with her primary care providers for repeat physical therapy and referral to orthopedics for further evaluation. Strict ER return precautions were discussed. She was given Toradol and Norflex here in the emergency department, [facility] for discharge.   Patient did have improvement with Toradol and Norflex injection. PROCEDURES:  None    CONSULTS:  None    CRITICAL CARE:  None    FINAL IMPRESSION      1. Chronic left shoulder pain          DISPOSITION / PLAN     DISPOSITION Discharge - Pending Orders Complete 10/14/2021 05:24:40 AM      PATIENT REFERRED TO:  No follow-up provider specified.     DISCHARGE MEDICATIONS:  New Prescriptions    No medications on file       Sowmya Pritchard DO  Emergency Medicine Resident    (Please note that portions of thisnote were completed with a voice recognition program.  Efforts were made to edit the dictations but occasionally words are mis-transcribed.)     Sowmya Pritchard DO  Resident  10/14/21 [id number]

## 2021-10-16 LAB
EKG ATRIAL RATE: 79 BPM
EKG P AXIS: 25 DEGREES
EKG P-R INTERVAL: 204 MS
EKG Q-T INTERVAL: 394 MS
EKG QRS DURATION: 88 MS
EKG QTC CALCULATION (BAZETT): 451 MS
EKG R AXIS: 30 DEGREES
EKG T AXIS: 21 DEGREES
EKG VENTRICULAR RATE: 79 BPM

## 2021-10-19 ENCOUNTER — TELEPHONE (OUTPATIENT)
Dept: FAMILY MEDICINE CLINIC | Age: 41
End: 2021-10-19

## 2021-10-19 NOTE — TELEPHONE ENCOUNTER
Spoke to patient, she was in on 9-. PT stated would need a medical statement letter to be faxed for her physical to her employement. Fax number is [phone]      Pt contact number [phone].

## 2021-10-22 NOTE — ED PROVIDER NOTES
[facility] 44   Emergency Department  Faculty Attestation       I performed a history and physical examination of the patient and discussed management with the resident. I reviewed the residents note and agree with the documented findings including all diagnostic interpretations and plan of care. Any areas of disagreement are noted on the chart. I was personally present for the key portions of any procedures. I have documented in the chart those procedures where I was not present during the key portions. I have reviewed the emergency nurses triage note. I agree with the chief complaint, past medical history, past surgical history, allergies, medications, social and family history as documented unless otherwise noted below. Documentation of the HPI, Physical Exam and Medical Decision Making performed by scribevonne is based on my personal performance of the HPI, PE and MDM. For Physician Assistant/ Nurse Practitioner cases/documentation I have personally evaluated this patient and have completed at least one if not all key elements of the E/M (history, physical exam, and MDM). Additional findings are as noted. Pertinent Comments     Primary Care Physician: Gregory Arreola MD      ED Triage Vitals [10/14/21 0416]   BP Temp Temp Source Pulse Resp SpO2 Height Weight   (!) 156/95 97.2 °F (36.2 °C) Oral 87 16 98 % 5' 4\" (1.626 m) 220 lb (99.8 kg)        History/Physical: This is a 42 y.o. female who presents to the Emergency Department with complaint of left shoulder pain. Patient does have history of a prior injury approximate 6 years ago, and has had some chronic aching and numbness in the area. However 2 days ago she started having worsening pain in that shoulder, now unable to abduct her arm. Also feel that there is a sharp pain underneath her shoulder blade.   Denies any shortness of breath, but does state the pain sometimes wraps around to the front side of the shoulder and into the side of her chest.  No nausea or vomiting. On exam well-appearing no acute distress peer normocephalic/atraumatic. No midline neck tenderness. Heart sounds regular lungs clear to auscultation. Abdomen soft nontender nondistended. Left shoulder does have tenderness along the scapular border, and there is some pain with range of motion. And pain over the [facility] joint. No erythema or warmth of the joint. She has no lower extremity edema. She has normal strength and sensation with  strength and movement of the elbow.   But she does have difficulty with abduction of her left shoulder    MDM/Plan:   Left shoulder pain is likely an overuse injury  No signs of septic joint  However patient does have a significant cardiac history, therefore we will do basic cardiac work-up to rule out any other underlying disease pattern, will treat musculoskeletal pain if cardiac work-up negative likely discharge  EKG Interpretation    Interpreted by me    Rhythm: normal sinus   Rate: normal  Axis: normal  Ectopy: none  Conduction: normal  ST Segments: no acute change  T Waves: no acute change  Q Waves: none    Clinical Impression: no acute changes and normal EKG        Critical Care: None     Ketan Dykes MD  Attending Emergency Physician        Ketan Dykes MD  10/21/21 5399

## 2021-11-10 NOTE — TELEPHONE ENCOUNTER
Please advise, Thank you. There is another encounter from 09/2021 asking for this medical letter as well.

## 2021-12-14 DIAGNOSIS — I10 ESSENTIAL HYPERTENSION: ICD-10-CM

## 2021-12-15 RX ORDER — AMLODIPINE BESYLATE 5 MG/1
TABLET ORAL
Qty: 14 TABLET | Refills: 0 | Status: SHIPPED | OUTPATIENT
Start: 2021-12-15 | End: 2022-02-01 | Stop reason: SDUPTHER

## 2021-12-15 RX ORDER — LISINOPRIL 10 MG/1
TABLET ORAL
Qty: 14 TABLET | Refills: 0 | Status: SHIPPED | OUTPATIENT
Start: 2021-12-15 | End: 2022-02-01 | Stop reason: SDUPTHER

## 2021-12-15 NOTE — TELEPHONE ENCOUNTER
E-scribe request for med refills. Please review and e-scribe if applicable.      Last Visit Date:  09/30/2021  Next Visit Date:  Visit date not found    Hemoglobin A1C (%)   Date Value   06/14/2020 5.6             ( goal A1C is < 7)   No results found for: LABMICR  LDL Cholesterol (mg/dL)   Date Value   07/22/2020 70       (goal LDL is <100)   AST (U/L)   Date Value   11/30/2020 15     ALT (U/L)   Date Value   11/30/2020 12     BUN (mg/dL)   Date Value   10/14/2021 7     BP Readings from Last 3 Encounters:   10/14/21 (!) 156/95   09/16/21 (!) 160/90   03/30/21 (!) 159/96          (goal 120/80)        Patient Active Problem List:     Psychosis ([address]     Hypokalemia     Iron deficiency anemia secondary to inadequate dietary iron intake     Paranoid delusion ([address]     Essential hypertension     Chest pain     STEMI involving left anterior descending coronary artery ([address]     Coronary artery disease involving native coronary artery of native heart without angina pectoris     Dyslipidemia, goal LDL below 70     Class 1 obesity with serious comorbidity and body mass index (BMI) of 34.0 to 34.9 in adult     Chronic left shoulder pain     Chronic foot pain, left     Trichomonosis     Thickened endometrium     Ego-dystonic lesbianism     History of palpitations     Anxiety      ----Artem Dagmar

## 2022-02-01 DIAGNOSIS — I10 ESSENTIAL HYPERTENSION: ICD-10-CM

## 2022-02-01 RX ORDER — AMLODIPINE BESYLATE 5 MG/1
TABLET ORAL
Qty: 30 TABLET | Refills: 5 | Status: SHIPPED | OUTPATIENT
Start: 2022-02-01

## 2022-02-01 RX ORDER — LISINOPRIL 10 MG/1
TABLET ORAL
Qty: 30 TABLET | Refills: 5 | Status: SHIPPED | OUTPATIENT
Start: 2022-02-01 | End: 2022-09-23

## 2022-02-06 ENCOUNTER — HOSPITAL ENCOUNTER (EMERGENCY)
Age: 42
Discharge: HOME | End: 2022-02-06
Attending: EMERGENCY MEDICINE
Payer: MEDICARE

## 2022-02-06 ENCOUNTER — APPOINTMENT (OUTPATIENT)
Dept: GENERAL RADIOLOGY | Age: 42
End: 2022-02-06
Payer: MEDICARE

## 2022-02-06 ENCOUNTER — HOSPITAL ENCOUNTER (EMERGENCY)
Age: 42
Discharge: HOME | End: 2022-02-07
Attending: EMERGENCY MEDICINE
Payer: MEDICARE

## 2022-02-06 VITALS
DIASTOLIC BLOOD PRESSURE: 116 MMHG | OXYGEN SATURATION: 98 % | SYSTOLIC BLOOD PRESSURE: 192 MMHG | HEART RATE: 102 BPM | RESPIRATION RATE: 16 BRPM | TEMPERATURE: 97.3 F

## 2022-02-06 DIAGNOSIS — R00.2 PALPITATIONS: Primary | ICD-10-CM

## 2022-02-06 DIAGNOSIS — F41.1 ANXIETY STATE: Primary | ICD-10-CM

## 2022-02-06 LAB
ABSOLUTE EOS #: 0.13 K/UL (ref 0–0.44)
ABSOLUTE IMMATURE GRANULOCYTE: <0.03 K/UL (ref 0–0.3)
ABSOLUTE LYMPH #: 3.11 K/UL (ref 1.1–3.7)
ABSOLUTE MONO #: 0.53 K/UL (ref 0.1–1.2)
ALBUMIN SERPL-MCNC: 4.1 G/DL (ref 3.5–5.2)
ALBUMIN/GLOBULIN RATIO: 1.1 (ref 1–2.5)
ALP BLD-CCNC: 106 U/L (ref 35–104)
ALT SERPL-CCNC: 17 U/L (ref 5–33)
ANION GAP SERPL CALCULATED.3IONS-SCNC: 14 MMOL/L (ref 9–17)
AST SERPL-CCNC: 17 U/L
BASOPHILS # BLD: 0 % (ref 0–2)
BASOPHILS ABSOLUTE: 0.03 K/UL (ref 0–0.2)
BILIRUB SERPL-MCNC: 0.19 MG/DL (ref 0.3–1.2)
BNP INTERPRETATION: NORMAL
BUN BLDV-MCNC: 8 MG/DL (ref 6–20)
BUN/CREAT BLD: ABNORMAL (ref 9–20)
CALCIUM SERPL-MCNC: 8.7 MG/DL (ref 8.6–10.4)
CHLORIDE BLD-SCNC: 101 MMOL/L (ref 98–107)
CO2: 20 MMOL/L (ref 20–31)
CREAT SERPL-MCNC: 0.8 MG/DL (ref 0.5–0.9)
DIFFERENTIAL TYPE: ABNORMAL
EOSINOPHILS RELATIVE PERCENT: 2 % (ref 1–4)
GFR AFRICAN AMERICAN: >60 ML/MIN
GFR NON-AFRICAN AMERICAN: >60 ML/MIN
GFR SERPL CREATININE-BSD FRML MDRD: ABNORMAL ML/MIN/{1.73_M2}
GFR SERPL CREATININE-BSD FRML MDRD: ABNORMAL ML/MIN/{1.73_M2}
GLUCOSE BLD-MCNC: 164 MG/DL (ref 70–99)
HCG QUALITATIVE: NEGATIVE
HCT VFR BLD CALC: 36.9 % (ref 36.3–47.1)
HEMOGLOBIN: 11.7 G/DL (ref 11.9–15.1)
IMMATURE GRANULOCYTES: 0 %
LYMPHOCYTES # BLD: 38 % (ref 24–43)
MAGNESIUM: 1.9 MG/DL (ref 1.6–2.6)
MCH RBC QN AUTO: 28.1 PG (ref 25.2–33.5)
MCHC RBC AUTO-ENTMCNC: 31.7 G/DL (ref 28.4–34.8)
MCV RBC AUTO: 88.7 FL (ref 82.6–102.9)
MONOCYTES # BLD: 7 % (ref 3–12)
NRBC AUTOMATED: 0 PER 100 WBC
PDW BLD-RTO: 15.6 % (ref 11.8–14.4)
PHOSPHORUS: 3.9 MG/DL (ref 2.6–4.5)
PLATELET # BLD: 261 K/UL (ref 138–453)
PLATELET ESTIMATE: ABNORMAL
PMV BLD AUTO: 10.5 FL (ref 8.1–13.5)
POTASSIUM SERPL-SCNC: 3.3 MMOL/L (ref 3.7–5.3)
PRO-BNP: 26 PG/ML
RBC # BLD: 4.16 M/UL (ref 3.95–5.11)
RBC # BLD: ABNORMAL 10*6/UL
SEG NEUTROPHILS: 53 % (ref 36–65)
SEGMENTED NEUTROPHILS ABSOLUTE COUNT: 4.29 K/UL (ref 1.5–8.1)
SODIUM BLD-SCNC: 135 MMOL/L (ref 135–144)
TOTAL PROTEIN: 7.9 G/DL (ref 6.4–8.3)
TROPONIN INTERP: NORMAL
TROPONIN T: NORMAL NG/ML
TROPONIN, HIGH SENSITIVITY: <6 NG/L (ref 0–14)
TSH SERPL DL<=0.05 MIU/L-ACNC: 1.13 MIU/L (ref 0.3–5)
WBC # BLD: 8.1 K/UL (ref 3.5–11.3)
WBC # BLD: ABNORMAL 10*3/UL

## 2022-02-06 PROCEDURE — 96360 HYDRATION IV INFUSION INIT: CPT

## 2022-02-06 PROCEDURE — C1894 INTRO/SHEATH, NON-LASER: HCPCS

## 2022-02-06 PROCEDURE — 96361 HYDRATE IV INFUSION ADD-ON: CPT

## 2022-02-06 PROCEDURE — 93005 ELECTROCARDIOGRAM TRACING: CPT | Performed by: EMERGENCY MEDICINE

## 2022-02-06 PROCEDURE — 83735 ASSAY OF MAGNESIUM: CPT

## 2022-02-06 PROCEDURE — 2709999900 HC NON-CHARGEABLE SUPPLY

## 2022-02-06 PROCEDURE — 80053 COMPREHEN METABOLIC PANEL: CPT

## 2022-02-06 PROCEDURE — 6370000000 HC RX 637 (ALT 250 FOR IP): Performed by: STUDENT IN AN ORGANIZED HEALTH CARE EDUCATION/TRAINING PROGRAM

## 2022-02-06 PROCEDURE — 84703 CHORIONIC GONADOTROPIN ASSAY: CPT

## 2022-02-06 PROCEDURE — 83880 ASSAY OF NATRIURETIC PEPTIDE: CPT

## 2022-02-06 PROCEDURE — 99282 EMERGENCY DEPT VISIT SF MDM: CPT

## 2022-02-06 PROCEDURE — 71045 X-RAY EXAM CHEST 1 VIEW: CPT

## 2022-02-06 PROCEDURE — 85025 COMPLETE CBC W/AUTO DIFF WBC: CPT

## 2022-02-06 PROCEDURE — 2580000003 HC RX 258: Performed by: STUDENT IN AN ORGANIZED HEALTH CARE EDUCATION/TRAINING PROGRAM

## 2022-02-06 PROCEDURE — 84100 ASSAY OF PHOSPHORUS: CPT

## 2022-02-06 PROCEDURE — 84443 ASSAY THYROID STIM HORMONE: CPT

## 2022-02-06 PROCEDURE — 99283 EMERGENCY DEPT VISIT LOW MDM: CPT

## 2022-02-06 PROCEDURE — 84484 ASSAY OF TROPONIN QUANT: CPT

## 2022-02-06 RX ORDER — HYDROXYZINE HYDROCHLORIDE 25 MG/1
25 TABLET, FILM COATED ORAL ONCE
Status: DISCONTINUED | OUTPATIENT
Start: 2022-02-06 | End: 2022-02-06 | Stop reason: HOSPADM

## 2022-02-06 RX ORDER — 0.9 % SODIUM CHLORIDE 0.9 %
1000 INTRAVENOUS SOLUTION INTRAVENOUS ONCE
Status: COMPLETED | OUTPATIENT
Start: 2022-02-06 | End: 2022-02-07

## 2022-02-06 RX ORDER — POTASSIUM CHLORIDE 20 MEQ/1
40 TABLET, EXTENDED RELEASE ORAL ONCE
Status: COMPLETED | OUTPATIENT
Start: 2022-02-06 | End: 2022-02-06

## 2022-02-06 RX ADMIN — SODIUM CHLORIDE 1000 ML: 9 INJECTION, SOLUTION INTRAVENOUS at 23:19

## 2022-02-06 RX ADMIN — POTASSIUM CHLORIDE 40 MEQ: 20 TABLET, EXTENDED RELEASE ORAL at 22:56

## 2022-02-06 ASSESSMENT — PAIN DESCRIPTION - PROGRESSION: CLINICAL_PROGRESSION: GRADUALLY WORSENING

## 2022-02-06 ASSESSMENT — PAIN DESCRIPTION - PAIN TYPE: TYPE: ACUTE PAIN

## 2022-02-06 ASSESSMENT — ENCOUNTER SYMPTOMS
SHORTNESS OF BREATH: 0
ABDOMINAL PAIN: 0
PHOTOPHOBIA: 0

## 2022-02-06 ASSESSMENT — PAIN DESCRIPTION - LOCATION: LOCATION: CHEST

## 2022-02-06 ASSESSMENT — PAIN DESCRIPTION - FREQUENCY: FREQUENCY: INTERMITTENT

## 2022-02-06 ASSESSMENT — PAIN DESCRIPTION - DESCRIPTORS: DESCRIPTORS: OTHER (COMMENT)

## 2022-02-06 ASSESSMENT — PAIN DESCRIPTION - ONSET: ONSET: ON-GOING

## 2022-02-06 ASSESSMENT — PAIN SCALES - GENERAL: PAINLEVEL_OUTOF10: 5

## 2022-02-07 ENCOUNTER — APPOINTMENT (OUTPATIENT)
Dept: CT IMAGING | Age: 42
End: 2022-02-07
Payer: MEDICARE

## 2022-02-07 VITALS
BODY MASS INDEX: 37.76 KG/M2 | RESPIRATION RATE: 22 BRPM | WEIGHT: 220 LBS | DIASTOLIC BLOOD PRESSURE: 86 MMHG | SYSTOLIC BLOOD PRESSURE: 150 MMHG | OXYGEN SATURATION: 100 % | HEART RATE: 83 BPM | TEMPERATURE: 98.1 F

## 2022-02-07 LAB
ALLEN TEST: ABNORMAL
CARBOXYHEMOGLOBIN: 4.6 % (ref 0–5)
D-DIMER QUANTITATIVE: 0.61 MG/L FEU
EKG ATRIAL RATE: 139 BPM
EKG ATRIAL RATE: 90 BPM
EKG P AXIS: 52 DEGREES
EKG P-R INTERVAL: 120 MS
EKG P-R INTERVAL: 198 MS
EKG Q-T INTERVAL: 322 MS
EKG Q-T INTERVAL: 382 MS
EKG QRS DURATION: 84 MS
EKG QRS DURATION: 84 MS
EKG QTC CALCULATION (BAZETT): 467 MS
EKG QTC CALCULATION (BAZETT): 489 MS
EKG R AXIS: 24 DEGREES
EKG R AXIS: 29 DEGREES
EKG T AXIS: 24 DEGREES
EKG T AXIS: 31 DEGREES
EKG VENTRICULAR RATE: 139 BPM
EKG VENTRICULAR RATE: 90 BPM
FIO2: ABNORMAL
HCO3 VENOUS: 23.9 MMOL/L (ref 24–30)
METHEMOGLOBIN: ABNORMAL % (ref 0–1.5)
MODE: ABNORMAL
NEGATIVE BASE EXCESS, VEN: 1.6 MMOL/L (ref 0–2)
NOTIFICATION TIME: ABNORMAL
NOTIFICATION: ABNORMAL
O2 DEVICE/FLOW/%: ABNORMAL
O2 SAT, VEN: 90.7 % (ref 60–85)
OXYHEMOGLOBIN: ABNORMAL % (ref 95–98)
PATIENT TEMP: 37
PCO2, VEN, TEMP ADJ: ABNORMAL MMHG (ref 39–55)
PCO2, VEN: 45.8 (ref 39–55)
PEEP/CPAP: ABNORMAL
PH VENOUS: 7.34 (ref 7.32–7.42)
PH, VEN, TEMP ADJ: ABNORMAL (ref 7.32–7.42)
PO2, VEN, TEMP ADJ: ABNORMAL MMHG (ref 30–50)
PO2, VEN: 62.1 (ref 30–50)
POSITIVE BASE EXCESS, VEN: ABNORMAL MMOL/L (ref 0–2)
PSV: ABNORMAL
PT. POSITION: ABNORMAL
RESPIRATORY RATE: ABNORMAL
SAMPLE SITE: ABNORMAL
SET RATE: ABNORMAL
TEXT FOR RESPIRATORY: ABNORMAL
TOTAL HB: ABNORMAL G/DL (ref 12–16)
TOTAL RATE: ABNORMAL
TROPONIN INTERP: NORMAL
TROPONIN T: NORMAL NG/ML
TROPONIN, HIGH SENSITIVITY: <6 NG/L (ref 0–14)
VT: ABNORMAL

## 2022-02-07 PROCEDURE — 93010 ELECTROCARDIOGRAM REPORT: CPT | Performed by: INTERNAL MEDICINE

## 2022-02-07 PROCEDURE — 71260 CT THORAX DX C+: CPT

## 2022-02-07 PROCEDURE — 6360000004 HC RX CONTRAST MEDICATION: Performed by: EMERGENCY MEDICINE

## 2022-02-07 PROCEDURE — 2580000003 HC RX 258: Performed by: STUDENT IN AN ORGANIZED HEALTH CARE EDUCATION/TRAINING PROGRAM

## 2022-02-07 PROCEDURE — 84484 ASSAY OF TROPONIN QUANT: CPT

## 2022-02-07 PROCEDURE — 82805 BLOOD GASES W/O2 SATURATION: CPT

## 2022-02-07 PROCEDURE — 36415 COLL VENOUS BLD VENIPUNCTURE: CPT

## 2022-02-07 PROCEDURE — 85379 FIBRIN DEGRADATION QUANT: CPT

## 2022-02-07 RX ORDER — SODIUM CHLORIDE, SODIUM LACTATE, POTASSIUM CHLORIDE, AND CALCIUM CHLORIDE .6; .31; .03; .02 G/100ML; G/100ML; G/100ML; G/100ML
1000 INJECTION, SOLUTION INTRAVENOUS ONCE
Status: COMPLETED | OUTPATIENT
Start: 2022-02-07 | End: 2022-02-07

## 2022-02-07 RX ADMIN — IOPAMIDOL 85 ML: 755 INJECTION, SOLUTION INTRAVENOUS at 01:09

## 2022-02-07 RX ADMIN — SODIUM CHLORIDE, POTASSIUM CHLORIDE, SODIUM LACTATE AND CALCIUM CHLORIDE 1000 ML: 600; 310; 30; 20 INJECTION, SOLUTION INTRAVENOUS at 00:57

## 2022-02-07 ASSESSMENT — ENCOUNTER SYMPTOMS
WHEEZING: 0
TROUBLE SWALLOWING: 0
DIARRHEA: 0
SHORTNESS OF BREATH: 1
BLOOD IN STOOL: 0
STRIDOR: 0
COLOR CHANGE: 0
BACK PAIN: 0
VOMITING: 0
ABDOMINAL DISTENTION: 0
NAUSEA: 0
ABDOMINAL PAIN: 0
COUGH: 0
CHEST TIGHTNESS: 0

## 2022-02-07 NOTE — ED NOTES
Patient returned from bathroom, verbalizes she has to leave because her 15year old son locked himself out of house and she is the only one availabe to let him in  Patient verbalizes she will return  Dr. Niko Cormier in to speak with patient  Patient continues to deny chest pain     Yg Cardona RN  02/06/22 0973

## 2022-02-07 NOTE — ED NOTES
The following labs labeled with pt sticker and tubed to lab:     [] Blue     [x] Lavender   [] on ice  [] Green/yellow  [x] Green/black [] on ice  [] Yellow  [] Red  [] Pink      [] COVID-19 swab    [] Rapid  [] PCR  [] Flu swab  [] Peds Viral Panel     [] Urine Sample  [] Pelvic Cultures  [] Blood Cultures            Dina Ramachandran RN  02/07/22 1922

## 2022-02-07 NOTE — ED NOTES
Patient to room 16  Patient is a 42year old female  Patient complains of heart beating fast, denies chest pain  Patient anxious, has difficult time holding conversation, seems as if almost ready to cry every time she is going to talk  Patient placed on cardiac monitor, BP cuff and pulse ox. Alarms set.   Dr. Rosario Rodas and Dr. Seng Brown at bedside  NAD  Will continue to assess  Siderails up x2, blankets on patient       Nati Shukla RN  02/06/22 0388

## 2022-02-07 NOTE — ED NOTES
Dr. Montes Cluck  in to update patient and answer all questions  Discharge instructions given. Verbalized understanding. All questions answered.          Bryan Saravia RN  02/07/22 2769

## 2022-02-07 NOTE — ED PROVIDER NOTES
[facility]     Emergency Department     Faculty Note/ Attestation      Pt Name: Dennis Merlos                                       MRN: [id number]  Johngfbrittany 1980  Date of evaluation: 2/6/2022    Patients PCP:    Roxanne Healy MD    Attestation  I performed a history and physical examination of the patient and discussed management with the resident. I reviewed the residents note and agree with the documented findings and plan of care. Any areas of disagreement are noted on the chart. I was personally present for the key portions of any procedures. I have documented in the chart those procedures where I was not present during the key portions. I have reviewed the emergency nurses triage note. I agree with the chief complaint, past medical history, past surgical history, allergies, medications, social and family history as documented unless otherwise noted below. For Physician Assistant/ Nurse Practitioner cases/documentation I have personally evaluated this patient and have completed at least one if not all key elements of the E/M (history, physical exam, and MDM). Additional findings are as noted. Initial Screens:             Vitals:    Vitals:    02/06/22 2117 02/06/22 2119   BP:  (!) 182/116   Pulse: 133    Resp: 24    Temp: 97.3 °F (36.3 °C)    TempSrc: Oral    SpO2: 99%        CHIEF COMPLAINT       Chief Complaint   Patient presents with    Palpitations    Anxiety       The pt is a 43 YO F who is having palpitations. She had an episode that caused her to become extremely angry. The pt has hx of cardiac issues and with the palptiations she is having palptiations. No SOB or exertional sx.         EMERGENCY DEPARTMENT COURSE:     -------------------------  BP: (!) 182/116, Temp: 97.3 °F (36.3 °C), Pulse: 133, Resp: 24  Patient is ambulatory walking around the department no acute distress no respiratory distress no exertional dyspnea  Pt alert and oriented    Comments      ED Course as of 02/06/22 2211   Kimberly Pitcher Feb 06, 2022 2127 Pt refusing atarax [BG]   2138 Xr reviewed unremarkable [BG]   2150 EKG Interpretation   Interpreted by Annabelle Martínez DO    Rhythm: normal sinus   Rate: normal  Axis: normal  Ectopy: none  Conduction: normal  ST Segments: normal  T Waves: normal  Q Waves: non specific    Clinical Impression: sinus tachycardia  [WK]   2209 Pt states her son is locked out of her home and needs to leave will return.  [BG]      ED Course User Index  [BG] Maria G Barton DO  [WK] DO Annabelle Lane DO,, DO, RDMS.   Attending Emergency Physician          Annabelle Martínez DO  02/06/22 2211

## 2022-02-07 NOTE — ED NOTES
Patient heart rate decreased to 102 while talking with this nurse  Patient denies chest pain, but verbalizes felt heart was beating fast  Patient is very anxious, almost tearful, has difficult time holding conversation     Ata Price RN  02/06/22 8955

## 2022-02-07 NOTE — ED PROVIDER NOTES
[facility]  Emergency Department  Faculty Attestation     I performed a history and physical examination of the patient and discussed management with the resident. I reviewed the residents note and agree with the documented findings and plan of care. Any areas of disagreement are noted on the chart. I was personally present for the key portions of any procedures. I have documented in the chart those procedures where I was not present during the key portions. I have reviewed the emergency nurses triage note. I agree with the chief complaint, past medical history, past surgical history, allergies, medications, social and family history as documented unless otherwise noted below. For Physician Assistant/ Nurse Practitioner cases/documentation I have personally evaluated this patient and have completed at least one if not all key elements of the E/M (history, physical exam, and MDM). Additional findings are as noted. Primary Care Physician:  Dalia Mcmillan MD    [facility]:  [facility] [address]       Chief Complaint   Patient presents with    Tachycardia     Patient was to get a halter monitor approx 3 mo ago and has not had one,  Patient was here earlier for same complaint but had to leave due to a family emergency       RECENT VITALS:   Temp: 98.1 °F (36.7 °C),   ,  ,      LABS:  Labs Reviewed   TROPONIN   D-DIMER, QUANTITATIVE       Radiology  No orders to display       CRITICAL CARE: There was a high probability of clinically significant/life threatening deterioration in this patient's condition which required my urgent intervention. Total critical care time was none minutes. This excludes any time for separately reportable procedures.      EKG:   EKG Interpretation    Interpreted by me    Rhythm: normal sinus   Rate: normal  Axis: normal  Ectopy: none  Conduction: normal  ST Segments: no acute change  T Waves: no acute change  Q Waves: none    Clinical

## 2022-02-07 NOTE — ED NOTES
Patient verbalizes a need to void  But then patient again tries to have conversation and continues to be almost very tearful   SW and  offered to patient  Denies need to speak with either  Patient up to bathroom     Roberto Carlos Mckee RN  02/06/22 2318

## 2022-02-07 NOTE — ED NOTES
Patient resting on cot, alert, oriented, no distress noted, rr even and non labored. Patient updated on plan of care and duration, pt denies needs, will continue to monitor, call light within reach.        [facility]  02/07/22 3077

## 2022-02-07 NOTE — ED NOTES
Discharge instructions given. Verbalized understanding. All questions answered.   Patient leaves without waiting for paperwork     Darell Richard RN  02/06/22 2866

## 2022-02-07 NOTE — ED NOTES
The following labs labeled with pt sticker and tubed to lab: by me  [x] Blue     [] Jaiden Balk   [] on ice  [x] Green/yellow  [] Green/black [] on ice  [] Yellow  [] Red  [] Pink      [] COVID-19 swab    [] Rapid  [] PCR  [] Flu swab  [] Peds Viral Panel     [] Urine Sample  [] Pelvic Cultures  [] Blood Cultures            Efrain Roy RN  02/07/22 000

## 2022-02-07 NOTE — ED PROVIDER NOTES
[facility]  ED  Emergency Department Encounter  EmergencyMedicine Resident     Pt Name:Shelia Gonzales  MRN: [id number]  [place] 1980  Date of evaluation: 2/6/22  PCP:  Александр Sims MD    This patient was evaluated in the Emergency Department for symptoms described in the history of present illness. The patient was evaluated in the context of the global COVID-19 pandemic, which necessitated consideration that the patient might be at risk for infection with the SARS-CoV-2 virus that causes COVID-19. Institutional protocols and algorithms that pertain to the evaluation of patients at risk for COVID-19 are in a state of rapid change based on information released by regulatory bodies including the CDC and federal and state organizations. These policies and algorithms were followed during the patient's care in the ED. CHIEF COMPLAINT       Chief Complaint   Patient presents with    Tachycardia     Patient was to get a halter monitor approx 3 mo ago and has not had one,  Patient was here earlier for same complaint but had to leave due to a family emergency       HISTORY OF PRESENT ILLNESS  (Location/Symptom, Timing/Onset, Context/Setting, Quality, Duration, Modifying Factors, Severity.)      Hakan Mcfarland is a 42 y.o. female who presents with anxious state and fast heart rate. Patient was seen about 1 hour ago and had to leave the hospital to help with the family issue. Patient upon presentation still has a fast heart rate however does not experience any chest pain or shortness of breath. No fevers chills nausea vomiting dizziness drowsiness or constitutional symptoms. Patient is otherwise doing well, she is on 3 antihypertensive medications, she is on Brilinta, however she is not experiencing any episodes of bleeding, no other concerning signs or symptoms. PAST MEDICAL / SURGICAL / SOCIAL / FAMILY HISTORY      has a past medical history of Anxiety and Hypertension.        has a past surgical history that includes  section; Carpal tunnel release; Achilles tendon surgery; and Cardiac surgery (2020). Social History     Socioeconomic History    Marital status: Single     Spouse name: Not on file    Number of children: Not on file    Years of education: Not on file    Highest education level: Not on file   Occupational History    Not on file   Tobacco Use    Smoking status: Light Tobacco Smoker     Packs/day: 0.50     Types: Cigarettes     Last attempt to quit: 2020     Years since quittin.5    Smokeless tobacco: Never Used   Substance and Sexual Activity    Alcohol use: Yes     Comment: social    Drug use: Yes     Types: Marijuana Olivia Coho)    Sexual activity: Not Currently   Other Topics Concern    Not on file   Social History Narrative    Not on file     Social Determinants of Health     Financial Resource Strain:     Difficulty of Paying Living Expenses: Not on file   Food Insecurity:     Worried About Running Out of Food in the Last Year: Not on file    Jessie of Food in the Last Year: Not on file   Transportation Needs:     Lack of Transportation (Medical): Not on file    Lack of Transportation (Non-Medical):  Not on file   Physical Activity:     Days of Exercise per Week: Not on file    Minutes of Exercise per Session: Not on file   Stress:     Feeling of Stress : Not on file   Social Connections:     Frequency of Communication with Friends and Family: Not on file    Frequency of Social Gatherings with Friends and Family: Not on file    Attends Jewish Services: Not on file    Active Member of Clubs or Organizations: Not on file    Attends Club or Organization Meetings: Not on file    Marital Status: Not on file   Intimate Partner Violence:     Fear of Current or Ex-Partner: Not on file    Emotionally Abused: Not on file    Physically Abused: Not on file    Sexually Abused: Not on file   Housing Stability:     Unable to Pay for Housing in the Last Year: Not on file    Number of Places Lived in the Last Year: Not on file    Unstable Housing in the Last Year: Not on file       Family History   Problem Relation Age of Onset    Liver Disease Mother     Cancer Father         bowel    Sickle Cell Anemia Sister     Asthma Son        Allergies:  Latex    Home Medications:  Prior to Admission medications    Medication Sig Start Date End Date Taking? Authorizing Provider   amLODIPine (NORVASC) 5 MG tablet take 1 tablet by mouth once daily 2/1/22   Maria Del Carmen Bowie MD   lisinopril (PRINIVIL;ZESTRIL) 10 MG tablet take 1 tablet by mouth once daily 2/1/22   Maria Del Carmen Bowie MD   metoprolol tartrate (LOPRESSOR) 25 MG tablet Take 1 tablet by mouth 2 times daily 2/1/22   Maria Del Carmen Bowie MD   nicotine (NICODERM CQ) 7 MG/24HR Place 1 patch onto the skin every 24 hours 9/16/21 9/16/22  Roxanne Healy MD   citalopram (CELEXA) 10 MG tablet Take 1 tablet by mouth daily 9/16/21   Roxanne Healy MD   diclofenac sodium (VOLTAREN) 1 % GEL Apply 4 g topically 4 times daily 9/29/20   Torsten Tay MD   aspirin 81 MG EC tablet Take 1 tablet by mouth daily 7/24/20   CARISSA Peña NP   ticagrelor (BRILINTA) 90 MG TABS tablet Take 1 tablet by mouth 2 times daily 7/23/20   CARISSA Peña NP   nitroGLYCERIN (NITROSTAT) 0.4 MG SL tablet Place 1 tablet under the tongue every 5 minutes as needed for Chest pain up to max of 3 total doses. If no relief after 1 dose, call 911. 7/23/20   CARISSA Peña NP   atorvastatin (LIPITOR) 40 MG tablet Take 1 tablet by mouth nightly 7/23/20   CARISSA Peña NP       REVIEW OF SYSTEMS    (2-9 systems for level 4, 10 or more for level 5)      Review of Systems   Constitutional: Negative for chills, fatigue and fever. HENT: Negative for congestion and trouble swallowing. Eyes: Negative for visual disturbance. Respiratory: Positive for shortness of breath.  Negative for cough, chest tightness, wheezing and stridor. Cardiovascular: Positive for palpitations. Gastrointestinal: Negative for abdominal distention, abdominal pain, blood in stool, diarrhea, nausea and vomiting. Genitourinary: Negative for dysuria, flank pain, hematuria and urgency. Musculoskeletal: Negative for back pain and myalgias. Skin: Negative for color change. Neurological: Negative for dizziness, syncope, weakness, light-headedness and headaches. PHYSICAL EXAM   (up to 7 for level 4, 8 or more for level 5)      INITIAL VITALS:   BP (!) 150/86   Pulse 83   Temp 98.1 °F (36.7 °C) (Oral)   Resp 22   Wt 220 lb (99.8 kg)   SpO2 100%   BMI 37.76 kg/m²     Physical Exam  Constitutional:       General: She is not in acute distress. Appearance: Normal appearance. She is normal weight. She is not ill-appearing, toxic-appearing or diaphoretic. HENT:      Head: Normocephalic and atraumatic. Nose: No congestion. Mouth/Throat:      Mouth: Mucous membranes are moist.   Eyes:      Conjunctiva/sclera: Conjunctivae normal.   Cardiovascular:      Rate and Rhythm: Regular rhythm. Tachycardia present. Pulses: Normal pulses. Heart sounds: Normal heart sounds. No murmur heard. No gallop. Pulmonary:      Effort: Pulmonary effort is normal. No respiratory distress. Breath sounds: Normal breath sounds. No stridor. No wheezing or rhonchi. Chest:      Chest wall: No tenderness. Abdominal:      General: Abdomen is flat. There is no distension. Palpations: There is no mass. Tenderness: There is no abdominal tenderness. There is no guarding or rebound. Musculoskeletal:         General: No swelling, tenderness or deformity. Skin:     General: Skin is warm. Coloration: Skin is not jaundiced. Findings: No bruising. Neurological:      General: No focal deficit present. Mental Status: She is alert.          DIFFERENTIAL  DIAGNOSIS     PLAN (Louis Johnson / Dimitri Manual / EKG):  Orders Placed This Encounter   Procedures    CT CHEST PULMONARY EMBOLISM W CONTRAST    Troponin    D-Dimer, Quantitative    BLOOD GAS, VENOUS       MEDICATIONS ORDERED:  Orders Placed This Encounter   Medications    0.9 % sodium chloride bolus    potassium chloride (KLOR-CON M) extended release tablet 40 mEq    lactated ringers bolus    iopamidol (ISOVUE-370) 76 % injection 85 mL       DDX: ACS, arrhythmia, trauma, PE, PNA, pneumothroax, esophageal rupture, tamponade, pneumonia, pericarditis, GERD, MSK, Endocarditis, anxiety         DIAGNOSTIC RESULTS / EMERGENCY DEPARTMENT COURSE / MDM   LAB RESULTS:  Results for orders placed or performed during the hospital encounter of 02/06/22   Troponin   Result Value Ref Range    Troponin, High Sensitivity <6 0 - 14 ng/L    Troponin T NOT REPORTED <0.03 ng/mL    Troponin Interp NOT REPORTED    D-Dimer, Quantitative   Result Value Ref Range    D-Dimer, Quant 0.61 mg/L FEU   BLOOD GAS, VENOUS   Result Value Ref Range    pH, Inderjit 7.337 7.320 - 7.420    pCO2, Inderjit 45.8 39 - 55    pO2, Inderjit 62.1 (H) 30 - 50    HCO3, Venous 23.9 (L) 24 - 30 mmol/L    Positive Base Excess, Inderjit NOT REPORTED 0.0 - 2.0 mmol/L    Negative Base Excess, Inderjit 1.6 0.0 - 2.0 mmol/L    O2 Sat, Inderjit 90.7 (H) 60.0 - 85.0 %    Total Hb NOT REPORTED 12.0 - 16.0 g/dl    Oxyhemoglobin NOT REPORTED 95.0 - 98.0 %    Carboxyhemoglobin 4.6 0 - 5 %    Methemoglobin NOT REPORTED 0.0 - 1.5 %    Pt Temp 37.0     pH, Inderjit, Temp Adj NOT REPORTED 7.320 - 7.420    pCO2, Inderjit, Temp Adj NOT REPORTED 39 - 55 mmHg    pO2, Inderjit, Temp Adj NOT REPORTED 30 - 50 mmHg    O2 Device/Flow/% NOT REPORTED     Respiratory Rate NOT REPORTED     Clifton Test NOT REPORTED     Sample Site NOT REPORTED     Pt.  Position NOT REPORTED     Mode NOT REPORTED     Set Rate NOT REPORTED     Total Rate NOT REPORTED     VT NOT REPORTED     FIO2 UNKNOWN     Peep/Cpap NOT REPORTED     PSV NOT REPORTED     Text for Respiratory NOT REPORTED NOTIFICATION NOT REPORTED     NOTIFICATION TIME NOT REPORTED          RADIOLOGY:  XR CHEST PORTABLE    Result Date: 2/6/2022  A suboptimal inspiration limits the study. Given this limitation no acute pulmonary disease or significant change is noted. CT CHEST PULMONARY EMBOLISM W CONTRAST    Result Date: 2/7/2022  No evidence of pulmonary embolism or acute pulmonary abnormality. EKG  Sinus tachycardia    All EKG's are interpreted by the Emergency Department Physician who either signs or Co-signs this chart in the absence of a cardiologist.          Assessment/Plan: 48-year-old female presented with heart palpitations and shortness of breath, she was evaluated 1 hour ago with an unremarkable work-up. Patient's potassium was borderline at 3.3, she was given a dose of potassium and 2 L of fluids with marginal improvement of her symptoms. Vagal maneuvers to help reduce her heart rate. Patient maintaining sinus tachycardia however after a little bit time she was able to calm down heart rate was in the 80s to 90s. CT pulmonary embolism study was negative, troponins were negative, cardiac work-up was otherwise unremarkable, no evidence of acute findings on her chest x-ray. Patient was otherwise doing well, she is scheduled to be evaluated by cardiology in 2 days and have a Holter monitor evaluation. Patient is appropriate for discharge at this time, she was given ER return precautions and follow-up instructions. FINAL IMPRESSION      1. Anxiety state          DISPOSITION / PLAN     DISPOSITION Decision To Discharge 02/07/2022 01:38:19 AM      PATIENT REFERRED TO:  Leia Ramírez MD  [address]   [address]  [address]  [phone]    Go to   As needed    [facility]  [address] [facility] [address]  [phone]  Go to   As needed      DISCHARGE MEDICATIONS:  Discharge Medication List as of 2/7/2022  1:39 AM          Robyn Shankar DO  Emergency Medicine Resident    (Please note that portions of thisnote were completed with a voice recognition program.  Efforts were made to edit the dictations but occasionally words are mis-transcribed.)        Veronica Novak DO  Resident  02/07/22 [id number]

## 2022-02-16 ENCOUNTER — OFFICE VISIT (OUTPATIENT)
Dept: FAMILY MEDICINE CLINIC | Age: 42
End: 2022-02-16
Payer: MEDICARE

## 2022-02-16 VITALS
HEIGHT: 64 IN | SYSTOLIC BLOOD PRESSURE: 122 MMHG | DIASTOLIC BLOOD PRESSURE: 79 MMHG | HEART RATE: 83 BPM | WEIGHT: 234 LBS | TEMPERATURE: 97.3 F | BODY MASS INDEX: 39.95 KG/M2

## 2022-02-16 DIAGNOSIS — Z23 NEED FOR PROPHYLACTIC VACCINATION AGAINST DIPHTHERIA-TETANUS-PERTUSSIS (DTP): ICD-10-CM

## 2022-02-16 DIAGNOSIS — Z13.220 SCREENING FOR HYPERLIPIDEMIA: ICD-10-CM

## 2022-02-16 DIAGNOSIS — F41.9 ANXIETY: Primary | ICD-10-CM

## 2022-02-16 DIAGNOSIS — R00.2 PALPITATION: ICD-10-CM

## 2022-02-16 PROCEDURE — 99213 OFFICE O/P EST LOW 20 MIN: CPT

## 2022-02-16 PROCEDURE — G8484 FLU IMMUNIZE NO ADMIN: HCPCS

## 2022-02-16 PROCEDURE — G8427 DOCREV CUR MEDS BY ELIG CLIN: HCPCS

## 2022-02-16 PROCEDURE — G8417 CALC BMI ABV UP PARAM F/U: HCPCS

## 2022-02-16 PROCEDURE — 90715 TDAP VACCINE 7 YRS/> IM: CPT | Performed by: STUDENT IN AN ORGANIZED HEALTH CARE EDUCATION/TRAINING PROGRAM

## 2022-02-16 PROCEDURE — 4004F PT TOBACCO SCREEN RCVD TLK: CPT

## 2022-02-16 RX ORDER — HYDROXYZINE HYDROCHLORIDE 25 MG/1
25 TABLET, FILM COATED ORAL EVERY 8 HOURS PRN
Qty: 30 TABLET | Refills: 0 | Status: SHIPPED | OUTPATIENT
Start: 2022-02-16 | End: 2022-02-26

## 2022-02-16 ASSESSMENT — ENCOUNTER SYMPTOMS
BACK PAIN: 0
NAUSEA: 0
RHINORRHEA: 0
ABDOMINAL DISTENTION: 0
WHEEZING: 0
ABDOMINAL PAIN: 0
CONSTIPATION: 0
VOMITING: 0
SHORTNESS OF BREATH: 1
DIARRHEA: 0
COUGH: 0
SORE THROAT: 0

## 2022-02-16 ASSESSMENT — PATIENT HEALTH QUESTIONNAIRE - PHQ9
SUM OF ALL RESPONSES TO PHQ QUESTIONS 1-9: 0
SUM OF ALL RESPONSES TO PHQ9 QUESTIONS 1 & 2: 0
SUM OF ALL RESPONSES TO PHQ QUESTIONS 1-9: 0
1. LITTLE INTEREST OR PLEASURE IN DOING THINGS: 0
2. FEELING DOWN, DEPRESSED OR HOPELESS: 0
SUM OF ALL RESPONSES TO PHQ QUESTIONS 1-9: 0
SUM OF ALL RESPONSES TO PHQ QUESTIONS 1-9: 0

## 2022-02-16 NOTE — PROGRESS NOTES
Writer didn't check out patient nor give any orders or AVS,  [facility] checked out patient.

## 2022-02-16 NOTE — PATIENT INSTRUCTIONS
Thank you for letting us take care of you today. We hope all your questions were addressed. If a question was overlooked or something else comes to mind after you return home, please contact a member of your Care Team listed below. Your Care Team at [place] [address] is Team #4  Nory Nick MD (Faculty)  Michael Bernardo MD (Resident)  Zachery Bernabe MD (Resident)  Silvana Casas MD (Resident)  Leia Ramírez MD (Resident)  MARTA Head., NICHOLAS Coe., Ruth Willard., [facility] office)  Gisselle Degroot (Clinical Practice Manager)  Jd Lockett [facility] (Clinical Pharmacist)       Office phone number: [phone]    If you need to get in right away due to illness, please be advised we have \"Same Day\" appointments available Monday-Friday. Please call us at [phone] option #3 to schedule your \"Same Day\" appointment. Patient Education        Tdap (Tetanus, Diphtheria, Pertussis) Vaccine: What You Need to Know  Why get vaccinated? Tdap vaccine can prevent tetanus, diphtheria, and pertussis. Diphtheria and pertussis spread from person to person. Tetanus enters the body through cuts or wounds. · TETANUS (T) causes painful stiffening of the muscles. Tetanus can lead to serious health problems, including being unable to open the mouth, having trouble swallowing and breathing, or death. · DIPHTHERIA (D) can lead to difficulty breathing, heart failure, paralysis, or death. · PERTUSSIS (aP), also known as \"whooping cough,\" can cause uncontrollable, violent coughing that makes it hard to breathe, eat, or drink. Pertussis can be extremely serious especially in babies and young children, causing pneumonia, convulsions, brain damage, or death. In teens and adults, it can cause weight loss, loss of bladder control, passing out, and rib fractures from severe coughing. Tdap vaccine  Tdap is only for children 7 years and older, adolescents, and adults.   Adolescents should receive a single dose of Tdap, preferably at age 6 or 15 years. Pregnant people should get a dose of Tdap during every pregnancy, preferably during the early part of the third trimester, to help protect the  from pertussis. Infants are most at risk for severe, life-threatening complications from pertussis. Adults who have never received Tdap should get a dose of Tdap. Also, adults should receive a booster dose of either Tdap or Td (a different vaccine that protects against tetanus and diphtheria but not pertussis) every 10 years, or after 5 years in the case of a severe or dirty wound or burn. Tdap may be given at the same time as other vaccines. Talk with your health care provider  Tell your vaccination provider if the person getting the vaccine:  · Has had an allergic reaction after a previous dose of any vaccine that protects against tetanus, diphtheria, or pertussis, or has any severe, life-threatening allergies  · Has had a coma, decreased level of consciousness, or prolonged seizures within 7 days after a previous dose of any pertussis vaccine (DTP, DTaP, or Tdap)  · Has seizures or another nervous system problem  · Has ever had Guillain-Barré Syndrome (also called \"GBS\")  · Has had severe pain or swelling after a previous dose of any vaccine that protects against tetanus or diphtheria  In some cases, your health care provider may decide to postpone Tdap vaccination until a future visit. People with minor illnesses, such as a cold, may be vaccinated. People who are moderately or severely ill should usually wait until they recover before getting Tdap vaccine. Your health care provider can give you more information. Risks of a vaccine reaction  · Pain, redness, or swelling where the shot was given, mild fever, headache, feeling tired, and nausea, vomiting, diarrhea, or stomachache sometimes happen after Tdap vaccination. People sometimes faint after medical procedures, including vaccination. Tell your provider if you feel dizzy or have vision changes or ringing in the ears. As with any medicine, there is a very remote chance of a vaccine causing a severe allergic reaction, other serious injury, or death. What if there is a serious problem? An allergic reaction could occur after the vaccinated person leaves the clinic. If you see signs of a severe allergic reaction (hives, swelling of the face and throat, difficulty breathing, a fast heartbeat, dizziness, or weakness), call 9-1-1 and get the person to the nearest hospital.  For other signs that concern you, call your health care provider. Adverse reactions should be reported to the Vaccine Adverse Event Reporting System (VAERS). Your health care provider will usually file this report, or you can do it yourself. Visit the VAERS website at www.vaers. [facility].gov or call 2-959.758.2303. VAERS is only for reporting reactions, and VAERS staff members do not give medical advice. The National Vaccine Injury Compensation Program  The National Vaccine Injury Compensation Program (VICP) is a federal program that was created to compensate people who may have been injured by certain vaccines. Claims regarding alleged injury or death due to vaccination have a time limit for filing, which may be as short as two years. Visit the VICP website at www.hrsa.gov/vaccinecompensation or call 9-305.782.3201 to learn about the program and about filing a claim. How can I learn more? · Ask your health care provider. · Call your local or state health department. · Visit the website of the Food and Drug Administration (FDA) for vaccine package inserts and additional information at www.fda.gov/vaccines-blood-biologics/vaccines. · Contact the Centers for Disease Control and Prevention (CDC):  ? Call 7-363.611.8685 (1-800-CDC-INFO) or  ? Visit CDC's website at www.cdc.gov/vaccines. Vaccine Information Statement  Tdap (Tetanus, Diphtheria, Pertussis) Vaccine  8/6/2021  [employer] [place]

## 2022-02-16 NOTE — PROGRESS NOTES
Attending Physician Statement  I have discussed the care of Shelia Mccormick 42 y.o. female, including pertinent history and exam findings, with the resident Dr. Ilya Brown MD.    History and Exam:   Chief Complaint   Patient presents with    Results     MRI and lab     Discuss Medications     potassium options        Past Medical History:   Diagnosis Date    Anxiety     Hypertension      Allergies   Allergen Reactions    Latex       I have seen and examined the patient and the key elements of the encounter have been performed by me. BP Readings from Last 3 Encounters:   02/16/22 122/79   02/07/22 (!) 150/86   02/06/22 (!) 192/116     /79 (Site: Right Upper Arm, Position: Sitting, Cuff Size: Large Adult) Comment: machine  Pulse 83   Temp 97.3 °F (36.3 °C) (Temporal)   Ht 5' 4\" (1.626 m)   Wt 234 lb (106.1 kg)   LMP 02/05/2022   BMI 40.17 kg/m²   Lab Results   Component Value Date    WBC 8.1 02/06/2022    HGB 11.7 (L) 02/06/2022    HCT 36.9 02/06/2022     02/06/2022    CHOL 129 07/22/2020    TRIG 68 07/22/2020    HDL 45 07/22/2020    ALT 17 02/06/2022    AST 17 02/06/2022     02/06/2022    K 3.3 (L) 02/06/2022     02/06/2022    CREATININE 0.80 02/06/2022    BUN 8 02/06/2022    CO2 20 02/06/2022    TSH 1.13 02/06/2022    INR 1.0 09/22/2020    LABA1C 5.6 06/14/2020     Lab Results   Component Value Date    LABALBU 4.1 02/06/2022     Lab Results   Component Value Date    IRON 68 07/22/2020    TIBC 389 07/22/2020    FERRITIN 13 07/22/2020     Lab Results   Component Value Date    LDLCHOLESTEROL 70 07/22/2020     I agree with the assessment, plan and the diagnosis of    Diagnosis Orders   1. Screening for hyperlipidemia     2. Need for prophylactic vaccination against diphtheria-tetanus-pertussis (DTP)  Tdap (age 6y and older) IM (Boostrix)    . I agree with orders as documented by the resident.      More than 25 minutes spent  in face to face encounter with the patient and more than half in counseling. Patient's questions were answered. Patient Voiced understanding to the counseling. No follow-ups on file.    (GC Modifier)-Dr. Rachel Gonzalez MD

## 2022-02-16 NOTE — PROGRESS NOTES
Visit Information    Have you changed or started any medications since your last visit including any over-the-counter medicines, vitamins, or herbal medicines? no   Have you stopped taking any of your medications? Is so, why? -  yes - see list  Are you having any side effects from any of your medications? - no    Have you seen any other physician or provider since your last visit?  no   Have you had any other diagnostic tests since your last visit?  no   Have you been seen in the emergency room and/or had an admission in a hospital since we last saw you? Yes, [facility] 2-6-22 to 2-7-22  Have you had your routine dental cleaning in the past 6 months?  no     Do you have an active MyChart account? If no, what is the barrier?  Yes     Patient Care Team:  Martine Watson MD as PCP - General (Family Medicine)  Betsey Alanis MD as Surgeon (Cardiology)  Dewayne Whitmore MD as Consulting Physician (Cardiology)    Medical History Review  Past Medical, Family, and Social History reviewed and does not contribute to the patient presenting condition    Health Maintenance   Topic Date Due    Hepatitis C screen  Never done    COVID-19 Vaccine (1) Never done    Pneumococcal 0-64 years Vaccine (1 of 2 - PPSV23) Never done    HIV screen  Never done    DTaP/Tdap/Td vaccine (1 - Tdap) Never done    Lipid screen  07/22/2021    Flu vaccine (1) 09/01/2021    Depression Screen  03/18/2022    Potassium monitoring  02/06/2023    Creatinine monitoring  02/06/2023    Diabetes screen  06/14/2023    Cervical cancer screen  09/22/2025    Hepatitis A vaccine  Aged Out    Hepatitis B vaccine  Aged Out    Hib vaccine  Aged Out    Meningococcal (ACWY) vaccine  Aged Out

## 2022-02-16 NOTE — PROGRESS NOTES
Subjective:    Francisca Mcneill is a 42 y.o. female with  has a past medical history of Anxiety and Hypertension. Presented to the office today for:  Chief Complaint   Patient presents with    Results     MRI and lab     Discuss Medications     potassium options        HPI    This is a 42years old female presented to the office today for follow-up on palpitation. She was in the ED 10 days ago for complaint of palpitation. She was seen after that by cardiologist(reportedly by patient, no medical records) for potential Holter monitor recommended in our office, patient stated that the cardiologist assured her that the palpitation is probably from her thyroid not the heart. Though her TSH from 10 days back is normal.  Patient describes episodes of palpitation associated with shortness of breath, chest pain, sweating, and nausea lasting for minutes then resolved on its own, episodes not related to activity, she reported having some of the episodes at night waking her up, or during the day. She reported that she snores at night, woke up sometimes gasping for air, and gained weight recently. Patient has a history of anxiety for which she was put on Celexa, though she discontinue it due to side effects of trouble sleeping and no improvement in her anxiety. Review of Systems   Constitutional: Negative for activity change, appetite change, chills, diaphoresis, fatigue, fever and unexpected weight change. HENT: Negative for congestion, rhinorrhea and sore throat. Respiratory: Positive for shortness of breath (Episodic). Negative for cough and wheezing. Cardiovascular: Positive for chest pain (Episodic ) and palpitations (Episodic). Negative for leg swelling. Gastrointestinal: Negative for abdominal distention, abdominal pain, constipation, diarrhea, nausea and vomiting. Genitourinary: Negative for decreased urine volume, difficulty urinating, dysuria, pelvic pain and urgency.    Musculoskeletal: Negative for arthralgias and back pain. Neurological: Negative for tremors, weakness, light-headedness, numbness and headaches. Psychiatric/Behavioral: Negative for agitation and confusion. The patient is not nervous/anxious. The patient has a   Family History   Problem Relation Age of Onset    Liver Disease Mother     Cancer Father         bowel    Sickle Cell Anemia Sister     Asthma Son        Objective:    /79 (Site: Right Upper Arm, Position: Sitting, Cuff Size: Large Adult) Comment: machine  Pulse 83   Temp 97.3 °F (36.3 °C) (Temporal)   Ht 5' 4\" (1.626 m)   Wt 234 lb (106.1 kg)   LMP 02/05/2022   BMI 40.17 kg/m²    BP Readings from Last 3 Encounters:   02/16/22 122/79   02/07/22 (!) 150/86   02/06/22 (!) 192/116       Physical Exam  Constitutional:       Appearance: Normal appearance. Neck:      Comments: Enlarged thyroid bilaterally  No masses or nodules were felt  No enlarged lymph nodes in the neck    Cardiovascular:      Rate and Rhythm: Normal rate and regular rhythm. Pulses: Normal pulses. Heart sounds: Normal heart sounds. Pulmonary:      Effort: Pulmonary effort is normal.      Breath sounds: Normal breath sounds. Abdominal:      General: Bowel sounds are normal.      Palpations: Abdomen is soft. Musculoskeletal:      Right lower leg: No edema. Left lower leg: No edema. Neurological:      Mental Status: She is alert and oriented to person, place, and time.          Lab Results   Component Value Date    WBC 8.1 02/06/2022    HGB 11.7 (L) 02/06/2022    HCT 36.9 02/06/2022     02/06/2022    CHOL 129 07/22/2020    TRIG 68 07/22/2020    HDL 45 07/22/2020    ALT 17 02/06/2022    AST 17 02/06/2022     02/06/2022    K 3.3 (L) 02/06/2022     02/06/2022    CREATININE 0.80 02/06/2022    BUN 8 02/06/2022    CO2 20 02/06/2022    TSH 1.13 02/06/2022    INR 1.0 09/22/2020    LABA1C 5.6 06/14/2020     Lab Results   Component Value Date CALCIUM 8.7 02/06/2022    PHOS 3.9 02/06/2022     Lab Results   Component Value Date    LDLCHOLESTEROL 70 07/22/2020       Assessment and Plan:    1. Anxiety/palpitations  -Referred to be seen by Dr. Thiago Fox for counseling and therapy regarding anxiety and panic attacks.  -We will order sleep study to rule out CYNDI which might exacerbate the anxiety or panic attack given patient symptoms and stop bang score of 5 showing high risk for CYNDI. - Baseline Diagnostic Sleep Study; Future  -We will prescribe Atarax as needed    3. Screening for hyperlipidemia  - Lipid Panel; Future    4. Need for prophylactic vaccination against diphtheria-tetanus-pertussis (DTP)  - Tdap (age 6y and older) IM (Boostrix)    Discussed with the patient plan of care, patient voices understanding through teach back and in agreement. Will follow up in 1 month to review lab results sleep study and reassess patient symptoms after therapy. Requested Prescriptions     Signed Prescriptions Disp Refills    hydrOXYzine (ATARAX) 25 MG tablet 30 tablet 0     Sig: Take 1 tablet by mouth every 8 hours as needed for Itching       Medications Discontinued During This Encounter   Medication Reason    citalopram (CELEXA) 10 MG tablet Side effects       Shantwon received counseling on the following healthy behaviors: nutrition, exercise and medication adherence    Discussed use,benefit, and side effects of prescribed medications. Barriers to medication compliance addressed. All patient questions answered. Pt voiced understanding. Return in about 1 month (around 3/16/2022) for F/U on palpitation, sleep study and labs. Disclaimer: Some orall of this note was transcribed using voice-recognition software. This may cause typographical errors occasionally. Although all effort is made to fix these errors, please do not hesitate to contact our office if there Tessy Cardenas concern with the understanding of this note.

## 2022-03-16 ENCOUNTER — HOSPITAL ENCOUNTER (OUTPATIENT)
Dept: SLEEP CENTER | Age: 42
Discharge: HOME | End: 2022-03-18
Payer: MEDICARE

## 2022-03-16 DIAGNOSIS — G47.33 OSA (OBSTRUCTIVE SLEEP APNEA): Primary | ICD-10-CM

## 2022-03-16 DIAGNOSIS — F41.9 ANXIETY: ICD-10-CM

## 2022-03-16 PROCEDURE — 95810 POLYSOM 6/> YRS 4/> PARAM: CPT

## 2022-03-16 NOTE — PAYOR INFORMATION
Verified Demographics with Patient? No   If no why? Already verified  [facility] Completed? No    If not why? Already completed  Verified Insurance through: Already verified  [employer] Completed? Not required  Auth Verification #:[id number]  Liability Due: $0  Discount Offered: na%       Previous Balance: $ 0   Discount Offered: na%  Site Collect Status: no liability  Patient Response: no liability  Financial Aid Offered?  Yes Pt declined  Cards Scanned: Photo ID- yes / insurance card- not available at [facility]

## 2022-03-17 VITALS
WEIGHT: 234 LBS | HEART RATE: 54 BPM | OXYGEN SATURATION: 97 % | RESPIRATION RATE: 18 BRPM | HEIGHT: 64 IN | BODY MASS INDEX: 39.95 KG/M2

## 2022-03-17 ASSESSMENT — SLEEP AND FATIGUE QUESTIONNAIRES
HOW LIKELY ARE YOU TO NOD OFF OR FALL ASLEEP WHILE SITTING AND READING: 1
HOW LIKELY ARE YOU TO NOD OFF OR FALL ASLEEP WHILE WATCHING TV: 2
ESS TOTAL SCORE: 8
HOW LIKELY ARE YOU TO NOD OFF OR FALL ASLEEP WHILE SITTING INACTIVE IN A PUBLIC PLACE: 0
HOW LIKELY ARE YOU TO NOD OFF OR FALL ASLEEP WHEN YOU ARE A PASSENGER IN A CAR FOR AN HOUR WITHOUT A BREAK: 3
HOW LIKELY ARE YOU TO NOD OFF OR FALL ASLEEP IN A CAR, WHILE STOPPED FOR A FEW MINUTES IN TRAFFIC: 0
HOW LIKELY ARE YOU TO NOD OFF OR FALL ASLEEP WHILE LYING DOWN TO REST IN THE AFTERNOON WHEN CIRCUMSTANCES PERMIT: 1
HOW LIKELY ARE YOU TO NOD OFF OR FALL ASLEEP WHILE SITTING AND TALKING TO SOMEONE: 0
HOW LIKELY ARE YOU TO NOD OFF OR FALL ASLEEP WHILE SITTING QUIETLY AFTER LUNCH WITHOUT ALCOHOL: 1

## 2022-03-18 PROBLEM — Z13.220 SCREENING FOR HYPERLIPIDEMIA: Status: RESOLVED | Noted: 2022-02-16 | Resolved: 2022-03-18

## 2022-03-26 LAB — STATUS: NORMAL

## 2022-05-17 ENCOUNTER — TELEPHONE (OUTPATIENT)
Dept: FAMILY MEDICINE CLINIC | Age: 42
End: 2022-05-17

## 2022-05-17 NOTE — TELEPHONE ENCOUNTER
----- Message from Phillip Infante sent at 5/17/2022  3:05 PM EDT -----  Subject: Message to Provider    QUESTIONS  Information for Provider? Patient asking for an MRI of her right arm. patient fell about a year ago and needs the MRI for workman's comp.   ---------------------------------------------------------------------------  --------------  CALL BACK INFO  What is the best way for the office to contact you? OK to leave message on   voicemail  Preferred Call Back Phone Number? [phone]  ---------------------------------------------------------------------------  --------------  SCRIPT ANSWERS  Relationship to Patient?  Self

## 2022-05-17 NOTE — TELEPHONE ENCOUNTER
----- Message from Thai Perez sent at 5/17/2022  3:06 PM EDT -----  Subject: Message to Provider    QUESTIONS  Information for Provider? Patient states that she will only be home the   week on 6/16 and would like to have the MRI done that week as she will be   traveling before and after. ---------------------------------------------------------------------------  --------------  Katie BOOTH  What is the best way for the office to contact you? OK to leave message on   voicemail  Preferred Call Back Phone Number? [phone]  ---------------------------------------------------------------------------  --------------  SCRIPT ANSWERS  Relationship to Patient?  Self

## 2022-06-02 RX ORDER — TICAGRELOR 90 MG/1
TABLET ORAL
Qty: 180 TABLET | Refills: 4 | OUTPATIENT
Start: 2022-06-02

## 2022-06-14 DIAGNOSIS — G89.29 CHRONIC LEFT SHOULDER PAIN: Primary | ICD-10-CM

## 2022-06-14 DIAGNOSIS — M25.512 CHRONIC LEFT SHOULDER PAIN: Primary | ICD-10-CM

## 2022-06-24 ENCOUNTER — HOSPITAL ENCOUNTER (OUTPATIENT)
Dept: MRI IMAGING | Age: 42
Discharge: HOME | End: 2022-06-26

## 2022-06-24 DIAGNOSIS — M25.512 CHRONIC LEFT SHOULDER PAIN: ICD-10-CM

## 2022-06-24 DIAGNOSIS — G89.29 CHRONIC LEFT SHOULDER PAIN: ICD-10-CM

## 2022-09-21 DIAGNOSIS — I10 ESSENTIAL HYPERTENSION: ICD-10-CM

## 2022-09-21 NOTE — TELEPHONE ENCOUNTER
Last visit: 2/16/22  Last Med refill: 7/2022  Does patient have enough medication for 72 hours: No:     Next Visit Date:  No future appointments.     Health Maintenance   Topic Date Due    COVID-19 Vaccine (1) Never done    Pneumococcal 0-64 years Vaccine (1 - PCV) Never done    HIV screen  Never done    Hepatitis C screen  Never done    Lipids  07/22/2021    Flu vaccine (1) 09/01/2022    Depression Screen  02/16/2023    Diabetes screen  06/14/2023    Cervical cancer screen  09/22/2025    DTaP/Tdap/Td vaccine (2 - Td or Tdap) 02/16/2032    Hepatitis A vaccine  Aged Out    Hepatitis B vaccine  Aged Out    Hib vaccine  Aged Out    Meningococcal (ACWY) vaccine  Aged Out       Hemoglobin A1C (%)   Date Value   06/14/2020 5.6             ( goal A1C is < 7)   No results found for: LABMICR  LDL Cholesterol (mg/dL)   Date Value   07/22/2020 70   06/14/2020 103       (goal LDL is <100)   AST (U/L)   Date Value   02/06/2022 17     ALT (U/L)   Date Value   02/06/2022 17     BUN (mg/dL)   Date Value   02/06/2022 8     BP Readings from Last 3 Encounters:   02/16/22 122/79   02/07/22 (!) 150/86   02/06/22 (!) 192/116          (goal 120/80)    All Future Testing planned in [facility]  Lab Frequency Next Occurrence   Lipid Panel Once 02/16/2023               Patient Active Problem List:     Psychosis ([facility] [address]     Hypokalemia     Iron deficiency anemia secondary to inadequate dietary iron intake     Paranoid delusion ([address]     Essential hypertension     Chest pain     STEMI involving left anterior descending coronary artery (HCC)     Coronary artery disease involving native coronary artery of native heart without angina pectoris     Dyslipidemia, goal LDL below 70     Class 1 obesity with serious comorbidity and body mass index (BMI) of 34.0 to 34.9 in adult     Chronic left shoulder pain     Chronic foot pain, left     Trichomonosis     Thickened endometrium     Ego-dystonic lesbianism     History of palpitations     Anxiety     Need for prophylactic vaccination against diphtheria-tetanus-pertussis (DTP)

## 2022-09-23 RX ORDER — LISINOPRIL 10 MG/1
TABLET ORAL
Qty: 30 TABLET | Refills: 1 | Status: SHIPPED | OUTPATIENT
Start: 2022-09-23

## 2022-10-31 DIAGNOSIS — I10 ESSENTIAL HYPERTENSION: ICD-10-CM

## 2022-11-02 NOTE — TELEPHONE ENCOUNTER
E-scribe request for med refill. Please review and e-scribe if applicable.      Last Visit Date:  02/16/2022  Next Visit Date:  Visit date not found    Hemoglobin A1C (%)   Date Value   06/14/2020 5.6             ( goal A1C is < 7)   No results found for: LABMICR  LDL Cholesterol (mg/dL)   Date Value   07/22/2020 70       (goal LDL is <100)   AST (U/L)   Date Value   02/06/2022 17     ALT (U/L)   Date Value   02/06/2022 17     BUN (mg/dL)   Date Value   02/06/2022 8     BP Readings from Last 3 Encounters:   02/16/22 122/79   02/07/22 (!) 150/86   02/06/22 (!) 192/116          (goal 120/80)        Patient Active Problem List:     Psychosis ([facility] [address]     Hypokalemia     Iron deficiency anemia secondary to inadequate dietary iron intake     Paranoid delusion ([facility] [address]     Essential hypertension     Chest pain     STEMI involving left anterior descending coronary artery (HCC)     Coronary artery disease involving native coronary artery of native heart without angina pectoris     Dyslipidemia, goal LDL below 70     Class 1 obesity with serious comorbidity and body mass index (BMI) of 34.0 to 34.9 in adult     Chronic left shoulder pain     Chronic foot pain, left     Trichomonosis     Thickened endometrium     Ego-dystonic lesbianism     History of palpitations     Anxiety     Need for prophylactic vaccination against diphtheria-tetanus-pertussis (DTP)      ----Agustin Cummings

## 2022-11-16 DIAGNOSIS — I10 ESSENTIAL HYPERTENSION: ICD-10-CM

## 2022-11-16 NOTE — TELEPHONE ENCOUNTER
Last visit:   Last Med refill:   Does patient have enough medication for 72 hours: No:     Next Visit Date:  No future appointments.     Health Maintenance   Topic Date Due    COVID-19 Vaccine (1) Never done    Pneumococcal 0-64 years Vaccine (1 - PCV) Never done    HIV screen  Never done    Hepatitis C screen  Never done    Lipids  07/22/2021    Flu vaccine (1) 08/01/2022    Depression Screen  02/16/2023    Diabetes screen  06/14/2023    Cervical cancer screen  09/22/2025    DTaP/Tdap/Td vaccine (2 - Td or Tdap) 02/16/2032    Hepatitis A vaccine  Aged Out    Hib vaccine  Aged Out    Meningococcal (ACWY) vaccine  Aged Out       Hemoglobin A1C (%)   Date Value   06/14/2020 5.6             ( goal A1C is < 7)   No results found for: LABMICR  LDL Cholesterol (mg/dL)   Date Value   07/22/2020 70   06/14/2020 103       (goal LDL is <100)   AST (U/L)   Date Value   02/06/2022 17     ALT (U/L)   Date Value   02/06/2022 17     BUN (mg/dL)   Date Value   02/06/2022 8     BP Readings from Last 3 Encounters:   02/16/22 122/79   02/07/22 (!) 150/86   02/06/22 (!) 192/116          (goal 120/80)    All Future Testing planned in [facility]  Lab Frequency Next Occurrence   Lipid Panel Once 02/16/2023               Patient Active Problem List:     Psychosis ([address]     Hypokalemia     Iron deficiency anemia secondary to inadequate dietary iron intake     Paranoid delusion ([address]     Essential hypertension     Chest pain     STEMI involving left anterior descending coronary artery (HCC)     Coronary artery disease involving native coronary artery of native heart without angina pectoris     Dyslipidemia, goal LDL below 70     Class 1 obesity with serious comorbidity and body mass index (BMI) of 34.0 to 34.9 in adult     Chronic left shoulder pain     Chronic foot pain, left     Trichomonosis     Thickened endometrium     Ego-dystonic lesbianism     History of palpitations     Anxiety     Need for prophylactic vaccination against

## 2022-11-17 RX ORDER — LISINOPRIL 10 MG/1
TABLET ORAL
Qty: 30 TABLET | Refills: 1 | Status: SHIPPED | OUTPATIENT
Start: 2022-11-17

## 2023-02-02 DIAGNOSIS — I10 ESSENTIAL HYPERTENSION: ICD-10-CM

## 2023-02-03 RX ORDER — AMLODIPINE BESYLATE 5 MG/1
TABLET ORAL
Qty: 30 TABLET | Refills: 0 | Status: SHIPPED | OUTPATIENT
Start: 2023-02-03 | End: 2023-02-09

## 2023-02-03 NOTE — TELEPHONE ENCOUNTER
E-scribe request for med refill. Please review and e-scribe if applicable.      Last Visit Date:  02/16/2022  Next Visit Date:  Visit date not found    Hemoglobin A1C (%)   Date Value   06/14/2020 5.6             ( goal A1C is < 7)   No results found for: LABMICR  LDL Cholesterol (mg/dL)   Date Value   07/22/2020 70       (goal LDL is <100)   AST (U/L)   Date Value   02/06/2022 17     ALT (U/L)   Date Value   02/06/2022 17     BUN (mg/dL)   Date Value   02/06/2022 8     BP Readings from Last 3 Encounters:   02/16/22 122/79   02/07/22 (!) 150/86   02/06/22 (!) 192/116          (goal 120/80)        Patient Active Problem List:     Psychosis ([address]     Hypokalemia     Iron deficiency anemia secondary to inadequate dietary iron intake     Paranoid delusion ([facility] [address]     Essential hypertension     Chest pain     STEMI involving left anterior descending coronary artery (HCC)     Coronary artery disease involving native coronary artery of native heart without angina pectoris     Dyslipidemia, goal LDL below 70     Class 1 obesity with serious comorbidity and body mass index (BMI) of 34.0 to 34.9 in adult     Chronic left shoulder pain     Chronic foot pain, left     Trichomonosis     Thickened endometrium     Ego-dystonic lesbianism     History of palpitations     Anxiety     Need for prophylactic vaccination against diphtheria-tetanus-pertussis (DTP)      ----Aurelio Lugo

## 2023-02-09 ENCOUNTER — OFFICE VISIT (OUTPATIENT)
Dept: FAMILY MEDICINE CLINIC | Age: 43
End: 2023-02-09
Payer: MEDICARE

## 2023-02-09 ENCOUNTER — HOSPITAL ENCOUNTER (OUTPATIENT)
Age: 43
Setting detail: SPECIMEN
Discharge: HOME | End: 2023-02-09

## 2023-02-09 VITALS
BODY MASS INDEX: 39.99 KG/M2 | HEART RATE: 77 BPM | WEIGHT: 233 LBS | SYSTOLIC BLOOD PRESSURE: 136 MMHG | DIASTOLIC BLOOD PRESSURE: 88 MMHG

## 2023-02-09 DIAGNOSIS — D64.9 ANEMIA, UNSPECIFIED TYPE: ICD-10-CM

## 2023-02-09 DIAGNOSIS — Z00.00 HEALTHCARE MAINTENANCE: ICD-10-CM

## 2023-02-09 DIAGNOSIS — E66.09 CLASS 2 OBESITY DUE TO EXCESS CALORIES WITHOUT SERIOUS COMORBIDITY WITH BODY MASS INDEX (BMI) OF 39.0 TO 39.9 IN ADULT: ICD-10-CM

## 2023-02-09 DIAGNOSIS — R53.82 CHRONIC FATIGUE: ICD-10-CM

## 2023-02-09 DIAGNOSIS — I10 ESSENTIAL HYPERTENSION: ICD-10-CM

## 2023-02-09 DIAGNOSIS — M12.812 ROTATOR CUFF ARTHROPATHY, LEFT: Primary | ICD-10-CM

## 2023-02-09 DIAGNOSIS — F40.240 CLAUSTROPHOBIA: ICD-10-CM

## 2023-02-09 LAB
ABSOLUTE EOS #: 0.15 K/UL (ref 0–0.44)
ABSOLUTE IMMATURE GRANULOCYTE: <0.03 K/UL (ref 0–0.3)
ABSOLUTE LYMPH #: 1.67 K/UL (ref 1.1–3.7)
ABSOLUTE MONO #: 0.37 K/UL (ref 0.1–1.2)
ALBUMIN SERPL-MCNC: 4.3 G/DL (ref 3.5–5.2)
ALBUMIN/GLOBULIN RATIO: 1.2 (ref 1–2.5)
ALP SERPL-CCNC: 86 U/L (ref 35–104)
ALT SERPL-CCNC: 13 U/L (ref 5–33)
ANION GAP SERPL CALCULATED.3IONS-SCNC: 12 MMOL/L (ref 9–17)
AST SERPL-CCNC: 16 U/L
BASOPHILS # BLD: 0 % (ref 0–2)
BASOPHILS ABSOLUTE: <0.03 K/UL (ref 0–0.2)
BILIRUB SERPL-MCNC: 0.4 MG/DL (ref 0.3–1.2)
BUN SERPL-MCNC: 8 MG/DL (ref 6–20)
CALCIUM SERPL-MCNC: 9.1 MG/DL (ref 8.6–10.4)
CHLORIDE SERPL-SCNC: 102 MMOL/L (ref 98–107)
CHOLEST SERPL-MCNC: 185 MG/DL
CHOLESTEROL/HDL RATIO: 3.9
CO2 SERPL-SCNC: 20 MMOL/L (ref 20–31)
CREAT SERPL-MCNC: 0.59 MG/DL (ref 0.5–0.9)
EOSINOPHILS RELATIVE PERCENT: 3 % (ref 1–4)
EST. AVERAGE GLUCOSE BLD GHB EST-MCNC: 114 MG/DL
FERRITIN SERPL-MCNC: 18 NG/ML (ref 13–150)
GFR SERPL CREATININE-BSD FRML MDRD: >60 ML/MIN/1.73M2
GLUCOSE SERPL-MCNC: 96 MG/DL (ref 70–99)
HBA1C MFR BLD: 5.6 % (ref 4–6)
HCT VFR BLD AUTO: 37.5 % (ref 36.3–47.1)
HDLC SERPL-MCNC: 47 MG/DL
HGB BLD-MCNC: 11.6 G/DL (ref 11.9–15.1)
IMMATURE GRANULOCYTES: 0 %
IRON SATURATION: 25 % (ref 20–55)
IRON SERPL-MCNC: 106 UG/DL (ref 37–145)
LDLC SERPL CALC-MCNC: 128 MG/DL (ref 0–130)
LYMPHOCYTES # BLD: 30 % (ref 24–43)
MCH RBC QN AUTO: 26.7 PG (ref 25.2–33.5)
MCHC RBC AUTO-ENTMCNC: 30.9 G/DL (ref 28.4–34.8)
MCV RBC AUTO: 86.2 FL (ref 82.6–102.9)
MONOCYTES # BLD: 7 % (ref 3–12)
NRBC AUTOMATED: 0 PER 100 WBC
PDW BLD-RTO: 16.4 % (ref 11.8–14.4)
PLATELET # BLD AUTO: 223 K/UL (ref 138–453)
PMV BLD AUTO: 11.6 FL (ref 8.1–13.5)
POTASSIUM SERPL-SCNC: 3.9 MMOL/L (ref 3.7–5.3)
PROT SERPL-MCNC: 7.8 G/DL (ref 6.4–8.3)
RBC # BLD: 4.35 M/UL (ref 3.95–5.11)
RBC # BLD: ABNORMAL 10*6/UL
SEG NEUTROPHILS: 60 % (ref 36–65)
SEGMENTED NEUTROPHILS ABSOLUTE COUNT: 3.43 K/UL (ref 1.5–8.1)
SODIUM SERPL-SCNC: 134 MMOL/L (ref 135–144)
TIBC SERPL-MCNC: 428 UG/DL (ref 250–450)
TRIGL SERPL-MCNC: 48 MG/DL
TSH SERPL-ACNC: 1.49 UIU/ML (ref 0.3–5)
UNSATURATED IRON BINDING CAPACITY: 322 UG/DL (ref 112–347)
WBC # BLD AUTO: 5.7 K/UL (ref 3.5–11.3)

## 2023-02-09 PROCEDURE — 3075F SYST BP GE 130 - 139MM HG: CPT | Performed by: STUDENT IN AN ORGANIZED HEALTH CARE EDUCATION/TRAINING PROGRAM

## 2023-02-09 PROCEDURE — G8417 CALC BMI ABV UP PARAM F/U: HCPCS | Performed by: STUDENT IN AN ORGANIZED HEALTH CARE EDUCATION/TRAINING PROGRAM

## 2023-02-09 PROCEDURE — 3079F DIAST BP 80-89 MM HG: CPT | Performed by: STUDENT IN AN ORGANIZED HEALTH CARE EDUCATION/TRAINING PROGRAM

## 2023-02-09 PROCEDURE — 99214 OFFICE O/P EST MOD 30 MIN: CPT | Performed by: STUDENT IN AN ORGANIZED HEALTH CARE EDUCATION/TRAINING PROGRAM

## 2023-02-09 PROCEDURE — G8484 FLU IMMUNIZE NO ADMIN: HCPCS | Performed by: STUDENT IN AN ORGANIZED HEALTH CARE EDUCATION/TRAINING PROGRAM

## 2023-02-09 PROCEDURE — G8427 DOCREV CUR MEDS BY ELIG CLIN: HCPCS | Performed by: STUDENT IN AN ORGANIZED HEALTH CARE EDUCATION/TRAINING PROGRAM

## 2023-02-09 PROCEDURE — 4004F PT TOBACCO SCREEN RCVD TLK: CPT | Performed by: STUDENT IN AN ORGANIZED HEALTH CARE EDUCATION/TRAINING PROGRAM

## 2023-02-09 RX ORDER — ATORVASTATIN CALCIUM 40 MG/1
40 TABLET, FILM COATED ORAL NIGHTLY
Qty: 30 TABLET | Refills: 3 | Status: SHIPPED | OUTPATIENT
Start: 2023-02-09

## 2023-02-09 RX ORDER — LISINOPRIL 10 MG/1
TABLET ORAL
Qty: 30 TABLET | Refills: 1 | Status: SHIPPED | OUTPATIENT
Start: 2023-02-09

## 2023-02-09 RX ORDER — LORAZEPAM 0.5 MG/1
TABLET ORAL
Qty: 3 TABLET | Refills: 0 | Status: SHIPPED | OUTPATIENT
Start: 2023-02-09 | End: 2023-02-10

## 2023-02-09 RX ORDER — ASPIRIN 81 MG/1
81 TABLET ORAL DAILY
Qty: 30 TABLET | Refills: 3 | Status: SHIPPED | OUTPATIENT
Start: 2023-02-09

## 2023-02-09 SDOH — ECONOMIC STABILITY: INCOME INSECURITY: HOW HARD IS IT FOR YOU TO PAY FOR THE VERY BASICS LIKE FOOD, HOUSING, MEDICAL CARE, AND HEATING?: NOT VERY HARD

## 2023-02-09 SDOH — ECONOMIC STABILITY: FOOD INSECURITY: WITHIN THE PAST 12 MONTHS, THE FOOD YOU BOUGHT JUST DIDN'T LAST AND YOU DIDN'T HAVE MONEY TO GET MORE.: NEVER TRUE

## 2023-02-09 SDOH — ECONOMIC STABILITY: HOUSING INSECURITY
IN THE LAST 12 MONTHS, WAS THERE A TIME WHEN YOU DID NOT HAVE A STEADY PLACE TO SLEEP OR SLEPT IN A SHELTER (INCLUDING NOW)?: NO

## 2023-02-09 SDOH — ECONOMIC STABILITY: FOOD INSECURITY: WITHIN THE PAST 12 MONTHS, YOU WORRIED THAT YOUR FOOD WOULD RUN OUT BEFORE YOU GOT MONEY TO BUY MORE.: NEVER TRUE

## 2023-02-09 ASSESSMENT — ENCOUNTER SYMPTOMS
SHORTNESS OF BREATH: 0
VOMITING: 0
COLOR CHANGE: 0
CONSTIPATION: 0
DIARRHEA: 0
ABDOMINAL PAIN: 1
PHOTOPHOBIA: 0
NAUSEA: 0

## 2023-02-09 ASSESSMENT — PATIENT HEALTH QUESTIONNAIRE - PHQ9
SUM OF ALL RESPONSES TO PHQ QUESTIONS 1-9: 0
SUM OF ALL RESPONSES TO PHQ9 QUESTIONS 1 & 2: 0
SUM OF ALL RESPONSES TO PHQ QUESTIONS 1-9: 0
SUM OF ALL RESPONSES TO PHQ QUESTIONS 1-9: 0
1. LITTLE INTEREST OR PLEASURE IN DOING THINGS: 0
2. FEELING DOWN, DEPRESSED OR HOPELESS: 0
SUM OF ALL RESPONSES TO PHQ QUESTIONS 1-9: 0

## 2023-02-09 NOTE — PROGRESS NOTES
Subjective:    Krystin Pritchard is a 36 y.o. female with  has a past medical history of Anxiety and Hypertension. Presented to the office today for:  Chief Complaint   Patient presents with    Hypertension     Needs medication refilled     Other     Tried to get MRI, needs something to calm her down to get testing done        HPI    Left shoulder pain  Fell 10 years ago  Rotator cuff arthropathy  Wasn't chriss to do an MRI due to claustrophobia    HTN  136/88  Stable on 3 meds  No issues    Vague abd pain  Has had elevated ALP in past  Fam hx of gall stones    C/o fatigue and weight gain  Has anemia in past   Has heavy menstrual cycles and also on asa and brillinta    CAD w/ stents  On asa and brillinta   ([facility] Holliday since 2020)  Will see Cardiology on 23rd February to see need for continuation       Review of Systems   Constitutional:  Positive for fatigue and unexpected weight change. Negative for activity change and fever. Eyes:  Negative for photophobia. Respiratory:  Negative for shortness of breath. Cardiovascular:  Negative for chest pain. Gastrointestinal:  Positive for abdominal pain. Negative for constipation, diarrhea, nausea and vomiting. Endocrine: Negative for polyphagia and polyuria. Genitourinary:  Negative for dysuria. Musculoskeletal:  Positive for arthralgias. Negative for joint swelling. Skin:  Negative for color change. Neurological:  Positive for weakness. Negative for dizziness. Psychiatric/Behavioral:  Negative for agitation. The patient has a   Family History   Problem Relation Age of Onset    Liver Disease Mother     Cancer Father         bowel    Sickle Cell Anemia Sister     Asthma Son        Objective:    /88   Pulse 77   Wt 233 lb (105.7 kg)   BMI 39.99 kg/m²    BP Readings from Last 3 Encounters:   02/09/23 136/88   02/16/22 122/79   02/07/22 (!) 150/86       Physical Exam  Constitutional:       General: She is not in acute distress. Appearance: She is obese. She is not ill-appearing or toxic-appearing. Cardiovascular:      Rate and Rhythm: Normal rate and regular rhythm. Pulses: Normal pulses. Heart sounds: No murmur heard. Pulmonary:      Effort: Pulmonary effort is normal.      Breath sounds: Normal breath sounds. No wheezing. Abdominal:      General: Abdomen is flat. There is no distension. Palpations: Abdomen is soft. There is no mass. Tenderness: There is no abdominal tenderness. There is no guarding or rebound. Musculoskeletal:      Left shoulder: Tenderness present. No swelling, deformity, effusion or laceration. Decreased range of motion. Decreased strength. Comments: Decreased ROM/pain and weakness of left shoulder with extension and internal rotation    Skin:     General: Skin is warm. Findings: No erythema. Neurological:      Mental Status: She is alert and oriented to person, place, and time. Motor: No weakness. Lab Results   Component Value Date    WBC 8.1 02/06/2022    HGB 11.7 (L) 02/06/2022    HCT 36.9 02/06/2022     02/06/2022    CHOL 129 07/22/2020    TRIG 68 07/22/2020    HDL 45 07/22/2020    ALT 17 02/06/2022    AST 17 02/06/2022     02/06/2022    K 3.3 (L) 02/06/2022     02/06/2022    CREATININE 0.80 02/06/2022    BUN 8 02/06/2022    CO2 20 02/06/2022    TSH 1.13 02/06/2022    INR 1.0 09/22/2020    LABA1C 5.6 06/14/2020     Lab Results   Component Value Date    CALCIUM 8.7 02/06/2022    PHOS 3.9 02/06/2022     Lab Results   Component Value Date    LDLCHOLESTEROL 70 07/22/2020       Assessment and Plan:    1. Rotator cuff arthropathy, left  - MRI SHOULDER LEFT W WO CONTRAST; Future    2. Claustrophobia  - LORazepam (ATIVAN) 0.5 MG tablet; Take 1 pill 15 minutes before MRI procedure as needed for anxiety  Dispense: 3 tablet; Refill: 0    3. Healthcare maintenance  - Lipid Panel; Future    4.  Chronic fatigue  - CBC with Auto Differential; Future  - Ferritin; Future  - Iron and TIBC; Future  - TSH; Future  - Comprehensive Metabolic Panel; Future  - Hemoglobin A1C; Future    5. Class 2 obesity due to excess calories without serious comorbidity with body mass index (BMI) of 39.0 to 39.9 in adult  - TSH; Future  - Comprehensive Metabolic Panel; Future  - Hemoglobin A1C; Future    6. Anemia, unspecified type  - CBC with Auto Differential; Future  - Ferritin; Future  - Iron and TIBC; Future    7. Essential hypertension  Stable  Discontinued Norvasc today and will monitor   - lisinopril (PRINIVIL;ZESTRIL) 10 MG tablet; TAKE 1 TABLET BY MOUTH EVERY DAY  Dispense: 30 tablet; Refill: 1  - metoprolol tartrate (LOPRESSOR) 25 MG tablet; take 1 tablet by mouth twice a day  Dispense: 60 tablet; Refill: 3        Requested Prescriptions     Signed Prescriptions Disp Refills    LORazepam (ATIVAN) 0.5 MG tablet 3 tablet 0     Sig: Take 1 pill 15 minutes before MRI procedure as needed for anxiety    aspirin 81 MG EC tablet 30 tablet 3     Sig: Take 1 tablet by mouth daily    atorvastatin (LIPITOR) 40 MG tablet 30 tablet 3     Sig: Take 1 tablet by mouth nightly    lisinopril (PRINIVIL;ZESTRIL) 10 MG tablet 30 tablet 1     Sig: TAKE 1 TABLET BY MOUTH EVERY DAY    metoprolol tartrate (LOPRESSOR) 25 MG tablet 60 tablet 3     Sig: take 1 tablet by mouth twice a day       Medications Discontinued During This Encounter   Medication Reason    amLODIPine (NORVASC) 5 MG tablet LIST CLEANUP    aspirin 81 MG EC tablet REORDER    atorvastatin (LIPITOR) 40 MG tablet REORDER    metoprolol tartrate (LOPRESSOR) 25 MG tablet REORDER    lisinopril (PRINIVIL;ZESTRIL) 10 MG tablet REORDER       Shantwon received counseling on the following healthy behaviors: nutrition, exercise and medication adherence    Discussed use,benefit, and side effects of prescribed medications. Barriers to medication compliance addressed. All patient questions answered. Pt voiced understanding.      Return in about 6 weeks (around 3/23/2023) for lab f/u. Disclaimer: Some orall of this note was transcribed using voice-recognition software. This may cause typographical errors occasionally. Although all effort is made to fix these errors, please do not hesitate to contact our office if there Sabiha Joya concern with the understanding of this note.

## 2023-03-28 ENCOUNTER — OFFICE VISIT (OUTPATIENT)
Dept: FAMILY MEDICINE CLINIC | Age: 43
End: 2023-03-28
Payer: MEDICAID

## 2023-03-28 VITALS
HEART RATE: 73 BPM | BODY MASS INDEX: 40.85 KG/M2 | SYSTOLIC BLOOD PRESSURE: 160 MMHG | DIASTOLIC BLOOD PRESSURE: 98 MMHG | WEIGHT: 238 LBS

## 2023-03-28 DIAGNOSIS — M12.812 ROTATOR CUFF ARTHROPATHY, LEFT: Primary | ICD-10-CM

## 2023-03-28 DIAGNOSIS — I10 PRIMARY HYPERTENSION: ICD-10-CM

## 2023-03-28 PROCEDURE — 3077F SYST BP >= 140 MM HG: CPT | Performed by: STUDENT IN AN ORGANIZED HEALTH CARE EDUCATION/TRAINING PROGRAM

## 2023-03-28 PROCEDURE — 3080F DIAST BP >= 90 MM HG: CPT | Performed by: STUDENT IN AN ORGANIZED HEALTH CARE EDUCATION/TRAINING PROGRAM

## 2023-03-28 PROCEDURE — 99213 OFFICE O/P EST LOW 20 MIN: CPT | Performed by: STUDENT IN AN ORGANIZED HEALTH CARE EDUCATION/TRAINING PROGRAM

## 2023-03-28 ASSESSMENT — ENCOUNTER SYMPTOMS
ABDOMINAL PAIN: 0
SHORTNESS OF BREATH: 0
COLOR CHANGE: 0
NAUSEA: 0
VOMITING: 0
DIARRHEA: 0
WHEEZING: 0
APNEA: 0

## 2023-03-28 NOTE — PROGRESS NOTES
Subjective:    Fady Lucero is a 36 y.o. female with  has a past medical history of Anxiety and Hypertension. Presented to the office today for:  Chief Complaint   Patient presents with    Abdominal Pain     States gallbladder is giving issues bloating with eating and slight pain            HPI    Left-sided rotator cuff arthropathy  Patient feels to get the MRI due to severe claustrophobia  I will order another one with demonstrate instructions the patient can be sedated  I have a suspicion that the patient may have microtears in the subscapular tendon or other form of tendinopathy which is limiting her shoulder range of motion as well as pain    Patient with pressure slightly elevated after discontinuing Norvasc  Today initial blood pressure was 156/96  We will repeat 1 more again  Currently the patient is on lisinopril and Lopressor 25 mg  If she continues to have elevation in her blood pressure we will add Norvasc back on again    Patient has had extensive history of heart disease  With cardiac stents placed and  Patient will be seeing cardiology tomorrow and ask them if she still needs to be on Brilinta  Patient is compliant with her Lipitor and aspirin    Lab work reviewed with her thoroughly  No acute issues otherwise were found     Review of Systems   Constitutional:  Positive for activity change. Negative for fever and unexpected weight change. Respiratory:  Negative for apnea, shortness of breath and wheezing. Cardiovascular:  Negative for chest pain, palpitations and leg swelling. Gastrointestinal:  Negative for abdominal pain, diarrhea, nausea and vomiting. Musculoskeletal:  Positive for arthralgias. Negative for joint swelling. Skin:  Negative for color change. Neurological:  Negative for dizziness and weakness. Psychiatric/Behavioral:  Negative for agitation.                 The patient has a   Family History   Problem Relation Age of Onset    Liver Disease Mother     Cancer

## 2023-04-17 ENCOUNTER — TELEPHONE (OUTPATIENT)
Dept: FAMILY MEDICINE CLINIC | Age: 43
End: 2023-04-17

## 2023-04-17 DIAGNOSIS — M12.812 ROTATOR CUFF ARTHROPATHY OF LEFT SHOULDER: Primary | ICD-10-CM

## 2023-04-17 NOTE — TELEPHONE ENCOUNTER
[facility] scheduling contacting office because they need MRI changed from sedation to anesthesia. New order pending,  please advise.

## 2023-06-08 DIAGNOSIS — I10 ESSENTIAL HYPERTENSION: ICD-10-CM

## 2023-06-08 RX ORDER — LISINOPRIL 10 MG/1
TABLET ORAL
Qty: 30 TABLET | Refills: 1 | Status: SHIPPED | OUTPATIENT
Start: 2023-06-08

## 2023-06-08 NOTE — TELEPHONE ENCOUNTER
E-scribe request for med refills. Please review and e-scribe if applicable.      Last Visit Date:  3/28/23  Next Visit Date:  6/23/2023    Hemoglobin A1C (%)   Date Value   02/09/2023 5.6   06/14/2020 5.6             ( goal A1C is < 7)   No results found for: LABMICR  LDL Cholesterol (mg/dL)   Date Value   02/09/2023 128       (goal LDL is <100)   AST (U/L)   Date Value   02/09/2023 16     ALT (U/L)   Date Value   02/09/2023 13     BUN (mg/dL)   Date Value   02/09/2023 8     BP Readings from Last 3 Encounters:   03/28/23 (!) 160/98   02/09/23 136/88   02/16/22 122/79          (goal 120/80)        Patient Active Problem List:     Psychosis ([facility] [address]     Hypokalemia     Iron deficiency anemia secondary to inadequate dietary iron intake     Paranoid delusion ([facility] [address]     Essential hypertension     Chest pain     STEMI involving left anterior descending coronary artery (HCC)     Coronary artery disease involving native coronary artery of native heart without angina pectoris     Dyslipidemia, goal LDL below 70     Class 1 obesity with serious comorbidity and body mass index (BMI) of 34.0 to 34.9 in adult     Chronic left shoulder pain     Chronic foot pain, left     Trichomonosis     Thickened endometrium     Ego-dystonic lesbianism     History of palpitations     Anxiety     Need for prophylactic vaccination against diphtheria-tetanus-pertussis (DTP)      ----Dom Fernandez

## 2023-06-23 ENCOUNTER — TELEPHONE (OUTPATIENT)
Dept: FAMILY MEDICINE CLINIC | Age: 43
End: 2023-06-23

## 2023-06-23 NOTE — TELEPHONE ENCOUNTER
----- Message from Brooke Kelly sent at 6/23/2023  1:32 PM EDT -----  Subject: Message to Provider    QUESTIONS  Information for Provider? patient has seen this pcp. and states that he is   her provider. the system does not have him as her PCP and thus it is   trying to schedule her as a new patient. . she would like and appt sooner   vs later. ---------------------------------------------------------------------------  --------------  Eden Bass [facility]  [phone]; OK to leave message on voicemail  ---------------------------------------------------------------------------  --------------  SCRIPT ANSWERS  Relationship to Patient?  Self

## 2023-06-28 ENCOUNTER — TELEPHONE (OUTPATIENT)
Dept: FAMILY MEDICINE CLINIC | Age: 43
End: 2023-06-28

## 2023-06-28 NOTE — TELEPHONE ENCOUNTER
Patien calling in stating that she believes that she is having gallbladder issues, she states that when she eats it feels like food is getting stuck, she is bloated and having pain on her side. She did schedule an appointment for Monday but is asking what she should do until then.

## 2023-10-09 ENCOUNTER — TELEPHONE (OUTPATIENT)
Dept: FAMILY MEDICINE CLINIC | Age: 43
End: 2023-10-09

## 2023-10-09 DIAGNOSIS — M12.812 ROTATOR CUFF ARTHROPATHY OF LEFT SHOULDER: ICD-10-CM

## 2023-10-09 DIAGNOSIS — M12.812 ROTATOR CUFF ARTHROPATHY, LEFT: Primary | ICD-10-CM

## 2023-10-09 NOTE — TELEPHONE ENCOUNTER
PC from [facility] scheduling stating that MRI order needs put in again as it is  within the system, confirmed all aspects of order are correct just need to be certain it states with anesthesia. Order pended if we can sign they can pull out of Epic on their end.

## 2023-10-09 NOTE — TELEPHONE ENCOUNTER
Order placed as request by MA and Radiology team.  Please let writer know if any questions or concerns.

## 2023-10-19 DIAGNOSIS — I10 ESSENTIAL HYPERTENSION: ICD-10-CM

## 2023-10-19 RX ORDER — LISINOPRIL 10 MG/1
TABLET ORAL
Qty: 30 TABLET | Refills: 1 | OUTPATIENT
Start: 2023-10-19

## 2023-10-23 NOTE — TELEPHONE ENCOUNTER
Refill Request of Lisinopril received from patient, Pharmacy confirmed. Medication Pended. Pt last seen on 3/28/23, Next appt is 11/16/23.     Health Maintenance   Topic Date Due    Hepatitis B vaccine (1 of 3 - 3-dose series) Never done    COVID-19 Vaccine (1) Never done    Pneumococcal 0-64 years Vaccine (1 - PCV) Never done    HIV screen  Never done    Hepatitis C screen  Never done    Flu vaccine (1) 08/01/2023    Lipids  02/09/2024    Depression Screen  02/09/2024    Cervical cancer screen  09/22/2025    DTaP/Tdap/Td vaccine (2 - Td or Tdap) 02/16/2032    Hepatitis A vaccine  Aged Out    Hib vaccine  Aged Out    HPV vaccine  Aged Out    Meningococcal (ACWY) vaccine  Aged Out    Diabetes screen  Discontinued       Hemoglobin A1C (%)   Date Value   02/09/2023 5.6   06/14/2020 5.6             ( goal A1C is < 7)   No components found for: \"LABMICR\"  LDL Cholesterol (mg/dL)   Date Value   02/09/2023 128       (goal LDL is <100)   AST (U/L)   Date Value   02/09/2023 16     ALT (U/L)   Date Value   02/09/2023 13     BUN (mg/dL)   Date Value   02/09/2023 8     BP Readings from Last 3 Encounters:   03/28/23 (!) 160/98   02/09/23 136/88   02/16/22 122/79          (goal 120/80)    All Future Testing planned in [facility]  Lab Frequency Next Occurrence   MRI SHOULDER LEFT W WO CONTRAST Once 10/09/2023       Next Visit Date:  Future Appointments   Date Time Provider [address]   11/7/2023 11:30 AM STV MRI RM 1 (1.5T) STVZ MRI STV Radiolog   11/16/2023 10:30 AM MD Danielle Elaine   12/19/2023  8:45 AM Мария Casiano MD AFL [facility] TOLE AFL [facility] C            Patient Active Problem List:     Psychosis ([address])     Hypokalemia     Iron deficiency anemia secondary to inadequate dietary iron intake     Paranoid delusion ([address])     Essential hypertension     Chest pain     STEMI involving left anterior descending coronary artery ([address])     Coronary artery disease involving native coronary artery of native

## 2023-10-24 RX ORDER — LISINOPRIL 10 MG/1
TABLET ORAL
Qty: 30 TABLET | Refills: 1 | Status: SHIPPED | OUTPATIENT
Start: 2023-10-24

## 2023-11-06 ENCOUNTER — ANESTHESIA EVENT (OUTPATIENT)
Dept: MRI IMAGING | Age: 43
End: 2023-11-06
Payer: COMMERCIAL

## 2023-11-07 ENCOUNTER — HOSPITAL ENCOUNTER (OUTPATIENT)
Dept: MRI IMAGING | Age: 43
Discharge: HOME | End: 2023-11-09
Attending: FAMILY MEDICINE
Payer: COMMERCIAL

## 2023-11-07 ENCOUNTER — ANESTHESIA (OUTPATIENT)
Dept: MRI IMAGING | Age: 43
End: 2023-11-07
Payer: COMMERCIAL

## 2023-11-07 VITALS
RESPIRATION RATE: 18 BRPM | HEART RATE: 65 BPM | SYSTOLIC BLOOD PRESSURE: 159 MMHG | DIASTOLIC BLOOD PRESSURE: 107 MMHG | TEMPERATURE: 97.2 F | OXYGEN SATURATION: 96 %

## 2023-11-07 DIAGNOSIS — M12.812 ROTATOR CUFF ARTHROPATHY OF LEFT SHOULDER: ICD-10-CM

## 2023-11-07 DIAGNOSIS — M12.812 ROTATOR CUFF ARTHROPATHY, LEFT: ICD-10-CM

## 2023-11-07 LAB
BUN BLD-MCNC: 6 MG/DL (ref 8–26)
CA-I BLD-SCNC: 1.21 MMOL/L (ref 1.15–1.33)
CHLORIDE BLD-SCNC: 105 MMOL/L (ref 98–107)
CO2 BLD CALC-SCNC: 25 MMOL/L (ref 22–30)
EGFR, POC: >60 ML/MIN/1.73M2
EKG ATRIAL RATE: 58 BPM
EKG P AXIS: 17 DEGREES
EKG P-R INTERVAL: 196 MS
EKG Q-T INTERVAL: 412 MS
EKG QRS DURATION: 88 MS
EKG QTC CALCULATION (BAZETT): 404 MS
EKG R AXIS: 11 DEGREES
EKG T AXIS: 9 DEGREES
EKG VENTRICULAR RATE: 58 BPM
GLUCOSE BLD-MCNC: 82 MG/DL (ref 74–100)
HCG, PREGNANCY URINE (POC): NEGATIVE
HCO3 VENOUS: 25.3 MMOL/L (ref 22–29)
HCT VFR BLD AUTO: 39 % (ref 36–46)
NEGATIVE BASE EXCESS, VEN: 0.4 MMOL/L (ref 0–2)
O2 SAT, VEN: 74 % (ref 60–85)
PCO2, VEN: 44.3 MM HG (ref 41–51)
PH VENOUS: 7.36 (ref 7.32–7.43)
PO2, VEN: 40.9 MM HG (ref 30–50)
POC ANION GAP: 11 MMOL/L (ref 7–16)
POC CREATININE: 0.4 MG/DL (ref 0.51–1.19)
POC HEMOGLOBIN (CALC): 13.4 G/DL (ref 12–16)
POC LACTIC ACID: 0.8 MMOL/L (ref 0.56–1.39)
POTASSIUM BLD-SCNC: 4.1 MMOL/L (ref 3.5–4.5)
SODIUM BLD-SCNC: 140 MMOL/L (ref 138–146)

## 2023-11-07 PROCEDURE — 6360000004 HC RX CONTRAST MEDICATION: Performed by: STUDENT IN AN ORGANIZED HEALTH CARE EDUCATION/TRAINING PROGRAM

## 2023-11-07 PROCEDURE — 82803 BLOOD GASES ANY COMBINATION: CPT

## 2023-11-07 PROCEDURE — 7100000001 HC PACU RECOVERY - ADDTL 15 MIN

## 2023-11-07 PROCEDURE — 2580000003 HC RX 258: Performed by: NURSE ANESTHETIST, CERTIFIED REGISTERED

## 2023-11-07 PROCEDURE — 85014 HEMATOCRIT: CPT

## 2023-11-07 PROCEDURE — 81025 URINE PREGNANCY TEST: CPT

## 2023-11-07 PROCEDURE — 93010 ELECTROCARDIOGRAM REPORT: CPT | Performed by: INTERNAL MEDICINE

## 2023-11-07 PROCEDURE — 80051 ELECTROLYTE PANEL: CPT

## 2023-11-07 PROCEDURE — 82947 ASSAY GLUCOSE BLOOD QUANT: CPT

## 2023-11-07 PROCEDURE — 2580000003 HC RX 258: Performed by: ANESTHESIOLOGY

## 2023-11-07 PROCEDURE — 82330 ASSAY OF CALCIUM: CPT

## 2023-11-07 PROCEDURE — 93005 ELECTROCARDIOGRAM TRACING: CPT | Performed by: ANESTHESIOLOGY

## 2023-11-07 PROCEDURE — 3700000000 HC ANESTHESIA ATTENDED CARE

## 2023-11-07 PROCEDURE — 2500000003 HC RX 250 WO HCPCS: Performed by: NURSE ANESTHETIST, CERTIFIED REGISTERED

## 2023-11-07 PROCEDURE — A9576 INJ PROHANCE MULTIPACK: HCPCS | Performed by: STUDENT IN AN ORGANIZED HEALTH CARE EDUCATION/TRAINING PROGRAM

## 2023-11-07 PROCEDURE — 84520 ASSAY OF UREA NITROGEN: CPT

## 2023-11-07 PROCEDURE — 6360000002 HC RX W HCPCS: Performed by: NURSE ANESTHETIST, CERTIFIED REGISTERED

## 2023-11-07 PROCEDURE — 73223 MRI JOINT UPR EXTR W/O&W/DYE: CPT

## 2023-11-07 PROCEDURE — 7100000000 HC PACU RECOVERY - FIRST 15 MIN

## 2023-11-07 PROCEDURE — 7100000010 HC PHASE II RECOVERY - FIRST 15 MIN

## 2023-11-07 PROCEDURE — 83605 ASSAY OF LACTIC ACID: CPT

## 2023-11-07 PROCEDURE — 82565 ASSAY OF CREATININE: CPT

## 2023-11-07 PROCEDURE — 3700000001 HC ADD 15 MINUTES (ANESTHESIA)

## 2023-11-07 RX ORDER — ALBUTEROL SULFATE 2.5 MG/3ML
2.5 SOLUTION RESPIRATORY (INHALATION) EVERY 8 HOURS PRN
Status: DISCONTINUED | OUTPATIENT
Start: 2023-11-07 | End: 2023-11-10 | Stop reason: HOSPADM

## 2023-11-07 RX ORDER — MIDAZOLAM HYDROCHLORIDE 2 MG/2ML
1 INJECTION, SOLUTION INTRAMUSCULAR; INTRAVENOUS EVERY 10 MIN PRN
Status: DISCONTINUED | OUTPATIENT
Start: 2023-11-07 | End: 2023-11-10 | Stop reason: HOSPADM

## 2023-11-07 RX ORDER — SODIUM CHLORIDE 0.9 % (FLUSH) 0.9 %
10 SYRINGE (ML) INJECTION PRN
Status: DISCONTINUED | OUTPATIENT
Start: 2023-11-07 | End: 2023-11-10 | Stop reason: HOSPADM

## 2023-11-07 RX ORDER — FENTANYL CITRATE 50 UG/ML
50 INJECTION, SOLUTION INTRAMUSCULAR; INTRAVENOUS EVERY 5 MIN PRN
Status: DISCONTINUED | OUTPATIENT
Start: 2023-11-07 | End: 2023-11-10 | Stop reason: HOSPADM

## 2023-11-07 RX ORDER — SODIUM CHLORIDE, SODIUM LACTATE, POTASSIUM CHLORIDE, CALCIUM CHLORIDE 600; 310; 30; 20 MG/100ML; MG/100ML; MG/100ML; MG/100ML
INJECTION, SOLUTION INTRAVENOUS CONTINUOUS PRN
Status: DISCONTINUED | OUTPATIENT
Start: 2023-11-07 | End: 2023-11-07 | Stop reason: SDUPTHER

## 2023-11-07 RX ORDER — DIPHENHYDRAMINE HYDROCHLORIDE 50 MG/ML
12.5 INJECTION INTRAMUSCULAR; INTRAVENOUS
Status: ACTIVE | OUTPATIENT
Start: 2023-11-07 | End: 2023-11-08

## 2023-11-07 RX ORDER — SODIUM CHLORIDE 0.9 % (FLUSH) 0.9 %
5-40 SYRINGE (ML) INJECTION EVERY 12 HOURS SCHEDULED
Status: DISCONTINUED | OUTPATIENT
Start: 2023-11-07 | End: 2023-11-10 | Stop reason: HOSPADM

## 2023-11-07 RX ORDER — SODIUM CHLORIDE 9 MG/ML
INJECTION, SOLUTION INTRAVENOUS PRN
Status: DISCONTINUED | OUTPATIENT
Start: 2023-11-07 | End: 2023-11-10 | Stop reason: HOSPADM

## 2023-11-07 RX ORDER — FENTANYL CITRATE 50 UG/ML
25 INJECTION, SOLUTION INTRAMUSCULAR; INTRAVENOUS
Status: ACTIVE | OUTPATIENT
Start: 2023-11-07 | End: 2023-11-08

## 2023-11-07 RX ORDER — LIDOCAINE HYDROCHLORIDE 10 MG/ML
1 INJECTION, SOLUTION INFILTRATION; PERINEURAL
Status: ACTIVE | OUTPATIENT
Start: 2023-11-07 | End: 2023-11-08

## 2023-11-07 RX ORDER — PROPOFOL 10 MG/ML
INJECTION, EMULSION INTRAVENOUS PRN
Status: DISCONTINUED | OUTPATIENT
Start: 2023-11-07 | End: 2023-11-07 | Stop reason: SDUPTHER

## 2023-11-07 RX ORDER — SODIUM CHLORIDE, SODIUM LACTATE, POTASSIUM CHLORIDE, CALCIUM CHLORIDE 600; 310; 30; 20 MG/100ML; MG/100ML; MG/100ML; MG/100ML
INJECTION, SOLUTION INTRAVENOUS CONTINUOUS
Status: DISCONTINUED | OUTPATIENT
Start: 2023-11-07 | End: 2023-11-10 | Stop reason: HOSPADM

## 2023-11-07 RX ORDER — SODIUM CHLORIDE 0.9 % (FLUSH) 0.9 %
5-40 SYRINGE (ML) INJECTION PRN
Status: DISCONTINUED | OUTPATIENT
Start: 2023-11-07 | End: 2023-11-10 | Stop reason: HOSPADM

## 2023-11-07 RX ORDER — ONDANSETRON 2 MG/ML
4 INJECTION INTRAMUSCULAR; INTRAVENOUS
Status: ACTIVE | OUTPATIENT
Start: 2023-11-07 | End: 2023-11-08

## 2023-11-07 RX ORDER — FENTANYL CITRATE 50 UG/ML
25 INJECTION, SOLUTION INTRAMUSCULAR; INTRAVENOUS EVERY 5 MIN PRN
Status: DISCONTINUED | OUTPATIENT
Start: 2023-11-07 | End: 2023-11-10 | Stop reason: HOSPADM

## 2023-11-07 RX ORDER — MIDAZOLAM HYDROCHLORIDE 1 MG/ML
INJECTION INTRAMUSCULAR; INTRAVENOUS PRN
Status: DISCONTINUED | OUTPATIENT
Start: 2023-11-07 | End: 2023-11-07 | Stop reason: SDUPTHER

## 2023-11-07 RX ORDER — LIDOCAINE HYDROCHLORIDE 10 MG/ML
INJECTION, SOLUTION EPIDURAL; INFILTRATION; INTRACAUDAL; PERINEURAL PRN
Status: DISCONTINUED | OUTPATIENT
Start: 2023-11-07 | End: 2023-11-07 | Stop reason: SDUPTHER

## 2023-11-07 RX ORDER — FENTANYL CITRATE 50 UG/ML
50 INJECTION, SOLUTION INTRAMUSCULAR; INTRAVENOUS
Status: ACTIVE | OUTPATIENT
Start: 2023-11-07 | End: 2023-11-08

## 2023-11-07 RX ADMIN — LIDOCAINE HYDROCHLORIDE 50 MG: 10 INJECTION, SOLUTION EPIDURAL; INFILTRATION; INTRACAUDAL; PERINEURAL at 11:56

## 2023-11-07 RX ADMIN — PROPOFOL 200 MG: 10 INJECTION, EMULSION INTRAVENOUS at 11:56

## 2023-11-07 RX ADMIN — GADOTERIDOL 20 ML: 279.3 INJECTION, SOLUTION INTRAVENOUS at 12:34

## 2023-11-07 RX ADMIN — PROPOFOL 150 MCG/KG/MIN: 10 INJECTION, EMULSION INTRAVENOUS at 11:57

## 2023-11-07 RX ADMIN — MIDAZOLAM 2 MG: 1 INJECTION INTRAMUSCULAR; INTRAVENOUS at 11:53

## 2023-11-07 RX ADMIN — SODIUM CHLORIDE, POTASSIUM CHLORIDE, SODIUM LACTATE AND CALCIUM CHLORIDE: 600; 310; 30; 20 INJECTION, SOLUTION INTRAVENOUS at 11:12

## 2023-11-07 RX ADMIN — SODIUM CHLORIDE, POTASSIUM CHLORIDE, SODIUM LACTATE AND CALCIUM CHLORIDE: 600; 310; 30; 20 INJECTION, SOLUTION INTRAVENOUS at 11:41

## 2023-11-07 ASSESSMENT — PAIN DESCRIPTION - LOCATION: LOCATION: ARM;HIP;SHOULDER

## 2023-11-07 ASSESSMENT — PAIN DESCRIPTION - ORIENTATION: ORIENTATION: LEFT

## 2023-11-07 ASSESSMENT — PAIN SCALES - GENERAL: PAINLEVEL_OUTOF10: 4

## 2023-11-07 NOTE — H&P
History and Physical    Pt Name: Candy Mcgrath  MRN: [id number]  YOB: 1980  Date of evaluation: 11/7/2023  Primary Care Physician: Nahomi Das MD    SUBJECTIVE:   History of Chief Complaint:    Candy Mcgrath is a 39 y.o. female who is scheduled today for MRI left shoulder with anesthesia. She reports injuring her left shoulder after falling at work in 2013/2014 when she worked at [employer] and slipped on wet floor. She reports trying conservative treatment to include Physical therapy and Motrin. She reports chronic left shoulder pain, \"can't use, can't lift\" and limited range of motion. 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 She rates her left shoulder pain a 4/10, states it feels numb. Hx claustrophobia. Allergies  is allergic to latex. Medications  Prior to Admission medications    Medication Sig Start Date End Date Taking? Authorizing Provider   lisinopril (PRINIVIL;ZESTRIL) 10 MG tablet take 1 tablet by mouth once daily 10/24/23   Liv Staples DO   atorvastatin (LIPITOR) 40 MG tablet take 1 tablet by mouth nightly 8/21/23   Мария Casiano MD   aspirin 81 MG EC tablet Take 1 tablet by mouth daily 2/9/23   Nahomi Das MD   metoprolol tartrate (LOPRESSOR) 25 MG tablet take 1 tablet by mouth twice a day 2/9/23   Nahomi Das MD   nicotine (NICODERM CQ) 7 MG/24HR Place 1 patch onto the skin every 24 hours  Patient not taking: Reported on 2/16/2022 9/16/21 9/16/22  Nahomi Das MD   diclofenac sodium (VOLTAREN) 1 % GEL Apply 4 g topically 4 times daily 9/29/20   Arpita Douglas MD   nitroGLYCERIN (NITROSTAT) 0.4 MG SL tablet Place 1 tablet under the tongue every 5 minutes as needed for Chest pain up to max of 3 total doses.  If no relief after 1 dose, call 911. 7/23/20   Jalen Maher RN     Past Medical History    has a past medical history of Anxiety, CAD (coronary artery disease), Claustrophobia, COVID-19, COVID-19

## 2023-11-07 NOTE — ANESTHESIA POSTPROCEDURE EVALUATION
Department of Anesthesiology  Postprocedure Note    Patient: Laurel Jameson  MRN: [id number]  YOB: 1980  Date of evaluation: 11/7/2023      Procedure Summary     Date: 11/07/23 Room / Location: [address] [place] [address],[address] MRI    Anesthesia Start: 9605 Anesthesia Stop: 1250    Procedure: MRI SHOULDER LEFT W WO CONTRAST Diagnosis:       Rotator cuff arthropathy of left shoulder      Rotator cuff arthropathy, left      (looking for type and extent of rotator cuff injury)    Scheduled Providers:  Responsible Provider: Zia Chase MD    Anesthesia Type: MAC ASA Status: 3          Anesthesia Type: No value filed.     Kaylee Phase I: Kaylee Score: 9    Kaylee Phase II:        Anesthesia Post Evaluation    Patient location during evaluation: PACU  Patient participation: complete - patient participated  Level of consciousness: awake  Pain score: 1  Airway patency: patent  Nausea & Vomiting: no nausea and no vomiting  Complications: no  Cardiovascular status: blood pressure returned to baseline and hemodynamically stable  Respiratory status: acceptable  Hydration status: euvolemic  Pain management: adequate

## 2023-11-16 ENCOUNTER — OFFICE VISIT (OUTPATIENT)
Dept: FAMILY MEDICINE CLINIC | Age: 43
End: 2023-11-16
Payer: COMMERCIAL

## 2023-11-16 VITALS
TEMPERATURE: 97.5 F | HEIGHT: 64 IN | WEIGHT: 251 LBS | SYSTOLIC BLOOD PRESSURE: 134 MMHG | HEART RATE: 69 BPM | BODY MASS INDEX: 42.85 KG/M2 | DIASTOLIC BLOOD PRESSURE: 75 MMHG | OXYGEN SATURATION: 98 %

## 2023-11-16 DIAGNOSIS — Z00.00 HEALTH CARE MAINTENANCE: ICD-10-CM

## 2023-11-16 DIAGNOSIS — Z12.31 ENCOUNTER FOR SCREENING MAMMOGRAM FOR MALIGNANT NEOPLASM OF BREAST: ICD-10-CM

## 2023-11-16 DIAGNOSIS — S46.012D TRAUMATIC INCOMPLETE TEAR OF LEFT ROTATOR CUFF, SUBSEQUENT ENCOUNTER: ICD-10-CM

## 2023-11-16 DIAGNOSIS — R10.12 LUQ PAIN: ICD-10-CM

## 2023-11-16 DIAGNOSIS — I10 ESSENTIAL HYPERTENSION: Primary | ICD-10-CM

## 2023-11-16 PROBLEM — S46.012A TRAUMATIC INCOMPLETE TEAR OF LEFT ROTATOR CUFF: Status: ACTIVE | Noted: 2023-11-16

## 2023-11-16 PROCEDURE — 90677 PCV20 VACCINE IM: CPT | Performed by: FAMILY MEDICINE

## 2023-11-16 PROCEDURE — 99211 OFF/OP EST MAY X REQ PHY/QHP: CPT | Performed by: FAMILY MEDICINE

## 2023-11-16 PROCEDURE — 90746 HEPB VACCINE 3 DOSE ADULT IM: CPT | Performed by: FAMILY MEDICINE

## 2023-11-16 RX ORDER — FUROSEMIDE 20 MG/1
20 TABLET ORAL DAILY
Qty: 60 TABLET | Refills: 3 | Status: SHIPPED | OUTPATIENT
Start: 2023-11-16

## 2023-11-16 ASSESSMENT — PATIENT HEALTH QUESTIONNAIRE - PHQ9
SUM OF ALL RESPONSES TO PHQ QUESTIONS 1-9: 0
2. FEELING DOWN, DEPRESSED OR HOPELESS: 0
SUM OF ALL RESPONSES TO PHQ QUESTIONS 1-9: 0
1. LITTLE INTEREST OR PLEASURE IN DOING THINGS: 0
SUM OF ALL RESPONSES TO PHQ9 QUESTIONS 1 & 2: 0
SUM OF ALL RESPONSES TO PHQ QUESTIONS 1-9: 0
SUM OF ALL RESPONSES TO PHQ QUESTIONS 1-9: 0

## 2023-11-16 ASSESSMENT — ENCOUNTER SYMPTOMS
NAUSEA: 0
VOMITING: 0
DIARRHEA: 0
COUGH: 0
ABDOMINAL PAIN: 1
WHEEZING: 0
CONSTIPATION: 0

## 2023-11-16 NOTE — PROGRESS NOTES
Attending Physician Statement  [facility],  with the resident. I have reviewed the key elements of all parts of the encounter with the resident. I agree with the assessment, plan and orders as documented by the resident.   (GE Modifier)    Atopic Dermatitis- elbows- HC cream  HTN- Controlled  LE edema- lasix 20 mg   Splenomegaly- US
Here for follow up     Essential HTN: Hypertension, discontinue lisinopril due to side effects nausea and stomach upset, will start on Lasix 20 mg daily, last kidney function reviewed was normal  Left upper quadrant pain, worse after eating, cannot rule out splenomegaly on exam, will order ultrasound  Rotator cuff tear : Causing left shoulder pain, MRI showed rotator cuff partial tear, will start with physical therapy, consider referral to orthopedic in 3 months if no improvement in pain, no weakness      Review of Systems   Constitutional:  Negative for chills, fatigue and fever. Respiratory:  Negative for cough and wheezing. Cardiovascular:  Positive for leg swelling. Negative for chest pain and palpitations. Gastrointestinal:  Positive for abdominal pain (Left upper quadrant). Negative for constipation, diarrhea, nausea and vomiting. Musculoskeletal:         Left shoulder        Vitals:    11/16/23 1107   BP: 134/75   Pulse: 69   Temp:    SpO2:      Physical Exam  Cardiovascular:      Rate and Rhythm: Normal rate and regular rhythm. Pulses: Normal pulses. Heart sounds: Normal heart sounds. Pulmonary:      Effort: Pulmonary effort is normal.      Breath sounds: Normal breath sounds. Abdominal:      General: There is no distension. Palpations: Abdomen is soft. There is no mass. Tenderness: There is abdominal tenderness (On the palpation of left upper quadrant). There is no guarding or rebound. Hernia: No hernia is present. Diagnosis Orders   1. Essential hypertension        2. LUQ pain  US ABDOMEN LIMITED      3. Encounter for screening mammogram for malignant neoplasm of breast  CHESTER DIGITAL SCREEN W OR WO CAD BILATERAL      4.  Traumatic incomplete tear of left rotator cuff, subsequent encounter            Plan:  1.)  Hypertension, discontinue lisinopril due to side effects nausea and stomach upset, will start on Lasix 20 mg daily, last kidney function reviewed was
vaccine (2 - Td or Tdap) 02/16/2032    Hepatitis A vaccine  Aged Out    Hib vaccine  Aged Out    HPV vaccine  Aged Out    Meningococcal (ACWY) vaccine  Aged Out    Diabetes screen  Discontinued

## 2023-11-16 NOTE — PATIENT INSTRUCTIONS
Thank you for letting us take care of you today. We hope all your questions were addressed. If a question was overlooked or something else comes to mind after you return home, please contact a member of your Care Team listed below. Your Care Team at [address] [place] [address] is Team #2  Avery Spence M.D. (Faculty)  Glenroy Gomez, (Resident)  Gerhardt Lulas, (Resident)  Radha Farley, (Resident)  Caroline Brewer, (Resident)  Umair Cross, (Resident)  [facility], [address] [place] [address], [facility]  Estee Dewitt,  [facility] , [facility]  Severiano Maizes, [facility]  Chacha Etienne, [facility]  Sammi Nichols) [place], [place] ([address])  Deanna Wolff [facility] (Clinical Pharmacist)     Office phone number: [phone]    If you need to get in right away due to illness, please be advised we have \"Same Day\" appointments available Monday-Friday. Please call us at [phone] option #3 to schedule your \"Same Day\" appointment.

## 2023-11-22 ENCOUNTER — HOSPITAL ENCOUNTER (OUTPATIENT)
Dept: ULTRASOUND IMAGING | Age: 43
Discharge: HOME | End: 2023-11-24
Payer: COMMERCIAL

## 2023-11-22 DIAGNOSIS — R10.12 LUQ PAIN: ICD-10-CM

## 2023-11-22 PROCEDURE — 76705 ECHO EXAM OF ABDOMEN: CPT

## 2023-12-04 ENCOUNTER — TELEPHONE (OUTPATIENT)
Dept: FAMILY MEDICINE CLINIC | Age: 43
End: 2023-12-04

## 2023-12-04 DIAGNOSIS — M12.812 ROTATOR CUFF ARTHROPATHY, LEFT: Primary | ICD-10-CM

## 2023-12-04 NOTE — TELEPHONE ENCOUNTER
Patient called office asking about her US results. She is also asking if something can be sent to her pharmacy for pain prior to starting PT on 12/06/23. Finally patient asked if surgery was an option for her shoulder? Writer informed patient that it would be up to the physician if that is needed. Patient currently does not see a Orthopedic, and she is asking for a referral to see one.

## 2023-12-06 ENCOUNTER — TELEPHONE (OUTPATIENT)
Dept: FAMILY MEDICINE CLINIC | Age: 43
End: 2023-12-06

## 2023-12-06 ENCOUNTER — HOSPITAL ENCOUNTER (OUTPATIENT)
Dept: PHYSICAL THERAPY | Age: 43
Setting detail: THERAPIES SERIES
Discharge: HOME | End: 2023-12-06
Payer: COMMERCIAL

## 2023-12-06 DIAGNOSIS — M12.812 ROTATOR CUFF ARTHROPATHY, LEFT: Primary | ICD-10-CM

## 2023-12-06 PROCEDURE — 97161 PT EVAL LOW COMPLEX 20 MIN: CPT

## 2023-12-06 PROCEDURE — 97110 THERAPEUTIC EXERCISES: CPT

## 2023-12-06 NOTE — TELEPHONE ENCOUNTER
Patient called office asking if the last physician she seen could put in a referral to Pain Management for her. She needs someone who can prescribe her pain meds. Patient also would like office to know she started PT today.

## 2023-12-07 PROBLEM — M12.812 ROTATOR CUFF ARTHROPATHY, LEFT: Status: ACTIVE | Noted: 2023-12-07

## 2023-12-08 ENCOUNTER — TELEPHONE (OUTPATIENT)
Dept: ORTHOPEDIC SURGERY | Age: 43
End: 2023-12-08

## 2023-12-08 NOTE — TELEPHONE ENCOUNTER
Spoke with patient and gave her the numbers for both Orthopedic and Pain Management.  Patient was informed of normal US result and was told to take Tylenol for pain with PT.

## 2023-12-08 NOTE — TELEPHONE ENCOUNTER
Called patient in reference to appointment on 12/13/2023. Patient states she will be using private insurance, NOT workers compensation. States [facility] case is closed.

## 2024-01-31 ENCOUNTER — HOSPITAL ENCOUNTER (OUTPATIENT)
Dept: PAIN MANAGEMENT | Age: 44
Discharge: HOME | End: 2024-01-31
Payer: COMMERCIAL

## 2024-01-31 VITALS — BODY MASS INDEX: 42.85 KG/M2 | HEIGHT: 64 IN | WEIGHT: 251 LBS

## 2024-01-31 DIAGNOSIS — M25.512 CHRONIC LEFT SHOULDER PAIN: Primary | ICD-10-CM

## 2024-01-31 DIAGNOSIS — G89.29 CHRONIC LEFT SHOULDER PAIN: Primary | ICD-10-CM

## 2024-01-31 DIAGNOSIS — M67.912 TENDINOPATHY OF LEFT ROTATOR CUFF: ICD-10-CM

## 2024-01-31 DIAGNOSIS — E66.01 CLASS 3 SEVERE OBESITY WITH BODY MASS INDEX (BMI) OF 40.0 TO 44.9 IN ADULT, UNSPECIFIED OBESITY TYPE, UNSPECIFIED WHETHER SERIOUS COMORBIDITY PRESENT (HCC): ICD-10-CM

## 2024-01-31 PROBLEM — E66.813 CLASS 3 SEVERE OBESITY WITH BODY MASS INDEX (BMI) OF 40.0 TO 44.9 IN ADULT: Status: ACTIVE | Noted: 2020-09-29

## 2024-01-31 PROCEDURE — 99213 OFFICE O/P EST LOW 20 MIN: CPT

## 2024-01-31 PROCEDURE — 99203 OFFICE O/P NEW LOW 30 MIN: CPT

## 2024-01-31 PROCEDURE — 99204 OFFICE O/P NEW MOD 45 MIN: CPT | Performed by: STUDENT IN AN ORGANIZED HEALTH CARE EDUCATION/TRAINING PROGRAM

## 2024-01-31 RX ORDER — ETODOLAC 400 MG/1
400 TABLET, FILM COATED ORAL 2 TIMES DAILY PRN
Qty: 28 TABLET | Refills: 0 | Status: SHIPPED | OUTPATIENT
Start: 2024-01-31 | End: 2025-01-30

## 2024-01-31 NOTE — PROGRESS NOTES
Chronic Pain Clinic Note     Encounter Date: 2024     SUBJECTIVE:  Chief Complaint   Patient presents with    Shoulder Pain    New Patient       History of Present Illness:   Shelia Carver is a 43 y.o. female who presents with Shoulder pain     Medication Refill: N/A     Current Complaints of Pain:   Location: Left Shoulder Pain    Radiation: up into neck and down left arm   Severity: moderate   Pain Numerical Score - 4/5   Average: 4/5     Highest: 10  Lowest: 2/3  Character/Quality: Complains of pain that is aching and tingling  Timing: Morning, Constant  Associated symptoms: weakness  Numbness: left pinky   Weakness: left shoulder/ arm/ left hand   Exacerbating factors: sleeping on left side, lifting, pulling, holding left arm up for a period of time   Alleviating factors: motorin  Length of time pain has been present: Started on years   Inciting event/injury: fell in   Bowel/Bladder incontinence: no   Falls:    Physical Therapy: none on file     History of Interventions:   Surgery: No previous lumbar/cervical surgeries  Injections: None    Imagin2023 MRI Left Shoulder     Past Medical History:   Diagnosis Date    Anxiety     CAD (coronary artery disease)     [facility] LAST APPT     Claustrophobia     COVID-19     oct 3,2023  mild,cough congestion few days    COVID-19 vaccination not done     Gestational diabetes     with 3 pregnancies    Hypertension     Latex allergy     Shoulder pain, left     Under care of team     pcp dr huerta    Wears glasses        Past Surgical History:   Procedure Laterality Date    ACHILLES TENDON SURGERY      CARDIAC SURGERY  2020    Xience stent placement -1.5/3T safe - [facility]    CARPAL TUNNEL RELEASE       SECTION      x3    MRI UPPER EXTREMITY W OR WO CONTRAST  2023    SHOULDER LEFT W WO CONTRAST       Family History   Problem Relation Age of Onset    Liver Disease Mother     Cancer Father         bowel    Sickle Cell Anemia Sister

## 2024-03-11 ENCOUNTER — HOSPITAL ENCOUNTER (EMERGENCY)
Age: 44
Discharge: HOME | End: 2024-03-11

## 2024-03-11 VITALS
BODY MASS INDEX: 37.56 KG/M2 | DIASTOLIC BLOOD PRESSURE: 79 MMHG | TEMPERATURE: 98.4 F | RESPIRATION RATE: 20 BRPM | WEIGHT: 220 LBS | HEART RATE: 74 BPM | HEIGHT: 64 IN | SYSTOLIC BLOOD PRESSURE: 161 MMHG | OXYGEN SATURATION: 98 %

## 2024-03-11 ASSESSMENT — PAIN SCALES - GENERAL: PAINLEVEL_OUTOF10: 7

## 2024-03-11 ASSESSMENT — LIFESTYLE VARIABLES
HOW OFTEN DO YOU HAVE A DRINK CONTAINING ALCOHOL: 2-4 TIMES A MONTH
HOW MANY STANDARD DRINKS CONTAINING ALCOHOL DO YOU HAVE ON A TYPICAL DAY: 1 OR 2

## 2024-03-11 ASSESSMENT — PAIN DESCRIPTION - LOCATION: LOCATION: HAND

## 2024-03-11 ASSESSMENT — PAIN - FUNCTIONAL ASSESSMENT: PAIN_FUNCTIONAL_ASSESSMENT: 0-10

## 2024-03-11 ASSESSMENT — PAIN DESCRIPTION - ORIENTATION: ORIENTATION: RIGHT

## 2024-03-13 ENCOUNTER — HOSPITAL ENCOUNTER (EMERGENCY)
Age: 44
Discharge: HOME | End: 2024-03-13
Attending: EMERGENCY MEDICINE

## 2024-03-13 VITALS
WEIGHT: 230 LBS | RESPIRATION RATE: 15 BRPM | HEIGHT: 64 IN | DIASTOLIC BLOOD PRESSURE: 88 MMHG | OXYGEN SATURATION: 97 % | BODY MASS INDEX: 39.27 KG/M2 | HEART RATE: 76 BPM | TEMPERATURE: 97.8 F | SYSTOLIC BLOOD PRESSURE: 155 MMHG

## 2024-03-13 DIAGNOSIS — T30.4 CHEMICAL BURN: Primary | ICD-10-CM

## 2024-03-13 LAB — GLUCOSE BLD-MCNC: 103 MG/DL (ref 65–105)

## 2024-03-13 PROCEDURE — 82947 ASSAY GLUCOSE BLOOD QUANT: CPT

## 2024-03-13 PROCEDURE — 99283 EMERGENCY DEPT VISIT LOW MDM: CPT

## 2024-03-13 RX ORDER — BACITRACIN ZINC AND POLYMYXIN B SULFATE 500; 1000 [USP'U]/G; [USP'U]/G
OINTMENT TOPICAL
Qty: 28.4 G | Refills: 0 | Status: SHIPPED | OUTPATIENT
Start: 2024-03-13

## 2024-03-13 ASSESSMENT — PAIN SCALES - GENERAL: PAINLEVEL_OUTOF10: 6

## 2024-03-13 ASSESSMENT — LIFESTYLE VARIABLES
HOW MANY STANDARD DRINKS CONTAINING ALCOHOL DO YOU HAVE ON A TYPICAL DAY: PATIENT DOES NOT DRINK
HOW OFTEN DO YOU HAVE A DRINK CONTAINING ALCOHOL: NEVER

## 2024-03-13 ASSESSMENT — PAIN - FUNCTIONAL ASSESSMENT: PAIN_FUNCTIONAL_ASSESSMENT: 0-10
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
## 2024-05-01 DIAGNOSIS — I10 ESSENTIAL HYPERTENSION: ICD-10-CM

## 2024-05-02 NOTE — TELEPHONE ENCOUNTER
Trichomonosis     Thickened endometrium     Ego-dystonic lesbianism     History of palpitations     Anxiety     Need for prophylactic vaccination against diphtheria-tetanus-pertussis (DTP)     Traumatic incomplete tear of left rotator cuff     Rotator cuff arthropathy, left     Tendinopathy of left rotator cuff

## 2024-06-27 ENCOUNTER — OFFICE VISIT (OUTPATIENT)
Dept: FAMILY MEDICINE CLINIC | Age: 44
End: 2024-06-27

## 2024-06-27 VITALS
SYSTOLIC BLOOD PRESSURE: 130 MMHG | BODY MASS INDEX: 42.78 KG/M2 | WEIGHT: 250.6 LBS | HEIGHT: 64 IN | DIASTOLIC BLOOD PRESSURE: 70 MMHG | OXYGEN SATURATION: 97 % | HEART RATE: 64 BPM

## 2024-06-27 DIAGNOSIS — I10 PRIMARY HYPERTENSION: Primary | ICD-10-CM

## 2024-06-27 DIAGNOSIS — Z11.59 NEED FOR HEPATITIS C SCREENING TEST: ICD-10-CM

## 2024-06-27 DIAGNOSIS — E66.01 CLASS 3 SEVERE OBESITY DUE TO EXCESS CALORIES WITHOUT SERIOUS COMORBIDITY WITH BODY MASS INDEX (BMI) OF 40.0 TO 44.9 IN ADULT (HCC): ICD-10-CM

## 2024-06-27 DIAGNOSIS — S46.012D TRAUMATIC INCOMPLETE TEAR OF LEFT ROTATOR CUFF, SUBSEQUENT ENCOUNTER: ICD-10-CM

## 2024-06-27 DIAGNOSIS — R06.83 SNORING: ICD-10-CM

## 2024-06-27 DIAGNOSIS — Z11.4 ENCOUNTER FOR SCREENING FOR HIV: ICD-10-CM

## 2024-06-27 PROCEDURE — 3075F SYST BP GE 130 - 139MM HG: CPT | Performed by: STUDENT IN AN ORGANIZED HEALTH CARE EDUCATION/TRAINING PROGRAM

## 2024-06-27 PROCEDURE — 3078F DIAST BP <80 MM HG: CPT | Performed by: STUDENT IN AN ORGANIZED HEALTH CARE EDUCATION/TRAINING PROGRAM

## 2024-06-27 PROCEDURE — 99214 OFFICE O/P EST MOD 30 MIN: CPT | Performed by: STUDENT IN AN ORGANIZED HEALTH CARE EDUCATION/TRAINING PROGRAM

## 2024-06-27 RX ORDER — DULAGLUTIDE 0.75 MG/.5ML
0.75 INJECTION, SOLUTION SUBCUTANEOUS WEEKLY
Qty: 2 ML | Refills: 0 | Status: SHIPPED | OUTPATIENT
Start: 2024-06-27

## 2024-06-27 ASSESSMENT — ENCOUNTER SYMPTOMS
VOMITING: 0
COLOR CHANGE: 0
COUGH: 0
ABDOMINAL PAIN: 0
DIARRHEA: 0
SHORTNESS OF BREATH: 0
NAUSEA: 0

## 2024-06-27 NOTE — PROGRESS NOTES
Subjective:    Shelia Carver is a 44 y.o. female with  has a past medical history of Anxiety, CAD (coronary artery disease), Claustrophobia, COVID-19, COVID-19 vaccination not done, Gestational diabetes, Hypertension, Latex allergy, Shoulder pain, left, Under care of team, and Wears glasses.    Presented to the office today for:  Chief Complaint   Patient presents with    Hypertension    Coronary Artery Disease     Follow up        HPI    Obesity  BMI 43  Has tried losing weight without much luck  Has tried increading activity levels and changing diet  Wants medications  No alcohol  No pancreattitis hx  Quit smoking a year ago which may have contributed to her recent weight gain    HTN  stable  Last visit patient lisinopril was discontinued due to side effects  Patient currently is on Lasix 20 mg daily  She also takes Lopressor 25 mg twice daily  Kidney function was reviewed and normal    Left shoulder pain MRI showed rotator cuff partial tear pressure was referred over to physical therapy  Will refer to orthopedic as well  Patient was referred to pain management in the past    Patient has history of cardiac stents placed  No CP or SOB reported   Currently is on aspirin and Lipitor    Preventive  Patient needs colon cancer screening as well as breast cancer screening        Review of Systems   Constitutional:  Positive for fatigue.   Respiratory:  Negative for cough and shortness of breath.    Cardiovascular:  Negative for chest pain and leg swelling.   Gastrointestinal:  Negative for abdominal pain, diarrhea, nausea and vomiting.   Genitourinary:  Negative for difficulty urinating.   Musculoskeletal:  Positive for arthralgias.   Skin:  Negative for color change.   Neurological:  Negative for dizziness.   Psychiatric/Behavioral:  Negative for agitation.                  The patient has a   Family History   Problem Relation Age of Onset    Liver Disease Mother     Cancer Father         bowel    Sickle Cell Anemia

## 2024-06-27 NOTE — PATIENT INSTRUCTIONS
Thank you for letting us take care of you today. We hope all your questions were addressed. If a question was overlooked or something else comes to mind after you return home, please contact a member of your Care Team listed below.      Your Care Team at [facility] is Team #2  Evangelina Manzo M.D. (Faculty)  Casa Mckinley M.D. (Resident)  Monica Bowman M.D. (Resident)  Mabel Bateman M.D. (Resident)  Mary Lewis M.D. (Resident)  Liv Burris M.D. (Resident)  Kenan Chamorro, [facility]  Vanessa Be, FAVIO Carver, [facility]  Pati Sandoval,  [facility]  Sheron Celaya, [facility]  Pam Perla, FAVIO Freire, [facility]  Sammi (LJ) Lyle FAVIO (Clinical Practice Manager)  Clara Martel [facility] (Clinical Pharmacist)     Office phone number: [phone]    If you need to get in right away due to illness, please be advised we have \"Same Day\" appointments available Monday-Friday. Please call us at [phone] option #3 to schedule your \"Same Day\" appointment.

## 2024-06-27 NOTE — PROGRESS NOTES
HYPERTENSION visit     BP Readings from Last 3 Encounters:   03/13/24 (!) 155/88   11/16/23 134/75   11/07/23 (!) 159/107       HDL (mg/dL)   Date Value   02/09/2023 47     BUN (mg/dL)   Date Value   02/09/2023 8     Creatinine (mg/dL)   Date Value   02/09/2023 0.59     POC Creatinine (mg/dL)   Date Value   11/07/2023 0.4 (L)     Glucose (mg/dL)   Date Value   02/09/2023 96              Have you changed or started any medications since your last visit including any over-the-counter medicines, vitamins, or herbal medicines? no   Have you stopped taking any of your medications? Is so, why? -  no  Are you having any side effects from any of your medications? - no  How often do you miss doses of your medication? no      Have you seen any other physician or provider since your last visit?  yes - Pain management , physical therapy    Have you had any other diagnostic tests since your last visit?  yes - labs , US   Have you been seen in the emergency room and/or had an admission in a hospital since we last saw you?  yes - [facility]    Have you had your routine dental cleaning in the past 6 months?  no     Do you have an active MyChart account? If no, what is the barrier?  Yes    Patient Care Team:  Evangelina Manzo MD as PCP - General (Family Medicine)  Evangelina Manzo MD as PCP - Empaneled Provider  Uriah Pierce MD as Surgeon (Cardiology)  Charity Cordova MD as Consulting Physician (Cardiology)    Medical History Review  Past Medical, Family, and Social History reviewed and does not contribute to the patient presenting condition    Health Maintenance   Topic Date Due    COVID-19 Vaccine (1) Never done    HIV screen  Never done    Hepatitis C screen  Never done    Breast cancer screen  10/22/2022    Hepatitis B vaccine (2 of 3 - 19+ 3-dose series) 12/14/2023    Lipids  02/09/2024    Colorectal Cancer Screen  Never done    Flu vaccine (Season Ended) 08/01/2024    Depression Screen  11/16/2024    Cervical cancer screen

## 2024-07-01 ENCOUNTER — TELEPHONE (OUTPATIENT)
Dept: ORTHOPEDIC SURGERY | Age: 44
End: 2024-07-01
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
## 2024-07-05 NOTE — TELEPHONE ENCOUNTER
Per patient this is not a [facility] claim, she never file any paperwork for [facility] the referral was sent by her Dr Manzo.

## 2024-07-11 ENCOUNTER — HOSPITAL ENCOUNTER (OUTPATIENT)
Age: 44
Setting detail: SPECIMEN
Discharge: HOME | End: 2024-07-11

## 2024-07-11 DIAGNOSIS — I10 PRIMARY HYPERTENSION: ICD-10-CM

## 2024-07-11 DIAGNOSIS — Z11.4 ENCOUNTER FOR SCREENING FOR HIV: ICD-10-CM

## 2024-07-11 DIAGNOSIS — Z11.59 NEED FOR HEPATITIS C SCREENING TEST: ICD-10-CM

## 2024-07-11 LAB
CHOLEST SERPL-MCNC: 186 MG/DL (ref 0–199)
CHOLESTEROL/HDL RATIO: 5
HCV AB SERPL QL IA: NONREACTIVE
HDLC SERPL-MCNC: 37 MG/DL
HIV 1+2 AB+HIV1 P24 AG SERPL QL IA: NONREACTIVE
LDLC SERPL CALC-MCNC: 130 MG/DL (ref 0–100)
TRIGL SERPL-MCNC: 94 MG/DL
VLDLC SERPL CALC-MCNC: 19 MG/DL

## 2024-07-22 ENCOUNTER — APPOINTMENT (OUTPATIENT)
Dept: GENERAL RADIOLOGY | Age: 44
End: 2024-07-22
Payer: COMMERCIAL

## 2024-07-22 ENCOUNTER — HOSPITAL ENCOUNTER (EMERGENCY)
Age: 44
Discharge: HOME | End: 2024-07-22
Attending: EMERGENCY MEDICINE
Payer: COMMERCIAL

## 2024-07-22 VITALS
TEMPERATURE: 97.8 F | SYSTOLIC BLOOD PRESSURE: 190 MMHG | OXYGEN SATURATION: 100 % | DIASTOLIC BLOOD PRESSURE: 107 MMHG | HEIGHT: 64 IN | WEIGHT: 232 LBS | HEART RATE: 71 BPM | RESPIRATION RATE: 19 BRPM | BODY MASS INDEX: 39.61 KG/M2

## 2024-07-22 DIAGNOSIS — I10 ESSENTIAL HYPERTENSION: ICD-10-CM

## 2024-07-22 DIAGNOSIS — M25.522 LEFT ELBOW PAIN: Primary | ICD-10-CM

## 2024-07-22 PROCEDURE — 73080 X-RAY EXAM OF ELBOW: CPT

## 2024-07-22 PROCEDURE — 99283 EMERGENCY DEPT VISIT LOW MDM: CPT

## 2024-07-22 PROCEDURE — 93005 ELECTROCARDIOGRAM TRACING: CPT | Performed by: EMERGENCY MEDICINE

## 2024-07-22 PROCEDURE — 6370000000 HC RX 637 (ALT 250 FOR IP): Performed by: STUDENT IN AN ORGANIZED HEALTH CARE EDUCATION/TRAINING PROGRAM

## 2024-07-22 RX ORDER — CYCLOBENZAPRINE HCL 10 MG
10 TABLET ORAL 3 TIMES DAILY PRN
Qty: 21 TABLET | Refills: 0 | Status: SHIPPED | OUTPATIENT
Start: 2024-07-22 | End: 2024-08-01

## 2024-07-22 RX ORDER — LIDOCAINE 4 G/G
1 PATCH TOPICAL DAILY
Qty: 14 PATCH | Refills: 0 | Status: SHIPPED | OUTPATIENT
Start: 2024-07-22 | End: 2024-08-05

## 2024-07-22 RX ORDER — ACETAMINOPHEN 500 MG
1000 TABLET ORAL ONCE
Status: COMPLETED | OUTPATIENT
Start: 2024-07-22 | End: 2024-07-22

## 2024-07-22 RX ORDER — ACETAMINOPHEN 500 MG
1000 TABLET ORAL EVERY 8 HOURS PRN
Qty: 30 TABLET | Refills: 0 | Status: SHIPPED | OUTPATIENT
Start: 2024-07-22

## 2024-07-22 RX ORDER — LIDOCAINE 4 G/G
1 PATCH TOPICAL DAILY
Status: DISCONTINUED | OUTPATIENT
Start: 2024-07-22 | End: 2024-07-23 | Stop reason: HOSPADM

## 2024-07-22 RX ADMIN — ACETAMINOPHEN 1000 MG: 500 TABLET ORAL at 22:31

## 2024-07-23 NOTE — ED NOTES
Pt presents to ED with c/o  arm pain. Pt states she has ha Hx of arm injury and rotator cuff injury to eft arm, pt states she lifted a heavy box and has had increased pain in the arm. Pt can recreate pain with movement. Pt denies chest pain or SOB. Pt is alert, oriented, and ambulatory. Pt states she takes meds for HTTN.

## 2024-07-23 NOTE — ED PROVIDER NOTES
[facility] ED     Emergency Department     Faculty Attestation        I performed a history and physical examination of the patient and discussed management with the resident. I reviewed the resident’s note and agree with the documented findings and plan of care. Any areas of disagreement are noted on the chart. I was personally present for the key portions of any procedures. I have documented in the chart those procedures where I was not present during the key portions. I have reviewed the emergency nurses triage note. I agree with the chief complaint, past medical history, past surgical history, allergies, medications, social and family history as documented unless otherwise noted below.    For mid-level providers such as nurse practitioners as well as physicians assistants:    I have personally seen and evaluated the patient.    I find the patient's history and physical exam are consistent with NP/PA documentation.  I agree with the care provided, treatment rendered, disposition, & follow-up plan.     Additional findings are as noted.    Vital Signs: BP (!) 190/107   Pulse 71   Temp 97.8 °F (36.6 °C)   Resp 19   Ht 1.626 m (5' 4\")   Wt 105.2 kg (232 lb)   LMP 07/15/2024 (Exact Date)   SpO2 100%   BMI 39.82 kg/m²   PCP:  Evangelina Manzo MD    Pertinent Comments:           Critical Care  None          Anjel Brannon MD    Attending Emergency Medicine Physician            Sher Brannon MD  07/22/24 2055

## 2024-07-23 NOTE — ED PROVIDER NOTES
[facility] ED  Emergency Department Encounter  Emergency Medicine Resident     Pt Name:Shelia Carver  MRN: [id number]  Birthdate 1980  Date of evaluation: 24  PCP:  Evangelina Manzo MD  Note Started: 8:37 PM EDT      CHIEF COMPLAINT       Chief Complaint   Patient presents with    Shoulder Pain     Left    Arm Pain       HISTORY OF PRESENT ILLNESS  (Location/Symptom, Timing/Onset, Context/Setting, Quality, Duration, Modifying Factors, Severity.)      Shelia Carver is a 44 y.o. female CAD, hypertension, diabetes, complaining of left elbow pain. Onset was two days ago. On Friday, she was picking  up a heavy bag, and this caused her elbow to 'pop'. This does occur routinely for her, but typically the pain does not persist. She also complains of paresthesias to the ulnar aspect of that arm. She took a dose of Motrin yesterday with limited relief of symptoms. No other bodily injury. Blood pressure is noted to be elevated today 190/107; She is denying chest pain, SOB, nausea, vomiting, diarrhea.     PAST MEDICAL / SURGICAL / SOCIAL / FAMILY HISTORY      has a past medical history of Anxiety, CAD (coronary artery disease), Claustrophobia, COVID-19, COVID-19 vaccination not done, Gestational diabetes, Hypertension, Latex allergy, Shoulder pain, left, Under care of team, and Wears glasses.       has a past surgical history that includes  section; Carpal tunnel release; Achilles tendon surgery; Cardiac surgery (2020); and mri upper extremity w or wo contrast (2023).      Social History     Socioeconomic History    Marital status: Single     Spouse name: Not on file    Number of children: Not on file    Years of education: Not on file    Highest education level: Not on file   Occupational History    Not on file   Tobacco Use    Smoking status: Light Smoker     Current packs/day: 0.00     Types: Cigarettes     Last attempt to quit: 2020     Years since quittin.0

## 2024-07-23 NOTE — DISCHARGE INSTRUCTIONS
You been seen in the emergency department today due to concern for left elbow pain.  Your x-ray was negative for any acute fracture or dislocation.  Symptoms likely related to muscular/tendon/ligament injury.  You will be provided a prescription for lidocaine patch, Tylenol, Flexeril.  Please use caution when taking Flexeril as this may make you drowsy.  Avoid using heavy machinery, driving, or entering a scenario that requires to be fully alert until you are certain that this medication does not cause an excessive amount of drowsiness.    You will be referred to orthopedics for additional assessment of your elbow.    Monitor your symptoms closely at home.  If you develop any concerning symptoms including but not limited to fever, chills, elbow swelling/redness, worsening pain, numbness or weakness, return to emergency department immediately for reassessment.      Of note your blood pressure was elevated during your emergency department visit today.  Please take all prescribed antihypertensives as recommended.  We recommend that you follow-up with your PCP for ongoing surveillance of your blood pressure and medication titration as indicated.

## 2024-07-25 LAB
EKG ATRIAL RATE: 78 BPM
EKG P AXIS: 40 DEGREES
EKG P-R INTERVAL: 216 MS
EKG Q-T INTERVAL: 406 MS
EKG QRS DURATION: 84 MS
EKG QTC CALCULATION (BAZETT): 462 MS
EKG R AXIS: 64 DEGREES
EKG T AXIS: 30 DEGREES
EKG VENTRICULAR RATE: 78 BPM

## 2024-07-26 DIAGNOSIS — G89.29 CHRONIC LEFT SHOULDER PAIN: Primary | ICD-10-CM

## 2024-07-26 DIAGNOSIS — M25.512 CHRONIC LEFT SHOULDER PAIN: Primary | ICD-10-CM

## 2024-07-29 DIAGNOSIS — G89.29 CHRONIC LEFT SHOULDER PAIN: ICD-10-CM

## 2024-07-29 DIAGNOSIS — M25.512 CHRONIC LEFT SHOULDER PAIN: ICD-10-CM

## 2024-07-30 ENCOUNTER — OFFICE VISIT (OUTPATIENT)
Dept: ORTHOPEDIC SURGERY | Age: 44
End: 2024-07-30
Payer: COMMERCIAL

## 2024-07-30 ENCOUNTER — HOSPITAL ENCOUNTER (OUTPATIENT)
Dept: MRI IMAGING | Facility: CLINIC | Age: 44
Discharge: HOME | End: 2024-08-01

## 2024-07-30 VITALS
DIASTOLIC BLOOD PRESSURE: 88 MMHG | HEIGHT: 64 IN | HEART RATE: 61 BPM | SYSTOLIC BLOOD PRESSURE: 151 MMHG | RESPIRATION RATE: 16 BRPM | BODY MASS INDEX: 43.87 KG/M2 | OXYGEN SATURATION: 98 % | WEIGHT: 257 LBS

## 2024-07-30 DIAGNOSIS — M75.112 PARTIAL NONTRAUMATIC RUPTURE OF LEFT ROTATOR CUFF: ICD-10-CM

## 2024-07-30 DIAGNOSIS — G56.22 CUBITAL TUNNEL SYNDROME, LEFT: ICD-10-CM

## 2024-07-30 DIAGNOSIS — S46.309A INJURY OF TRICEPS: Primary | ICD-10-CM

## 2024-07-30 DIAGNOSIS — S46.309A INJURY OF TRICEPS: ICD-10-CM

## 2024-07-30 PROCEDURE — 3077F SYST BP >= 140 MM HG: CPT | Performed by: PHYSICIAN ASSISTANT

## 2024-07-30 PROCEDURE — 3079F DIAST BP 80-89 MM HG: CPT | Performed by: PHYSICIAN ASSISTANT

## 2024-07-30 PROCEDURE — 99204 OFFICE O/P NEW MOD 45 MIN: CPT | Performed by: PHYSICIAN ASSISTANT

## 2024-07-30 ASSESSMENT — ENCOUNTER SYMPTOMS
CONSTIPATION: 0
DIARRHEA: 0
VOMITING: 0
CHEST TIGHTNESS: 0
COLOR CHANGE: 0
NAUSEA: 0
GASTROINTESTINAL NEGATIVE: 1
APNEA: 0
SHORTNESS OF BREATH: 0
ABDOMINAL PAIN: 0
RESPIRATORY NEGATIVE: 1
ABDOMINAL DISTENTION: 0
COUGH: 0

## 2024-07-30 NOTE — PROGRESS NOTES
Patient was unable to do the stat MRI due to claustrophobia.  We decided to try a stat musculoskeletal diagnostic ultrasound of the left elbow.

## 2024-07-30 NOTE — PROGRESS NOTES
Transportation     Lack of Transportation (Non-Medical): No   Housing Stability: Unknown (2/9/2023)    Housing Stability Vital Sign     Unstable Housing in the Last Year: No       Family History:  Family History   Problem Relation Age of Onset    Liver Disease Mother     Cancer Father         bowel    Sickle Cell Anemia Sister     Asthma Son        I have reviewed the CC, HPI, ROS, PMH, FHX, Social History, and if not present in this note, I have reviewed in the patient's chart.   I agree with the documentation provided by other staff and have reviewed their documentation prior to providing my signature indicating agreement.    Vitals:   BP (!) 151/88   Pulse 61   Resp 16   Ht 1.626 m (5' 4\")   Wt 116.6 kg (257 lb)   LMP 07/15/2024 (Exact Date)   SpO2 98%   BMI 44.11 kg/m²  Body mass index is 44.11 kg/m².  Physical Examination:     Orthopedics:    GENERAL: Alert and oriented X3 in no acute distress.  SKIN: Intact without lesions or ulcerations.  NEURO: Musculoskeletal and axillary nerves intact to sensory and motor testing.  VASC: Capillary refill is less than 3 seconds.    Left Shoulder Exam    GEN: Alert and oriented X 3, in no acute distress.  SKIN: Intact without rashes, lesions, or ulcerations.   NEURO: Musculoskeletal ans axillary nerves intact to sensory and motor testing.  VASC: Cap refill less than than 3 secs. Negative Adson's test, Negative Kristen's test.  ROM: 140 degrees of forward elevation, 45 degrees of external rotation in neutral, 90 degrees of external rotation in abduction, internal rotation to L1.    STRENGTH: Supraspinatus 4/5, external rotators 4/5.    MUSC: No atrophy, negative subscap lift off or belly press test.  IMP: + Neer's sign, + Hawkin's sign, no Coracoid impingement, + painful arc, + pain with cross body abduction.  PALP: no pain over anterolateral acromion, + pain over AC joint, + pain over trapezius trigger point.  INST: + [place]'s test, + Speed's test    ELBOW

## 2024-08-01 ENCOUNTER — OFFICE VISIT (OUTPATIENT)
Dept: FAMILY MEDICINE CLINIC | Age: 44
End: 2024-08-01
Payer: COMMERCIAL

## 2024-08-01 VITALS
OXYGEN SATURATION: 99 % | HEART RATE: 63 BPM | DIASTOLIC BLOOD PRESSURE: 77 MMHG | HEIGHT: 64 IN | BODY MASS INDEX: 44.11 KG/M2 | SYSTOLIC BLOOD PRESSURE: 144 MMHG

## 2024-08-01 DIAGNOSIS — I10 ESSENTIAL HYPERTENSION: ICD-10-CM

## 2024-08-01 DIAGNOSIS — E66.01 CLASS 3 SEVERE OBESITY DUE TO EXCESS CALORIES WITHOUT SERIOUS COMORBIDITY WITH BODY MASS INDEX (BMI) OF 40.0 TO 44.9 IN ADULT (HCC): ICD-10-CM

## 2024-08-01 DIAGNOSIS — M67.912 TENDINOPATHY OF LEFT ROTATOR CUFF: Primary | ICD-10-CM

## 2024-08-01 PROCEDURE — 3078F DIAST BP <80 MM HG: CPT | Performed by: STUDENT IN AN ORGANIZED HEALTH CARE EDUCATION/TRAINING PROGRAM

## 2024-08-01 PROCEDURE — 99213 OFFICE O/P EST LOW 20 MIN: CPT | Performed by: STUDENT IN AN ORGANIZED HEALTH CARE EDUCATION/TRAINING PROGRAM

## 2024-08-01 PROCEDURE — 3077F SYST BP >= 140 MM HG: CPT | Performed by: STUDENT IN AN ORGANIZED HEALTH CARE EDUCATION/TRAINING PROGRAM

## 2024-08-01 RX ORDER — FUROSEMIDE 20 MG/1
20 TABLET ORAL DAILY
Qty: 60 TABLET | Refills: 3 | Status: SHIPPED | OUTPATIENT
Start: 2024-08-01

## 2024-08-01 SDOH — ECONOMIC STABILITY: FOOD INSECURITY: WITHIN THE PAST 12 MONTHS, THE FOOD YOU BOUGHT JUST DIDN'T LAST AND YOU DIDN'T HAVE MONEY TO GET MORE.: NEVER TRUE

## 2024-08-01 SDOH — ECONOMIC STABILITY: FOOD INSECURITY: WITHIN THE PAST 12 MONTHS, YOU WORRIED THAT YOUR FOOD WOULD RUN OUT BEFORE YOU GOT MONEY TO BUY MORE.: NEVER TRUE

## 2024-08-01 SDOH — ECONOMIC STABILITY: INCOME INSECURITY: HOW HARD IS IT FOR YOU TO PAY FOR THE VERY BASICS LIKE FOOD, HOUSING, MEDICAL CARE, AND HEATING?: SOMEWHAT HARD

## 2024-08-01 ASSESSMENT — ENCOUNTER SYMPTOMS
PHOTOPHOBIA: 0
APNEA: 0
COUGH: 0
SHORTNESS OF BREATH: 0
DIARRHEA: 0
WHEEZING: 0
CONSTIPATION: 0
ABDOMINAL PAIN: 0
VOMITING: 0
NAUSEA: 0
COLOR CHANGE: 0

## 2024-08-01 ASSESSMENT — PATIENT HEALTH QUESTIONNAIRE - PHQ9
SUM OF ALL RESPONSES TO PHQ QUESTIONS 1-9: 0
1. LITTLE INTEREST OR PLEASURE IN DOING THINGS: NOT AT ALL
2. FEELING DOWN, DEPRESSED OR HOPELESS: NOT AT ALL
SUM OF ALL RESPONSES TO PHQ9 QUESTIONS 1 & 2: 0
SUM OF ALL RESPONSES TO PHQ QUESTIONS 1-9: 0

## 2024-08-01 NOTE — PROGRESS NOTES
Subjective:    Shelia Carver is a 44 y.o. female with  has a past medical history of Anxiety, CAD (coronary artery disease), Claustrophobia, COVID-19, COVID-19 vaccination not done, Gestational diabetes, Hypertension, Latex allergy, Shoulder pain, left, Under care of team, and Wears glasses.    Presented to the office today for:  Chief Complaint   Patient presents with    Hypertension    Weight Management     Follow up       HPI    HTN  stable  Patient currently is on Lasix 20 mg daily  She also takes Lopressor 25 mg twice daily  Kidney function was reviewed and normal    Left rotator cuff partial tear  Seen ortho recently and plan for steroid injections and continue PT    Weight management   Waiting for pre-auth for Trulicity      Review of Systems   Constitutional:  Negative for activity change, appetite change, fatigue and fever.   Eyes:  Negative for photophobia and visual disturbance.   Respiratory:  Negative for apnea, cough, shortness of breath and wheezing.    Cardiovascular:  Negative for chest pain, palpitations and leg swelling.   Gastrointestinal:  Negative for abdominal pain, constipation, diarrhea, nausea and vomiting.   Endocrine: Negative for polyphagia and polyuria.   Genitourinary:  Negative for dysuria and urgency.   Musculoskeletal:  Positive for arthralgias. Negative for joint swelling.   Skin:  Negative for color change, pallor and rash.   Neurological:  Negative for dizziness, tremors and weakness.   Psychiatric/Behavioral:  Negative for agitation, behavioral problems, confusion, decreased concentration and dysphoric mood.                  The patient has a   Family History   Problem Relation Age of Onset    Liver Disease Mother     Cancer Father         bowel    Sickle Cell Anemia Sister     Asthma Son        Objective:    BP (!) 144/77 (Site: Right Lower Arm, Position: Sitting, Cuff Size: Medium Adult)   Pulse 63   Ht 1.626 m (5' 4\")   LMP 07/15/2024 (Exact Date)   SpO2 99%   BMI 
Vaccine  Completed    Hepatitis C screen  Completed    HIV screen  Completed    Hepatitis A vaccine  Aged Out    Hib vaccine  Aged Out    HPV vaccine  Aged Out    Polio vaccine  Aged Out    Meningococcal (ACWY) vaccine  Aged Out    Diabetes screen  Discontinued

## 2024-08-01 NOTE — PATIENT INSTRUCTIONS
appointments available Monday-Friday. Please call us at [phone] option #3 to schedule your \"Same Day\" appointment.

## 2024-10-07 ENCOUNTER — TELEPHONE (OUTPATIENT)
Dept: FAMILY MEDICINE CLINIC | Age: 44
End: 2024-10-07

## 2024-10-07 NOTE — TELEPHONE ENCOUNTER
PA has been submitted for- [employer]  Insurance- [facility] [employer]  Waiting on response back

## 2024-11-04 NOTE — TELEPHONE ENCOUNTER
[facility] denied Trulicity-Your plan only covers this drug when it is used for certain health conditions. Covered use is for an FDAapproved or compendia supported use. Your plan does not cover the drug for your health condition that your doctor told us you have. We reviewed the information we had. Your request has been denied. Your doctor can send us any new or missing information for us to review. For this drug, you may have to meet other criteria. You can request the drug policy for more details. You can also request other plan documents for your review. Possible alternative drug(s) may be: metformin. Please Advise

## 2024-11-23 ENCOUNTER — HOSPITAL ENCOUNTER (EMERGENCY)
Age: 44
Discharge: HOME | End: 2024-11-23
Attending: EMERGENCY MEDICINE
Payer: COMMERCIAL

## 2024-11-23 ENCOUNTER — APPOINTMENT (OUTPATIENT)
Dept: GENERAL RADIOLOGY | Age: 44
End: 2024-11-23
Payer: COMMERCIAL

## 2024-11-23 VITALS
RESPIRATION RATE: 18 BRPM | SYSTOLIC BLOOD PRESSURE: 157 MMHG | HEART RATE: 77 BPM | OXYGEN SATURATION: 97 % | BODY MASS INDEX: 39.48 KG/M2 | DIASTOLIC BLOOD PRESSURE: 85 MMHG | WEIGHT: 230 LBS | TEMPERATURE: 97.8 F

## 2024-11-23 DIAGNOSIS — M77.50 ENTHESOPATHY OF ANKLE: Primary | ICD-10-CM

## 2024-11-23 PROCEDURE — 73610 X-RAY EXAM OF ANKLE: CPT

## 2024-11-23 PROCEDURE — 73562 X-RAY EXAM OF KNEE 3: CPT

## 2024-11-23 PROCEDURE — 73630 X-RAY EXAM OF FOOT: CPT

## 2024-11-23 PROCEDURE — 99283 EMERGENCY DEPT VISIT LOW MDM: CPT

## 2024-11-23 PROCEDURE — 6370000000 HC RX 637 (ALT 250 FOR IP): Performed by: STUDENT IN AN ORGANIZED HEALTH CARE EDUCATION/TRAINING PROGRAM

## 2024-11-23 RX ORDER — ACETAMINOPHEN 500 MG
1000 TABLET ORAL ONCE
Status: COMPLETED | OUTPATIENT
Start: 2024-11-23 | End: 2024-11-23

## 2024-11-23 RX ORDER — IBUPROFEN 400 MG/1
600 TABLET, FILM COATED ORAL ONCE
Status: DISCONTINUED | OUTPATIENT
Start: 2024-11-23 | End: 2024-11-23

## 2024-11-23 RX ORDER — ACETAMINOPHEN 500 MG
1000 TABLET ORAL EVERY 8 HOURS PRN
Qty: 10 TABLET | Refills: 0 | Status: SHIPPED | OUTPATIENT
Start: 2024-11-23

## 2024-11-23 RX ADMIN — ACETAMINOPHEN 1000 MG: 500 TABLET ORAL at 06:55

## 2024-11-23 ASSESSMENT — ENCOUNTER SYMPTOMS
COLOR CHANGE: 0
ABDOMINAL PAIN: 0
BACK PAIN: 0
WHEEZING: 0
COUGH: 0
SHORTNESS OF BREATH: 0
NAUSEA: 0
VOMITING: 0
DIARRHEA: 0

## 2024-11-23 ASSESSMENT — PAIN SCALES - GENERAL: PAINLEVEL_OUTOF10: 10

## 2024-11-23 ASSESSMENT — PAIN DESCRIPTION - LOCATION: LOCATION: KNEE

## 2024-11-23 ASSESSMENT — PAIN DESCRIPTION - ORIENTATION: ORIENTATION: RIGHT

## 2024-11-23 NOTE — ED PROVIDER NOTES
Never    Smokeless tobacco: Never   Substance and Sexual Activity    Alcohol use: Yes     Comment: social    Drug use: Yes     Types: Marijuana (Weed)    Sexual activity: Not Currently   Other Topics Concern    Not on file   Social History Narrative    Not on file     Social Determinants of Health     Financial Resource Strain: Medium Risk (8/1/2024)    Overall Financial Resource Strain (CARDIA)     Difficulty of Paying Living Expenses: Somewhat hard   Food Insecurity: No Food Insecurity (8/1/2024)    Hunger Vital Sign     Worried About Running Out of Food in the Last Year: Never true     Ran Out of Food in the Last Year: Never true   Transportation Needs: Unknown (8/1/2024)    PRAPARE - Transportation     Lack of Transportation (Medical): Not on file     Lack of Transportation (Non-Medical): No   Physical Activity: Not on file   Stress: Not on file   Social Connections: Not on file   Intimate Partner Violence: Not on file   Housing Stability: Unknown (8/1/2024)    Housing Stability Vital Sign     Unable to Pay for Housing in the Last Year: Not on file     Number of Places Lived in the Last Year: Not on file     Unstable Housing in the Last Year: No       Family History   Problem Relation Age of Onset    Liver Disease Mother     Cancer Father         bowel    Sickle Cell Anemia Sister     Asthma Son        Allergies:  Latex    Home Medications:  Prior to Admission medications    Medication Sig Start Date End Date Taking? Authorizing Provider   acetaminophen (TYLENOL) 500 MG tablet Take 2 tablets by mouth every 8 hours as needed for Pain 11/23/24  Yes Dontae Brown, DO   metoprolol tartrate (LOPRESSOR) 25 MG tablet Take 1 tablet by mouth 2 times daily 8/1/24   Evangelina Manzo MD   furosemide (LASIX) 20 MG tablet Take 1 tablet by mouth daily 8/1/24   Evangelina Manzo MD   dulaglutide (TRULICITY) 0.75 MG/0.5ML SOPN SC injection Inject 0.5 mLs into the skin once a week 6/27/24   Evangelina Manzo MD   bacitracin-polymyxin b  normal.      Nose: Nose normal. No congestion.      Mouth/Throat:      Mouth: Mucous membranes are moist.      Pharynx: Oropharynx is clear. No posterior oropharyngeal erythema.   Eyes:      General:         Right eye: No discharge.         Left eye: No discharge.      Extraocular Movements: Extraocular movements intact.      Conjunctiva/sclera: Conjunctivae normal.      Pupils: Pupils are equal, round, and reactive to light.   Cardiovascular:      Rate and Rhythm: Normal rate and regular rhythm.      Pulses: Normal pulses.      Heart sounds: Normal heart sounds.   Pulmonary:      Effort: Pulmonary effort is normal. No respiratory distress.      Breath sounds: Normal breath sounds. No wheezing.   Abdominal:      General: There is no distension.      Palpations: Abdomen is soft.      Tenderness: There is no abdominal tenderness. There is no guarding or rebound.   Musculoskeletal:      Cervical back: Neck supple.      Comments: Minor right knee effusion.  Knee most tender at patellar tendon.  Tenderness to palpation distal Achilles tendon and plantar aspect of calcaneal bone.  Normal range of motion with right knee flexion and right foot plantarflexion/dorsiflexion.  Right calf with soft compartments.   Skin:     General: Skin is warm and dry.      Findings: No erythema or rash.      Comments: No open wounds or bleeding   Neurological:      Mental Status: She is alert and oriented to person, place, and time. Mental status is at baseline.      Sensory: No sensory deficit.      Motor: No weakness.      Coordination: Coordination normal.      Gait: Gait normal.      Comments: Sensation intact right lower extremity.  Muscle strength 5/5 right lower extremity   Psychiatric:         Mood and Affect: Mood normal.         Behavior: Behavior normal.         Thought Content: Thought content normal.         Judgment: Judgment normal.           DDX/DIAGNOSTIC RESULTS / EMERGENCY DEPARTMENT COURSE / MDM     Medical Decision

## 2024-11-23 NOTE — ED PROVIDER NOTES
[facility]   Emergency Department  Faculty Attestation       I performed a history and physical examination of the patient and discussed management with the resident. I reviewed the resident’s note and agree with the documented findings including all diagnostic interpretations and plan of care. Any areas of disagreement are noted on the chart. I was personally present for the key portions of any procedures. I have documented in the chart those procedures where I was not present during the key portions. I have reviewed the emergency nurses triage note. I agree with the chief complaint, past medical history, past surgical history, allergies, medications, social and family history as documented unless otherwise noted below.  For Physician Assistant/ Nurse Practitioner cases/documentation I have personally evaluated this patient and have completed at least one if not all key elements of the E/M (history, physical exam, and MDM). Additional findings are as noted.    Patient Name: Shelia Carver  MRN: [id number]  : 1980  Primary Care Physician: Evangelina Manzo MD    Date of evaluationa: 2024   Note Started: 6:48 AM EST    Pertinent Comments     Chief Complaint:   Chief Complaint   Patient presents with   • Knee Pain     right        Initial vitals: (If not listed, please see nursing documentation)  ED Triage Vitals [24 0615]   BP Systolic BP Percentile Diastolic BP Percentile Temp Temp Source Pulse Respirations SpO2   (!) 157/85 -- -- 97.8 °F (36.6 °C) Oral 77 18 97 %      Height Weight - Scale         -- 104.3 kg (230 lb)              HPI/PE/Impression:  This is a 44 y.o. female who presents to the Emergency Department with right knee pain and heel pain.  She is going out the car yesterday and she struck her right knee on accident and felt a cracking like sensation.  Today woke up and was unable to put weight on it.  She is also hurting in her right heel but did not actually directly  injured this yesterday.  On exam, she is awake alert answering questions.  Her right knee does have a small little effusion with some tenderness over the patella.  But able to bend the knee.  No calf pain or tenderness.  There is tenderness over the posterior portion of the heel as well.  But normal range of motion of the foot.  Has had a prior Achilles tendon repair on that side    Medical Decision Making  X-rays    Amount and/or Complexity of Data Reviewed  Radiology: ordered. Decision-making details documented in ED Course.    Risk  OTC drugs.         Critical Care: None     Ewelina Downey MD  Attending Emergency Physician         Ewelina Downey MD  11/27/24 8053

## 2024-11-23 NOTE — ED PROVIDER NOTES
FACULTY SIGN-OUT  ADDENDUM     Care of this patient was assumed from previous attending physician. The patient's initial evaluation and plan have been discussed with the prior provider who initially evaluated the patient.      Attestation  I was available and discussed any additional care issues that arose and coordinated the management plans with the resident(s) caring for the patient during my duty period. Any areas of disagreement with resident's documentation of care or procedures are noted on the chart. I was personally present for the key portions of any/all procedures, during my duty period. I have documented in the chart those procedures where I was not present during the key portions.       ED COURSE      The patient was given the following medications:  Orders Placed This Encounter   Medications    DISCONTD: ibuprofen (ADVIL;MOTRIN) tablet 600 mg    acetaminophen (TYLENOL) tablet 1,000 mg       RECENT VITALS:   Temp: 97.8 °F (36.6 °C), Pulse: 77, Respirations: 18, BP: (!) 157/85    MEDICAL DECISION MAKING        Shelia Carver is a 44 y.o. female who presents to the Emergency Department with complaints of knee pain.  X-rays are negative for any acute pathology patient be discharged      Anjel Brannon MD, MD, F.A.C.E.P.  Attending Emergency Physician    (Please note that portions of this note were completed with a voice recognition program.  Efforts were made to edit the dictations but occasionally words are mis-transcribed.)              Sher Brannon MD  11/23/24 0716

## 2024-11-23 NOTE — DISCHARGE INSTRUCTIONS
Seen in the emergency department for right heel pain after striking your knee yesterday.  Your x-ray imaging showed:  \"IMPRESSION:  Right knee:     No acute fracture or dislocation.     Right ankle:     1. Mild degenerative changes as above.  Mild plantar calcaneal spur.  Mild  distal Achilles enthesopathy.  2. No acute fracture or dislocation.  Right foot:     1. Mild soft tissue swelling and edema of the dorsal right forefoot.  2. Mild degenerative changes of the midfoot.  Mild plantar calcaneal spur.  Mild distal Achilles enthesopathy.  3. No acute fracture or dislocation.\"     The Achilles enthesopathy is inflammation around your Achilles tendon at the insertion site on your heel bone.  Provided walking boot and crutches.  You were unsure of the surgeon who performed the Achilles tendon surgery in the past so providing follow-up with podiatry.  Please follow-up with the podiatry service in 2 days in order to arrange follow-up.  The treatment for this is Motrin however you state this elevate your blood pressure.  I recommend that you rest, ice, elevate this injury and control pain with Tylenol.  Please see the podiatrist as soon as possible for further recommendations.  Please return to the emergency department if you develop worsening pain, numbness and tingling, fevers, skin color changes, further injury, or any other concerning symptoms

## 2024-11-23 NOTE — ED NOTES
Arrived patient to ED presents with knee pain, patient states that when she was opening her car door, the edge of the door hit her right knee. Patient states that pain is radiating to her right leg down to her foot. Painscale of 10/10.patient is able to bend and wiggle. Patient states she has right achilles tendon surgery done before. Has a history of hypertension on medication. Alert and oriented. Bed on lowest position. Call light provided.

## 2024-11-25 ENCOUNTER — OFFICE VISIT (OUTPATIENT)
Dept: PODIATRY | Age: 44
End: 2024-11-25
Payer: COMMERCIAL

## 2024-11-25 VITALS
HEIGHT: 64 IN | WEIGHT: 230 LBS | DIASTOLIC BLOOD PRESSURE: 91 MMHG | SYSTOLIC BLOOD PRESSURE: 158 MMHG | BODY MASS INDEX: 39.27 KG/M2

## 2024-11-25 DIAGNOSIS — M79.671 RIGHT FOOT PAIN: Primary | ICD-10-CM

## 2024-11-25 DIAGNOSIS — M72.2 PLANTAR FASCIITIS: ICD-10-CM

## 2024-11-25 PROCEDURE — 99203 OFFICE O/P NEW LOW 30 MIN: CPT | Performed by: STUDENT IN AN ORGANIZED HEALTH CARE EDUCATION/TRAINING PROGRAM

## 2024-11-25 PROCEDURE — 3077F SYST BP >= 140 MM HG: CPT | Performed by: STUDENT IN AN ORGANIZED HEALTH CARE EDUCATION/TRAINING PROGRAM

## 2024-11-25 PROCEDURE — 3080F DIAST BP >= 90 MM HG: CPT | Performed by: STUDENT IN AN ORGANIZED HEALTH CARE EDUCATION/TRAINING PROGRAM

## 2024-11-25 RX ORDER — ATORVASTATIN CALCIUM 40 MG/1
TABLET, FILM COATED ORAL
Qty: 30 TABLET | Refills: 3 | OUTPATIENT
Start: 2024-11-25

## 2024-11-25 NOTE — PROGRESS NOTES
Patient instructed to remove shoes and socks and instructed to sit in exam chair.  Current PCP is Evangelina Manzo MD and date of last visit was 08/01/2024.   Do you have a follow up visit scheduled?  No  If yes, the date is unknown    
W WO CONTRAST       Social History:  Social History     Tobacco Use    Smoking status: Former     Current packs/day: 0.00     Types: Cigarettes     Quit date: 2020     Years since quittin.3     Passive exposure: Never    Smokeless tobacco: Never   Substance Use Topics    Alcohol use: Yes     Comment: social    Drug use: Yes     Types: Marijuana (Weed)       Medications:  Prior to Admission medications    Medication Sig Start Date End Date Taking? Authorizing Provider   acetaminophen (TYLENOL) 500 MG tablet Take 2 tablets by mouth every 8 hours as needed for Pain 24  Yes Dontae Brown DO   metoprolol tartrate (LOPRESSOR) 25 MG tablet Take 1 tablet by mouth 2 times daily 24  Yes Evangelina Manzo MD   furosemide (LASIX) 20 MG tablet Take 1 tablet by mouth daily 24  Yes Evangelina Manzo MD   dulaglutide (TRULICITY) 0.75 MG/0.5ML SOPN SC injection Inject 0.5 mLs into the skin once a week 24  Yes Evangelina Manzo MD   bacitracin-polymyxin b (POLYSPORIN) 500-64155 UNIT/GM ointment Apply topically 2 times daily. 3/13/24  Yes Phan Rojo MD   etodolac (LODINE) 400 MG tablet Take 1 tablet by mouth 2 times daily as needed (pain) 24 Yes Porfirio Hassan DO   atorvastatin (LIPITOR) 40 MG tablet take 1 tablet by mouth nightly 23  Yes Geri Celis MD   aspirin 81 MG EC tablet Take 1 tablet by mouth daily 23  Yes Evangelina Manzo MD   diclofenac sodium (VOLTAREN) 1 % GEL Apply 4 g topically 4 times daily 20  Yes Agustin Alvarado MD   nitroGLYCERIN (NITROSTAT) 0.4 MG SL tablet Place 1 tablet under the tongue every 5 minutes as needed for Chest pain up to max of 3 total doses. If no relief after 1 dose, call 911. 20  Yes Cristofer Jung APRN - CNP       Objective     Vitals:    24 1549   BP: (!) 158/91       Lab Results   Component Value Date    LABA1C 5.6 2023       Physical Exam:  General:  Alert and oriented x3. In no acute distress.  Presents in cam

## 2024-12-03 NOTE — PROGRESS NOTES
Patient instructed to remove shoes and socks and instructed to sit in exam chair.  Current PCP is Evangelina Manzo MD and date of last visit was 06/27/2024.   Do you have a follow up visit scheduled?  No  If yes, the date is unknown

## 2024-12-09 ENCOUNTER — OFFICE VISIT (OUTPATIENT)
Dept: PODIATRY | Age: 44
End: 2024-12-09
Payer: COMMERCIAL

## 2024-12-09 VITALS
WEIGHT: 230 LBS | BODY MASS INDEX: 39.27 KG/M2 | SYSTOLIC BLOOD PRESSURE: 158 MMHG | HEART RATE: 74 BPM | DIASTOLIC BLOOD PRESSURE: 94 MMHG | HEIGHT: 64 IN

## 2024-12-09 DIAGNOSIS — M79.671 RIGHT FOOT PAIN: Primary | ICD-10-CM

## 2024-12-09 DIAGNOSIS — M76.60 INSERTIONAL ACHILLES TENDINOPATHY: ICD-10-CM

## 2024-12-09 PROCEDURE — 99213 OFFICE O/P EST LOW 20 MIN: CPT | Performed by: PODIATRIST

## 2024-12-09 PROCEDURE — 3077F SYST BP >= 140 MM HG: CPT | Performed by: PODIATRIST

## 2024-12-09 PROCEDURE — 3080F DIAST BP >= 90 MM HG: CPT | Performed by: PODIATRIST

## 2024-12-09 NOTE — PROGRESS NOTES
[facility]  [address]   [address] [place], [place] [address]  Tel: [phone]   Fax: [phone]    Subjective     CC: right ankle pain    Interval history: 12/9/2024  Patient continues to have persistent pain to the right ankle  Patient states she is having pain at the posterior aspect of the right heel, she states that that twinges of pain are similar to when she ruptured her Achilles years ago.  Patient states she is experiencing anxiety and wants to ensure she does not rupture of the Achilles again  Patient continues to have moderate pain on the plantar aspect of the right heel  Patient has tried to transition from CAM boot to normal shoe gear but states it is too painful when she is very active    HPI:  Shelia Carver is a 44 y.o. year old female who presents to clinic today right ankle and foot pain.  Dates she hit her knee last week and began noticing pain to her right posterior ankle and foot.  She presented to the ED and radiographs were obtained which showed no fracture dislocations.  She was given a cam boot walker and advised to follow-up outpatient.  Patient states the pain has decreased after utilizing the cam boot walker.  She states she had a Achilles repair done 2 years ago.  Her pain is mostly to the right plantar foot and is intense when she gets out of bed in the morning and for steps down.  Patient states she has not tried many conservative methods at home.  She would like to have a note to remain off work for the next couple weeks.  Primary care physician is Evangelina Manzo MD.    ROS:    Constitutional: Denies nausea, vomiting, fever, chills.  Neurologic: Denies numbness, tingling, and burning in the feet.    Vascular: Denies symptoms of lower extremity claudication.    Skin: Denies open wounds.  Otherwise negative except as noted in the HPI.     PMH:  Past Medical History:   Diagnosis Date    Anxiety     CAD (coronary artery disease)     [facility] LAST APPT 2/23

## 2024-12-13 DIAGNOSIS — E66.813 CLASS 3 SEVERE OBESITY DUE TO EXCESS CALORIES WITHOUT SERIOUS COMORBIDITY WITH BODY MASS INDEX (BMI) OF 40.0 TO 44.9 IN ADULT: ICD-10-CM

## 2024-12-13 DIAGNOSIS — E66.01 CLASS 3 SEVERE OBESITY DUE TO EXCESS CALORIES WITHOUT SERIOUS COMORBIDITY WITH BODY MASS INDEX (BMI) OF 40.0 TO 44.9 IN ADULT: ICD-10-CM

## 2024-12-16 NOTE — TELEPHONE ENCOUNTER
Last visit: 08/01/2024  Last Med refill: 06/27/2024  Does patient have enough medication for 72 hours: No:     Next Visit Date:  Future Appointments   Date Time Provider Department Center   12/17/2024  8:15 PM [facility] MRI RM 1 (1.5T) [facility]       Health Maintenance   Topic Date Due    Breast cancer screen  10/22/2022    Hepatitis B vaccine (2 of 3 - 19+ 3-dose series) 12/14/2023    Colorectal Cancer Screen  Never done    Flu vaccine (1) 08/01/2024    COVID-19 Vaccine (1 - 2023-24 season) Never done    Lipids  07/11/2025    Depression Screen  08/01/2025    Cervical cancer screen  09/22/2025    DTaP/Tdap/Td vaccine (2 - Td or Tdap) 02/16/2032    Hepatitis C screen  Completed    HIV screen  Completed    Hepatitis A vaccine  Aged Out    Hib vaccine  Aged Out    HPV vaccine  Aged Out    Polio vaccine  Aged Out    Meningococcal (ACWY) vaccine  Aged Out    Pneumococcal 0-64 years Vaccine  Aged Out    Diabetes screen  Discontinued       Hemoglobin A1C (%)   Date Value   02/09/2023 5.6   06/14/2020 5.6             ( goal A1C is < 7)   No components found for: \"LABMICR\"  No components found for: \"LDLCHOLESTEROL\", \"LDLCALC\"    (goal LDL is <100)   AST (U/L)   Date Value   02/09/2023 16     ALT (U/L)   Date Value   02/09/2023 13     BUN (mg/dL)   Date Value   02/09/2023 8     BP Readings from Last 3 Encounters:   12/09/24 (!) 158/94   11/25/24 (!) 158/91   11/23/24 (!) 157/85          (goal 120/80)    All Future Testing planned in [facility]  Lab Frequency Next Occurrence   US EXTREMITY JOINT LEFT NON VASC COMPLETE Once 07/30/2024   MRI ANKLE RIGHT WO CONTRAST Once 12/09/2024               Patient Active Problem List:     Psychosis (HCC)     Hypokalemia     Iron deficiency anemia secondary to inadequate dietary iron intake     Paranoid delusion (HCC)     Essential hypertension     Chest pain     STEMI involving left anterior descending coronary artery (HCC)     Coronary artery disease involving native coronary artery

## 2024-12-17 ENCOUNTER — HOSPITAL ENCOUNTER (OUTPATIENT)
Dept: MRI IMAGING | Age: 44
Discharge: HOME | End: 2024-12-19
Payer: COMMERCIAL

## 2024-12-17 DIAGNOSIS — M79.671 RIGHT FOOT PAIN: ICD-10-CM

## 2024-12-17 PROCEDURE — 73721 MRI JNT OF LWR EXTRE W/O DYE: CPT

## 2024-12-17 RX ORDER — DULAGLUTIDE 0.75 MG/.5ML
INJECTION, SOLUTION SUBCUTANEOUS
Qty: 2 ML | Refills: 2 | Status: SHIPPED | OUTPATIENT
Start: 2024-12-17

## 2024-12-18 DIAGNOSIS — I10 ESSENTIAL HYPERTENSION: ICD-10-CM

## 2024-12-19 NOTE — TELEPHONE ENCOUNTER
Last visit: 8/1/24  Last Med refill: 8/1/24  Does patient have enough medication for 72 hours: no    Next Visit Date:1/9/25  Future Appointments   Date Time Provider Department Center   1/9/2025 10:30 AM Evangelina Manzo MD [facility] DEP       Health Maintenance   Topic Date Due    Breast cancer screen  10/22/2022    Hepatitis B vaccine (2 of 3 - 19+ 3-dose series) 12/14/2023    Colorectal Cancer Screen  Never done    Flu vaccine (1) 08/01/2024    COVID-19 Vaccine (1 - 2023-24 season) Never done    Lipids  07/11/2025    Depression Screen  08/01/2025    Cervical cancer screen  09/22/2025    DTaP/Tdap/Td vaccine (2 - Td or Tdap) 02/16/2032    Hepatitis C screen  Completed    HIV screen  Completed    Hepatitis A vaccine  Aged Out    Hib vaccine  Aged Out    HPV vaccine  Aged Out    Polio vaccine  Aged Out    Meningococcal (ACWY) vaccine  Aged Out    Pneumococcal 0-64 years Vaccine  Aged Out    Diabetes screen  Discontinued       Hemoglobin A1C (%)   Date Value   02/09/2023 5.6   06/14/2020 5.6             ( goal A1C is < 7)   No components found for: \"LABMICR\"  No components found for: \"LDLCHOLESTEROL\", \"LDLCALC\"    (goal LDL is <100)   AST (U/L)   Date Value   02/09/2023 16     ALT (U/L)   Date Value   02/09/2023 13     BUN (mg/dL)   Date Value   02/09/2023 8     BP Readings from Last 3 Encounters:   12/09/24 (!) 158/94   11/25/24 (!) 158/91   11/23/24 (!) 157/85          (goal 120/80)    All Future Testing planned in [facility]  Lab Frequency Next Occurrence   US EXTREMITY JOINT LEFT NON VASC COMPLETE Once 07/30/2024               Patient Active Problem List:     Psychosis (HCC)     Hypokalemia     Iron deficiency anemia secondary to inadequate dietary iron intake     Paranoid delusion (HCC)     Essential hypertension     Chest pain     STEMI involving left anterior descending coronary artery (HCC)     Coronary artery disease involving native coronary artery of native heart without angina pectoris

## 2024-12-23 RX ORDER — METOPROLOL TARTRATE 25 MG/1
25 TABLET, FILM COATED ORAL 2 TIMES DAILY
Qty: 60 TABLET | Refills: 3 | Status: SHIPPED | OUTPATIENT
Start: 2024-12-23

## 2025-01-09 NOTE — PROGRESS NOTES
Patient instructed to remove shoes and socks and instructed to sit in exam chair.  Current PCP is Evangelina Manzo MD and date of last visit was 08/01/2024.   Do you have a follow up visit scheduled?  Yes  If yes, the date is 01/23/71324

## 2025-01-20 ENCOUNTER — OFFICE VISIT (OUTPATIENT)
Dept: PODIATRY | Age: 45
End: 2025-01-20
Payer: COMMERCIAL

## 2025-01-20 VITALS
SYSTOLIC BLOOD PRESSURE: 148 MMHG | WEIGHT: 230 LBS | HEIGHT: 64 IN | BODY MASS INDEX: 39.27 KG/M2 | HEART RATE: 76 BPM | DIASTOLIC BLOOD PRESSURE: 94 MMHG

## 2025-01-20 DIAGNOSIS — M76.60 INSERTIONAL ACHILLES TENDINOPATHY: ICD-10-CM

## 2025-01-20 DIAGNOSIS — G89.29 CHRONIC PAIN OF RIGHT ANKLE: ICD-10-CM

## 2025-01-20 DIAGNOSIS — M25.562 ACUTE PAIN OF LEFT KNEE: ICD-10-CM

## 2025-01-20 DIAGNOSIS — M79.671 RIGHT FOOT PAIN: ICD-10-CM

## 2025-01-20 DIAGNOSIS — M25.571 CHRONIC PAIN OF RIGHT ANKLE: ICD-10-CM

## 2025-01-20 DIAGNOSIS — M67.873 ACHILLES TENDINOSIS OF RIGHT ANKLE: Primary | ICD-10-CM

## 2025-01-20 DIAGNOSIS — M72.2 PLANTAR FASCIITIS: ICD-10-CM

## 2025-01-20 PROCEDURE — 3077F SYST BP >= 140 MM HG: CPT

## 2025-01-20 PROCEDURE — 99213 OFFICE O/P EST LOW 20 MIN: CPT

## 2025-01-20 PROCEDURE — 3080F DIAST BP >= 90 MM HG: CPT

## 2025-01-20 NOTE — PROGRESS NOTES
[facility]  [address]   [address] [place], [place] [address]  Tel: [phone]   Fax: [phone]    Subjective     CC: right ankle pain    Interval history: 12/9/2024    Right achilles pain, was previously offloaded in CAM and crutches. Transitioned to normal shoe gear but still painful. MRI showed tendinosis. Also has right knee pain, popping.      Patient continues to have persistent pain to the right ankle  Patient states she is having pain at the posterior aspect of the right heel, she states that that twinges of pain are similar to when she ruptured her Achilles years ago.  Patient states she is experiencing anxiety and wants to ensure she does not rupture of the Achilles again  Patient continues to have moderate pain on the plantar aspect of the right heel  Patient has tried to transition from CAM boot to normal shoe gear but states it is too painful when she is very active    HPI:  Shelia Carver is a 44 y.o. year old female who presents to clinic today right ankle and foot pain.  Dates she hit her knee last week and began noticing pain to her right posterior ankle and foot.  She presented to the ED and radiographs were obtained which showed no fracture dislocations.  She was given a cam boot walker and advised to follow-up outpatient.  Patient states the pain has decreased after utilizing the cam boot walker.  She states she had a Achilles repair done 2 years ago.  Her pain is mostly to the right plantar foot and is intense when she gets out of bed in the morning and for steps down.  Patient states she has not tried many conservative methods at home.  She would like to have a note to remain off work for the next couple weeks.  Primary care physician is Evangelina Manzo MD.    ROS:    Constitutional: Denies nausea, vomiting, fever, chills.  Neurologic: Denies numbness, tingling, and burning in the feet.    Vascular: Denies symptoms of lower extremity claudication.    Skin: Denies open

## 2025-01-21 NOTE — PROGRESS NOTES
[facility]  [address]   [address] [place], [place] [address]  Tel: [phone]   Fax: [phone]    Subjective     CC: right ankle pain    Interval history:   Patient continues to have pain to right achilles tendon  Patient reports transitioning from CAM boot and crutches to normal shoe gear since previous appointment  Patient reports that she has not been attempting any conservative measures at this time  Patient had MRI since previous appointment which showed tendinosis of the right achilles with no signs of re-rupture  Patient has new complaint of left knee pain and reports a popping sensation to the lateral aspect of the knee.  Patient reports some improvement in pain to plantar right foot  No other complaints    HPI:  Shelia Carver is a 44 y.o. year old female who presents to clinic today right ankle and foot pain.  Dates she hit her knee last week and began noticing pain to her right posterior ankle and foot.  She presented to the ED and radiographs were obtained which showed no fracture dislocations.  She was given a cam boot walker and advised to follow-up outpatient.  Patient states the pain has decreased after utilizing the cam boot walker.  She states she had a Achilles repair done 2 years ago.  Her pain is mostly to the right plantar foot and is intense when she gets out of bed in the morning and for steps down.  Patient states she has not tried many conservative methods at home.  She would like to have a note to remain off work for the next couple weeks.  Primary care physician is Evangelina Manzo MD.    ROS:    Constitutional: Denies nausea, vomiting, fever, chills.  Neurologic: Denies numbness, tingling, and burning in the feet.    Vascular: Denies symptoms of lower extremity claudication.    Skin: Denies open wounds.  Otherwise negative except as noted in the HPI.     PMH:  Past Medical History:   Diagnosis Date    Anxiety     CAD (coronary artery disease)     [facility] LAST APPT 2/23

## 2025-01-23 ENCOUNTER — OFFICE VISIT (OUTPATIENT)
Dept: FAMILY MEDICINE CLINIC | Age: 45
End: 2025-01-23
Payer: COMMERCIAL

## 2025-01-23 VITALS
DIASTOLIC BLOOD PRESSURE: 90 MMHG | HEIGHT: 64 IN | TEMPERATURE: 97.9 F | HEART RATE: 78 BPM | SYSTOLIC BLOOD PRESSURE: 147 MMHG | WEIGHT: 246.6 LBS | OXYGEN SATURATION: 97 % | BODY MASS INDEX: 42.1 KG/M2

## 2025-01-23 DIAGNOSIS — E66.813 CLASS 3 SEVERE OBESITY DUE TO EXCESS CALORIES WITHOUT SERIOUS COMORBIDITY WITH BODY MASS INDEX (BMI) OF 40.0 TO 44.9 IN ADULT: ICD-10-CM

## 2025-01-23 DIAGNOSIS — M25.562 ACUTE PAIN OF LEFT KNEE: Primary | ICD-10-CM

## 2025-01-23 DIAGNOSIS — I10 ESSENTIAL HYPERTENSION: ICD-10-CM

## 2025-01-23 DIAGNOSIS — K08.89 PAIN, DENTAL: ICD-10-CM

## 2025-01-23 DIAGNOSIS — E66.01 CLASS 3 SEVERE OBESITY DUE TO EXCESS CALORIES WITHOUT SERIOUS COMORBIDITY WITH BODY MASS INDEX (BMI) OF 40.0 TO 44.9 IN ADULT: ICD-10-CM

## 2025-01-23 PROCEDURE — 99214 OFFICE O/P EST MOD 30 MIN: CPT | Performed by: STUDENT IN AN ORGANIZED HEALTH CARE EDUCATION/TRAINING PROGRAM

## 2025-01-23 PROCEDURE — 3080F DIAST BP >= 90 MM HG: CPT | Performed by: STUDENT IN AN ORGANIZED HEALTH CARE EDUCATION/TRAINING PROGRAM

## 2025-01-23 PROCEDURE — 3077F SYST BP >= 140 MM HG: CPT | Performed by: STUDENT IN AN ORGANIZED HEALTH CARE EDUCATION/TRAINING PROGRAM

## 2025-01-23 RX ORDER — DULAGLUTIDE 0.75 MG/.5ML
1.5 INJECTION, SOLUTION SUBCUTANEOUS WEEKLY
Qty: 2 ML | Refills: 2 | Status: SHIPPED | OUTPATIENT
Start: 2025-01-23

## 2025-01-23 RX ORDER — AMLODIPINE BESYLATE 5 MG/1
5 TABLET ORAL DAILY
Qty: 30 TABLET | Refills: 3 | Status: SHIPPED | OUTPATIENT
Start: 2025-01-23

## 2025-01-23 SDOH — ECONOMIC STABILITY: FOOD INSECURITY: WITHIN THE PAST 12 MONTHS, YOU WORRIED THAT YOUR FOOD WOULD RUN OUT BEFORE YOU GOT MONEY TO BUY MORE.: NEVER TRUE

## 2025-01-23 SDOH — ECONOMIC STABILITY: FOOD INSECURITY: WITHIN THE PAST 12 MONTHS, THE FOOD YOU BOUGHT JUST DIDN'T LAST AND YOU DIDN'T HAVE MONEY TO GET MORE.: SOMETIMES TRUE

## 2025-01-23 ASSESSMENT — ENCOUNTER SYMPTOMS
COLOR CHANGE: 0
COUGH: 0
ABDOMINAL PAIN: 0
WHEEZING: 0
SHORTNESS OF BREATH: 0
PHOTOPHOBIA: 0

## 2025-01-23 ASSESSMENT — PATIENT HEALTH QUESTIONNAIRE - PHQ9
SUM OF ALL RESPONSES TO PHQ QUESTIONS 1-9: 0
2. FEELING DOWN, DEPRESSED OR HOPELESS: NOT AT ALL
SUM OF ALL RESPONSES TO PHQ QUESTIONS 1-9: 0
SUM OF ALL RESPONSES TO PHQ QUESTIONS 1-9: 0
1. LITTLE INTEREST OR PLEASURE IN DOING THINGS: NOT AT ALL
SUM OF ALL RESPONSES TO PHQ9 QUESTIONS 1 & 2: 0
SUM OF ALL RESPONSES TO PHQ QUESTIONS 1-9: 0

## 2025-01-23 NOTE — PROGRESS NOTES
DIABETES and HYPERTENSION visit    BP Readings from Last 3 Encounters:   01/20/25 (!) 148/94   12/09/24 (!) 158/94   11/25/24 (!) 158/91        Hemoglobin A1C (%)   Date Value   02/09/2023 5.6   06/14/2020 5.6     HDL (mg/dL)   Date Value   07/11/2024 37 (L)     BUN (mg/dL)   Date Value   02/09/2023 8     Creatinine (mg/dL)   Date Value   02/09/2023 0.59     POC Creatinine (mg/dL)   Date Value   11/07/2023 0.4 (L)     Glucose (mg/dL)   Date Value   02/09/2023 96            Have you changed or started any medications since your last visit including any over-the-counter medicines, vitamins, or herbal medicines? no   Have you stopped taking any of your medications? Is so, why? -  no  Are you having any side effects from any of your medications? - no    Have you seen any other physician or provider since your last visit?  yes - Podiatry    Have you had any other diagnostic tests since your last visit?  yes - xray- right - foot , xray - right  - knee , xray right - ankle , MRI- right foot   Have you been seen in the emergency room and/or had an admission in a hospital since we last saw you?  yes - [facility]    Have you had your routine dental cleaning in the past 6 months?  no     Have you had your annual diabetic retinal (eye) exam? No   (ensure copy of exam is in the chart)    Do you have an active MyChart account? If no, what is the barrier?  Yes    Patient Care Team:  Evangelina Manzo MD as PCP - General (Family Medicine)  Evangelina Manzo MD as PCP - Empaneled Provider  Uriah Pierce MD as Surgeon (Cardiology)  Charity Cordova MD as Consulting Physician (Cardiology)    Medical History Review  Past Medical, Family, and Social History reviewed and does not contribute to the patient presenting condition    Health Maintenance   Topic Date Due    Breast cancer screen  10/22/2022    Hepatitis B vaccine (2 of 3 - 19+ 3-dose series) 12/14/2023    Colorectal Cancer Screen  Never done    Flu vaccine (1) 08/01/2024    COVID-19 
 Positive for arthralgias.   Skin:  Negative for color change.   Neurological:  Negative for dizziness and weakness.   Psychiatric/Behavioral:  Negative for agitation.                  The patient has a   Family History   Problem Relation Age of Onset    Liver Disease Mother     Cancer Father         bowel    Sickle Cell Anemia Sister     Asthma Son        Objective:    BP (!) 147/90 (Site: Left Upper Arm, Position: Sitting, Cuff Size: Large Adult)   Pulse 78   Temp 97.9 °F (36.6 °C)   Ht 1.626 m (5' 4\")   Wt 111.9 kg (246 lb 9.6 oz)   LMP 01/22/2025   SpO2 97%   BMI 42.33 kg/m²    BP Readings from Last 3 Encounters:   01/23/25 (!) 147/90   01/20/25 (!) 148/94   12/09/24 (!) 158/94       Physical Exam  Constitutional:       General: She is not in acute distress.     Appearance: She is obese. She is not ill-appearing, toxic-appearing or diaphoretic.   Cardiovascular:      Rate and Rhythm: Normal rate and regular rhythm.      Pulses: Normal pulses.      Heart sounds: No murmur heard.  Pulmonary:      Effort: Pulmonary effort is normal.      Breath sounds: No wheezing.   Musculoskeletal:         General: Swelling and tenderness present.      Right knee: No swelling, deformity, effusion, erythema or crepitus. Normal range of motion. No tenderness.      Left knee: Swelling present. No deformity, effusion, erythema or crepitus. Decreased range of motion. Tenderness present.   Skin:     General: Skin is warm.      Findings: No erythema.   Neurological:      Mental Status: She is alert and oriented to person, place, and time.         Lab Results   Component Value Date    WBC 5.7 02/09/2023    HGB 11.6 (L) 02/09/2023    HCT 37.5 02/09/2023     02/09/2023    CHOL 186 07/11/2024    TRIG 94 07/11/2024    HDL 37 (L) 07/11/2024    ALT 13 02/09/2023    AST 16 02/09/2023     (L) 02/09/2023    K 3.9 02/09/2023     02/09/2023    CREATININE 0.4 (L) 11/07/2023    BUN 8 02/09/2023    CO2 20 02/09/2023    TSH

## 2025-01-23 NOTE — PATIENT INSTRUCTIONS
[facility] Food Resources*  (Call [facility] for more resources)    [facility] [employer] [facility]  What they offer: Food pantry second and fourth Tuesday 4:30-6pm  Phone Number and Address: [phone] Toll Free: [employer] at [employer] (acquired by [employer]), between [employer] and [employer]; [address] [address]  [facility] Office on Aging of [facility]  What they offer: “Meals & Nutrition” search option on website  Phone Number and Website: 526.447.3333; https://[employer]/  [facility] Minist[facility]  What they offer: Dinner is open to all (must be registered and in good standing) and three hot meals a day for shelter residents. Breakfast 7-8am, Lunch 12-1pm, and Dinner 5-6pm daily.  Phone and Address: [phone]; [address] [address]  [employer] Delivery program  What they offer: Food Box Delivery for those within [facility] who are homebound or without transportation. Applications done by phone. Qualifying individuals are eligible for 3 deliveries for the lifetime of the program.  Phone number: Call for eligibility check at 2-1-1 or 9-928-846TeacherTube (6886)  [facility]  What they offer: Food Pantry second and fourth Saturday 1-5pm  Phone and Address: [phone]; [address] [address]  [employer] Food Pantries   What they offer: Mobile pantries throughout the [facility]. Anyone in need may visit one pantry per month.  Phone Number and Website: For locations and schedule call 2-1-1 or 8-950-167-HELP (6780) or check the website www.feedtoledo.org  [employer]  What they offer: Food Pantry, call for schedule, open to All  Phone Number: 550.637.8916  [facility]ping [facility]  What they offer: Hot meals, Breakfast: Monday, Wednesday, Friday 8-10am, Lunch: Monday-Friday 10am-12:30pm. Food pantry (serves [address] or east of [facility]) Tuesday

## 2025-01-29 ENCOUNTER — HOSPITAL ENCOUNTER (OUTPATIENT)
Dept: GENERAL RADIOLOGY | Age: 45
Discharge: HOME | End: 2025-01-31
Payer: COMMERCIAL

## 2025-01-29 ENCOUNTER — OFFICE VISIT (OUTPATIENT)
Dept: ORTHOPEDIC SURGERY | Age: 45
End: 2025-01-29

## 2025-01-29 ENCOUNTER — HOSPITAL ENCOUNTER (OUTPATIENT)
Age: 45
Discharge: HOME | End: 2025-01-31
Payer: COMMERCIAL

## 2025-01-29 VITALS — WEIGHT: 246 LBS | HEIGHT: 64 IN | BODY MASS INDEX: 42 KG/M2

## 2025-01-29 DIAGNOSIS — S83.207A ACUTE MENISCAL TEAR OF LEFT KNEE, INITIAL ENCOUNTER: Primary | ICD-10-CM

## 2025-01-29 DIAGNOSIS — M25.562 ACUTE PAIN OF LEFT KNEE: ICD-10-CM

## 2025-01-29 DIAGNOSIS — M25.562 LEFT KNEE PAIN, UNSPECIFIED CHRONICITY: ICD-10-CM

## 2025-01-29 PROCEDURE — 73560 X-RAY EXAM OF KNEE 1 OR 2: CPT

## 2025-01-29 RX ORDER — BUPIVACAINE HYDROCHLORIDE 2.5 MG/ML
2 INJECTION, SOLUTION INFILTRATION; PERINEURAL ONCE
Status: COMPLETED | OUTPATIENT
Start: 2025-01-29 | End: 2025-01-29

## 2025-01-29 RX ORDER — METHYLPREDNISOLONE ACETATE 80 MG/ML
80 INJECTION, SUSPENSION INTRA-ARTICULAR; INTRALESIONAL; INTRAMUSCULAR; SOFT TISSUE ONCE
Status: COMPLETED | OUTPATIENT
Start: 2025-01-29 | End: 2025-01-29

## 2025-01-29 RX ADMIN — BUPIVACAINE HYDROCHLORIDE 2 ML: 2.5 INJECTION, SOLUTION INFILTRATION; PERINEURAL at 09:58

## 2025-01-29 RX ADMIN — METHYLPREDNISOLONE ACETATE 80 MG: 80 INJECTION, SUSPENSION INTRA-ARTICULAR; INTRALESIONAL; INTRAMUSCULAR; SOFT TISSUE at 09:58

## 2025-01-29 NOTE — PROGRESS NOTES
[facility]  [address]  [place] [address]  Dept: [phone]    Ambulatory Orthopedic Office Visit      CHIEF COMPLAINT:    Chief Complaint   Patient presents with    Knee Pain     L knee pain and popping.        HISTORY OF PRESENT ILLNESS:      The patient is a 44 y.o.female who is being seen at the request of  Evangelina Manzo MD for consultation and evaluation of left knee pain.  Patient had a previous right Achilles injury in November.  She was utilizing crutches to ambulate in November when her left crutch slipped out from underneath her causing her to fall onto her left leg.  She felt an immediate pop and had significant pain.  Since that time she has had popping clicking catching of her left knee.  She has tried oral anti-inflammatories without significant improvement.  The pain is constant and causes her significant distress.  There are times that she feels that her knee is unstable and may give out.  She denies numbness or tingling in her distal extremity.        REVIEW OF SYSTEMS:    Constitutional: Negative for fever and chills.   HENT: Negative for congestion or drainage   Eyes: Negative for blurred vision and double vision.   Respiratory: Negative for cough, shortnessof breath and wheezing.   Cardiovascular: Negative for chest pain and palpitations.   Gastrointestinal: Negative for nausea. Negative for vomiting.   Musculoskeletal: Positive for myalgias and joint pain.   Skin:Negative for itching and rash.   Neurological: Negative for dizziness, sensory change and headaches.   Psychiatric/Behavioral: Negative for depression and suicidal ideas.     PHYSICAL EXAM:  Ht 1.626 m (5' 4\")   Wt 111.6 kg (246 lb)   LMP 01/22/2025   BMI 42.23 kg/m²   Physical Exam  Gen: alert and oriented  Psych:  Appropriate affect; Appropriate knowledge base; Appropriate mood; No hallucinations;   Head: normocephalic atraumatic

## 2025-01-30 ENCOUNTER — HOSPITAL ENCOUNTER (EMERGENCY)
Age: 45
Discharge: HOME | End: 2025-01-30
Attending: EMERGENCY MEDICINE
Payer: COMMERCIAL

## 2025-01-30 VITALS
SYSTOLIC BLOOD PRESSURE: 174 MMHG | HEART RATE: 77 BPM | BODY MASS INDEX: 40.17 KG/M2 | RESPIRATION RATE: 16 BRPM | DIASTOLIC BLOOD PRESSURE: 90 MMHG | OXYGEN SATURATION: 97 % | WEIGHT: 234 LBS | TEMPERATURE: 98 F

## 2025-01-30 DIAGNOSIS — A08.4 VIRAL GASTROENTERITIS: Primary | ICD-10-CM

## 2025-01-30 LAB
ALBUMIN SERPL-MCNC: 4.4 G/DL (ref 3.5–5.2)
ALBUMIN/GLOB SERPL: 1.2 {RATIO} (ref 1–2.5)
ALP SERPL-CCNC: 88 U/L (ref 35–104)
ALT SERPL-CCNC: 19 U/L (ref 10–35)
ANION GAP SERPL CALCULATED.3IONS-SCNC: 12 MMOL/L (ref 9–16)
AST SERPL-CCNC: 20 U/L (ref 10–35)
BASOPHILS # BLD: 0.03 K/UL (ref 0–0.2)
BASOPHILS NFR BLD: 0 % (ref 0–2)
BILIRUB SERPL-MCNC: 0.3 MG/DL (ref 0–1.2)
BUN SERPL-MCNC: 10 MG/DL (ref 6–20)
CALCIUM SERPL-MCNC: 9.8 MG/DL (ref 8.6–10.4)
CHLORIDE SERPL-SCNC: 103 MMOL/L (ref 98–107)
CO2 SERPL-SCNC: 24 MMOL/L (ref 20–31)
CREAT SERPL-MCNC: 0.7 MG/DL (ref 0.6–0.9)
EOSINOPHIL # BLD: <0.03 K/UL (ref 0–0.44)
EOSINOPHILS RELATIVE PERCENT: 0 % (ref 1–4)
ERYTHROCYTE [DISTWIDTH] IN BLOOD BY AUTOMATED COUNT: 14.1 % (ref 11.8–14.4)
FLUAV AG SPEC QL: NEGATIVE
FLUBV AG SPEC QL: NEGATIVE
GFR, ESTIMATED: >90 ML/MIN/1.73M2
GLUCOSE SERPL-MCNC: 111 MG/DL (ref 74–99)
HCG SERPL QL: NEGATIVE
HCT VFR BLD AUTO: 39.5 % (ref 36.3–47.1)
HGB BLD-MCNC: 12.9 G/DL (ref 11.9–15.1)
IMM GRANULOCYTES # BLD AUTO: 0.1 K/UL (ref 0–0.3)
IMM GRANULOCYTES NFR BLD: 1 %
LIPASE SERPL-CCNC: 15 U/L (ref 13–60)
LYMPHOCYTES NFR BLD: 1.43 K/UL (ref 1.1–3.7)
LYMPHOCYTES RELATIVE PERCENT: 8 % (ref 24–43)
MCH RBC QN AUTO: 28.2 PG (ref 25.2–33.5)
MCHC RBC AUTO-ENTMCNC: 32.7 G/DL (ref 28.4–34.8)
MCV RBC AUTO: 86.2 FL (ref 82.6–102.9)
MONOCYTES NFR BLD: 0.81 K/UL (ref 0.1–1.2)
MONOCYTES NFR BLD: 4 % (ref 3–12)
NEUTROPHILS NFR BLD: 87 % (ref 36–65)
NEUTS SEG NFR BLD: 16.46 K/UL (ref 1.5–8.1)
NRBC BLD-RTO: 0 PER 100 WBC
PLATELET # BLD AUTO: 288 K/UL (ref 138–453)
PMV BLD AUTO: 11.7 FL (ref 8.1–13.5)
POTASSIUM SERPL-SCNC: 3.9 MMOL/L (ref 3.7–5.3)
PROT SERPL-MCNC: 8.1 G/DL (ref 6.6–8.7)
RBC # BLD AUTO: 4.58 M/UL (ref 3.95–5.11)
SARS-COV-2 RDRP RESP QL NAA+PROBE: NOT DETECTED
SODIUM SERPL-SCNC: 139 MMOL/L (ref 136–145)
SPECIMEN DESCRIPTION: NORMAL
WBC OTHER # BLD: 18.8 K/UL (ref 3.5–11.3)

## 2025-01-30 PROCEDURE — 83690 ASSAY OF LIPASE: CPT

## 2025-01-30 PROCEDURE — 2580000003 HC RX 258

## 2025-01-30 PROCEDURE — 99284 EMERGENCY DEPT VISIT MOD MDM: CPT

## 2025-01-30 PROCEDURE — 87804 INFLUENZA ASSAY W/OPTIC: CPT

## 2025-01-30 PROCEDURE — 2500000003 HC RX 250 WO HCPCS

## 2025-01-30 PROCEDURE — 84703 CHORIONIC GONADOTROPIN ASSAY: CPT

## 2025-01-30 PROCEDURE — 87635 SARS-COV-2 COVID-19 AMP PRB: CPT

## 2025-01-30 PROCEDURE — 96374 THER/PROPH/DIAG INJ IV PUSH: CPT

## 2025-01-30 PROCEDURE — 85025 COMPLETE CBC W/AUTO DIFF WBC: CPT

## 2025-01-30 PROCEDURE — 6360000002 HC RX W HCPCS

## 2025-01-30 PROCEDURE — 80053 COMPREHEN METABOLIC PANEL: CPT

## 2025-01-30 RX ORDER — ONDANSETRON 2 MG/ML
4 INJECTION INTRAMUSCULAR; INTRAVENOUS ONCE
Status: COMPLETED | OUTPATIENT
Start: 2025-01-30 | End: 2025-01-30

## 2025-01-30 RX ORDER — ONDANSETRON 4 MG/1
4 TABLET, ORALLY DISINTEGRATING ORAL 3 TIMES DAILY PRN
Qty: 21 TABLET | Refills: 0 | Status: SHIPPED | OUTPATIENT
Start: 2025-01-30

## 2025-01-30 RX ORDER — KETOROLAC TROMETHAMINE 30 MG/ML
30 INJECTION, SOLUTION INTRAMUSCULAR; INTRAVENOUS ONCE
Status: COMPLETED | OUTPATIENT
Start: 2025-01-30 | End: 2025-01-30

## 2025-01-30 RX ADMIN — KETOROLAC TROMETHAMINE 30 MG: 30 INJECTION, SOLUTION INTRAMUSCULAR; INTRAVENOUS at 22:09

## 2025-01-30 RX ADMIN — FAMOTIDINE 20 MG: 10 INJECTION, SOLUTION INTRAVENOUS at 22:09

## 2025-01-30 RX ADMIN — ONDANSETRON 4 MG: 2 INJECTION INTRAMUSCULAR; INTRAVENOUS at 22:09

## 2025-01-30 ASSESSMENT — PAIN - FUNCTIONAL ASSESSMENT: PAIN_FUNCTIONAL_ASSESSMENT: NONE - DENIES PAIN

## 2025-01-31 NOTE — ED PROVIDER NOTES
[facility] EMERGENCY DEPARTMENT     Emergency Department     Faculty Attestation    I performed a history and physical examination of the patient and discussed management with the resident. I reviewed the resident’s note and agree with the documented findings and plan of care. Any areas of disagreement are noted on the chart. I was personally present for the key portions of any procedures. I have documented in the chart those procedures where I was not present during the key portions. I have reviewed the emergency nurses triage note. I agree with the chief complaint, past medical history, past surgical history, allergies, medications, social and family history as documented unless otherwise noted below. For Physician Assistant/ Nurse Practitioner cases/documentation I have personally evaluated this patient and have completed at least one if not all key elements of the E/M (history, physical exam, and MDM). Additional findings are as noted.    10:10 PM EST    Patient presents with abdominal pain, nausea and vomiting that she says started earlier today.  She denies fever, cough, congestion, chest pain or shortness of breath.  She denies dysuria.  She denies diarrhea or changes in bowel movements.  On exam, patient is resting comfortably in the bed.  Lungs clear to auscultation bilaterally and heart sounds are normal.  Abdomen is soft with mild epigastric tenderness.  No rebound or guarding is present.  Will check labs and treat patient's pain and nausea and reassess.      Domitila Neal MD  Attending Emergency  Physician

## 2025-01-31 NOTE — DISCHARGE INSTRUCTIONS
You have viral gastroenteritis, this is also known as the common stomach bug.  You had an increase in your white blood cell count, this happens when you have an illness.  The rest of your labs were normal.  You were given an antinausea medication in the emergency department and have been prescribed this medication.  Please use as needed.       Flu and COVID were negative.  Pregnancy test negative.    Return to the emergency department immediately if you have worsening of your symptoms including persistent nausea/vomiting despite taking antinausea medication, persistent fever despite take ibuprofen or Tylenol, severe abdominal pain, blood in stool or for any other worrisome concerns.

## 2025-01-31 NOTE — ED TRIAGE NOTES
Patient presents to the ED via Triage. Patient states that she has been sick all day with nausea, vomiting and loose stools. Patient was placed on bedside monitor, and IV established. Patient is in NAD, respirations are even and unlabored, bed in lowest position and call light with in reach. Resident and Attending bedside for evaluation. Writer will continue with plan of care.

## 2025-01-31 NOTE — ED PROVIDER NOTES
[facility] EMERGENCY DEPARTMENT  Emergency Department Encounter  Emergency Medicine Resident     Pt Name:Shelia Carver  MRN: [id number]  Birthdate 1980  Date of evaluation: 25  PCP:  Evangelina Manzo MD  Note Started: 10:04 PM EST      CHIEF COMPLAINT       Chief Complaint   Patient presents with    Nausea    Vomiting       HISTORY OF PRESENT ILLNESS  (Location/Symptom, Timing/Onset, Context/Setting, Quality, Duration, Modifying Factors, Severity.)      Shelia Carver is a 44 y.o. female who presents with 1 day history of epigastric abdominal discomfort, nonbloody diarrhea, persistent nausea and vomiting.  Patient denies chest pain, shortness of breath, diaphoresis or palpitations.  She states she recently had a left cortisone knee injection.  She has not been able to tolerate oral intake secondary to nausea vomiting.  Her abdominal pain is described as uncomfortable sensation, primarily localized to epigastric region with some radiation to the left upper quadrant.  There are no aggravating or relieving factors.  She denies pelvic pain, vaginal bleeding/discharge or urinary symptoms.    PAST MEDICAL / SURGICAL / SOCIAL / FAMILY HISTORY      has a past medical history of Anxiety, CAD (coronary artery disease), Claustrophobia, COVID-19, COVID-19 vaccination not done, Gestational diabetes, Hypertension, Latex allergy, Shoulder pain, left, Under care of team, and Wears glasses.       has a past surgical history that includes  section; Carpal tunnel release; Achilles tendon surgery; Cardiac surgery (2020); and mri upper extremity w or wo contrast (2023).      Social History     Socioeconomic History    Marital status: Single     Spouse name: Not on file    Number of children: Not on file    Years of education: Not on file    Highest education level: Not on file   Occupational History    Not on file   Tobacco Use    Smoking status: Former     Current packs/day: 0.00     Types:

## 2025-03-21 ENCOUNTER — TELEPHONE (OUTPATIENT)
Dept: FAMILY MEDICINE CLINIC | Age: 45
End: 2025-03-21

## 2025-03-26 ENCOUNTER — OFFICE VISIT (OUTPATIENT)
Dept: ORTHOPEDIC SURGERY | Age: 45
End: 2025-03-26
Payer: COMMERCIAL

## 2025-03-26 VITALS — BODY MASS INDEX: 39.95 KG/M2 | HEIGHT: 64 IN | WEIGHT: 234 LBS

## 2025-03-26 DIAGNOSIS — S83.207A ACUTE MENISCAL TEAR OF LEFT KNEE, INITIAL ENCOUNTER: ICD-10-CM

## 2025-03-26 DIAGNOSIS — M25.562 LEFT KNEE PAIN, UNSPECIFIED CHRONICITY: Primary | ICD-10-CM

## 2025-03-26 PROCEDURE — 99213 OFFICE O/P EST LOW 20 MIN: CPT | Performed by: STUDENT IN AN ORGANIZED HEALTH CARE EDUCATION/TRAINING PROGRAM

## 2025-03-26 NOTE — PROGRESS NOTES
[facility]  [address]  [place] [address]  Dept: [phone]  Dept Fax: [phone]        Ambulatory   Follow Up    Subjective:   Shelia Carver is a 44 year old female who presents to our office today for evaluation of their left knee pain.  Patient was most recently seen in our office on 1/29/2025 due to acute left knee pain after falling while using crutches.  Patient unfortunately was not able to get the MRI due to losing the order.  Patient also states that she was supposed to start physical therapy but did not start it.  Patient states that her pain has continued she complains of medial and lateral joint line pain.  She also complains of popping clicking and locking up on her.  She has not had any falls since her initial injury.  Patient also complains of left cubital and carpal tunnel symptoms but at this time we will have her follow-up at a later date regarding these issue.    Review of Systems   Constitutional: Negative for Fever and Chills.   HENT: Negative for Congestion.    Eyes: Negative for Blurred Vision and Double Vision.   Respiratory: Negative for Cough, Shortness of Breath and Wheezing.    Cardiovascular: Negative for Chest Pain and Palpitations.   Gastrointestinal: Negative for Nausea. Negative for Vomiting.   Musculoskeletal: Positive for Myalgias and Joint Pain (left knee pain).   Skin: Negative for Itching and Rash.   Neurological: Negative for Dizziness, Sensory Change and Headaches.   Psychiatric/Behavioral: Negative for Depression and Suicidal Ideas.     Objective:   General: AAOx3, NAD.  Ortho Exam  Neuro: Alert. Oriented.  Eyes: Extra-Ocular Muscles Intact.  Mouth: Oral Mucosa Moist. No Perioral Lesions.  Pulm: Respirations Unlabored and Regular.  Skin: Warm, Well Perfused.  Psych: Patient has good fund of knowledge and displays understanding of Exam, Diagnosis, and Plan.  MSK:   LLE: Skin intact.

## 2025-04-02 ENCOUNTER — HOSPITAL ENCOUNTER (OUTPATIENT)
Dept: MRI IMAGING | Age: 45
Discharge: HOME | End: 2025-04-04
Payer: COMMERCIAL

## 2025-04-02 DIAGNOSIS — S83.207A ACUTE MENISCAL TEAR OF LEFT KNEE, INITIAL ENCOUNTER: ICD-10-CM

## 2025-04-02 PROCEDURE — 73721 MRI JNT OF LWR EXTRE W/O DYE: CPT

## 2025-04-23 ENCOUNTER — OFFICE VISIT (OUTPATIENT)
Dept: ORTHOPEDIC SURGERY | Age: 45
End: 2025-04-23
Payer: COMMERCIAL

## 2025-04-23 VITALS — BODY MASS INDEX: 39.95 KG/M2 | WEIGHT: 234 LBS | HEIGHT: 64 IN

## 2025-04-23 DIAGNOSIS — M22.2X2 PATELLOFEMORAL SYNDROME OF LEFT KNEE: Primary | ICD-10-CM

## 2025-04-23 PROCEDURE — 99213 OFFICE O/P EST LOW 20 MIN: CPT | Performed by: STUDENT IN AN ORGANIZED HEALTH CARE EDUCATION/TRAINING PROGRAM

## 2025-04-23 NOTE — PROGRESS NOTES
[facility]  [address]  [place] [address]  Dept: [phone]  Dept Fax: [phone]        Orthopaedic Clinic Follow Up      Subjective:   Shelia Carver is a 44 y.o. female who presents to the clinic today for routine followup regarding her left knee pain. Patient originally sustained injury in January of this year where she fell while using crutches. She had originally been recommended for obtaining an MRI and starting in physical therapy. She did get her MRI but has not yet started in therapy. She states that since the injury she feels like there are times where her knee is unstable and has pain especially with going up the stairs. She states she has a level of constant ache in the knee laterally which can radiate up the lateral aspect of the leg into the hip. She denies any prior hip pain and states that she feels like her knee has lead to awkward gait mechanics which subsequently has lead to some hip pain. She notes some intermittent popping and catching of her left knee which is new since she had the fall in January. She has tried ibuprofen for pain control which does help to some extent. She denies any numbness/tingling distally. No new injuries since the fall in January. No other complaints at this time. She is here today for MRI results.     ROS:  Gen: No fevers/chills   Cardio: no chest pain   Lungs: no shortness of breath   MSK: pain in left knee   Neuro: no numbness, tingling, weakness     Objective :   Physical exam  Gen: AAOx3, no acute distress  Cardio: regular rate  Lungs: symmetrical chest expansion, no audible wheezing   MSK: Left knee: No ecchymoses, abrasions, deformity, or lacerations. Skin intact. Mild tenderness to palpation along lateral joint line as well as along lateral aspect of patella. ROM from 0-100 degrees. Equivocal Tray evaluation. No instability with varus/valgus stress testing at 0 and 30

## 2025-04-30 ENCOUNTER — HOSPITAL ENCOUNTER (OUTPATIENT)
Age: 45
Setting detail: THERAPIES SERIES
Discharge: HOME | End: 2025-04-30

## 2025-04-30 NOTE — FLOWSHEET NOTE
[x] [facility]  Outpatient Rehabilitation &  Therapy  [address]  [phone]  [phone]              Therapy Cancel/No Show note    Date: 2025  Patient: Shelia Carver  : 1980  MRN: [id number]    Cancels/No Shows to date:     For today's appointment patient:    [x]  Cancelled    [x] Rescheduled appointment    [] No-show     Reason given by patient:    []  Patient ill    []  Conflicting appointment    [] No transportation      [] Conflict with work    [] No reason given    [] Weather related    [] COVID-19    [x] Other:      Comments:   reports child is sick        [x] Next appointment was confirmed    Electronically signed by: Tara Pop PT

## 2025-05-05 ENCOUNTER — HOSPITAL ENCOUNTER (OUTPATIENT)
Age: 45
Setting detail: THERAPIES SERIES
Discharge: HOME | End: 2025-05-05

## 2025-05-05 NOTE — FLOWSHEET NOTE
[x] [facility]  Outpatient Rehabilitation &  Therapy  [address]  [phone]  [phone]              Therapy Cancel/No Show note    Date: 2025  Patient: Shelia Carver  : 1980  MRN: [id number]    Cancels/No Shows to date:     For today's appointment patient:    [x]  Cancelled    [x] Rescheduled appointment    [] No-show     Reason given by patient:    []  Patient ill    []  Conflicting appointment    [] No transportation      [] Conflict with work    [] No reason given    [] Weather related    [] COVID-19    [x] Other:      Comments: arrived for eval but does not currently have insurance.  Opted to cancel PT evaluation this date and will call her insurance company to figure out what is going on.        [x] Next appointment was confirmed 2025    Electronically signed by: Tracie Reardon PT

## 2025-05-07 ENCOUNTER — TELEPHONE (OUTPATIENT)
Age: 45
End: 2025-05-07

## 2025-05-12 ENCOUNTER — HOSPITAL ENCOUNTER (OUTPATIENT)
Age: 45
Setting detail: THERAPIES SERIES
Discharge: HOME | End: 2025-05-12

## 2025-05-12 NOTE — FLOWSHEET NOTE
[x] [facility]  Outpatient Rehabilitation &  Therapy  [address]  [phone]  [phone] [] [facility]  Outpatient Rehabilitation &  Therapy  [address]  P: [phone]  F: [phone] [] [facility]  Outpatient Rehabilitation &  Therapy  [address]  P: [phone]  F: [phone] [] [facility]  Outpatient Rehabilitation &  Therapy  [address]  [phone]  [phone] [] [facility]  Outpatient Rehabilitation &  Therapy  [address]   P: [phone]  F: [phone]  [] [facility]  Outpatient Rehabilitation &  Therapy  [address]  P: [phone]  F: [phone] [] [facility]  Outpatient Rehabilitation &  Therapy  [address]  P: [phone]  F: [phone] [] [facility]  Outpatient Rehabilitation &  Therapy  [address]  P: [phone]  F: [phone] [] [facility]  Outpatient Rehabilitation &  Therapy  [address]  P: [phone]  F: [phone]  [] [facility] Outpatient Rehabilitation &  Therapy  [address]  P: [phone]  F: [phone]     Therapy Cancel/No Show note    Date: 2025  Patient: Shelia Carver  : 1980  MRN: [id number]    Cancels/No Shows to date: 3/1    For today's appointment patient:    [x]  Cancelled    [] Rescheduled appointment    [] No-show     Reason given by patient:    []  Patient ill    []  Conflicting appointment    [] No transportation      [] Conflict with work    [] No reason given    [] Weather related    [] COVID-19    [] Other:      Comments:  Pt does not have currently active medical insurance.  Will not reschedule until she does.        [] Next appointment was confirmed    Electronically signed by: Itzel Vallecillo PT

## 2025-06-09 ENCOUNTER — HOSPITAL ENCOUNTER (EMERGENCY)
Age: 45
Discharge: HOME | End: 2025-06-09
Attending: STUDENT IN AN ORGANIZED HEALTH CARE EDUCATION/TRAINING PROGRAM
Payer: COMMERCIAL

## 2025-06-09 VITALS
TEMPERATURE: 99.1 F | SYSTOLIC BLOOD PRESSURE: 188 MMHG | DIASTOLIC BLOOD PRESSURE: 101 MMHG | HEART RATE: 67 BPM | RESPIRATION RATE: 15 BRPM | OXYGEN SATURATION: 100 %

## 2025-06-09 DIAGNOSIS — K02.9 DENTAL CARIES: Primary | ICD-10-CM

## 2025-06-09 LAB
ANION GAP SERPL CALCULATED.3IONS-SCNC: 12 MMOL/L (ref 9–16)
BASOPHILS # BLD: <0.03 K/UL (ref 0–0.2)
BASOPHILS NFR BLD: 0 % (ref 0–2)
BUN SERPL-MCNC: 6 MG/DL (ref 6–20)
CALCIUM SERPL-MCNC: 9.1 MG/DL (ref 8.6–10.4)
CHLORIDE SERPL-SCNC: 103 MMOL/L (ref 98–107)
CO2 SERPL-SCNC: 23 MMOL/L (ref 20–31)
CREAT SERPL-MCNC: 0.7 MG/DL (ref 0.6–0.9)
CRP SERPL HS-MCNC: 9.9 MG/L (ref 0–5)
EOSINOPHIL # BLD: 0.03 K/UL (ref 0–0.44)
EOSINOPHILS RELATIVE PERCENT: 0 % (ref 1–4)
ERYTHROCYTE [DISTWIDTH] IN BLOOD BY AUTOMATED COUNT: 13.5 % (ref 11.8–14.4)
ERYTHROCYTE [SEDIMENTATION RATE] IN BLOOD BY PHOTOMETRIC METHOD: 49 MM/HR (ref 0–20)
GFR, ESTIMATED: >90 ML/MIN/1.73M2
GLUCOSE SERPL-MCNC: 100 MG/DL (ref 74–99)
HCT VFR BLD AUTO: 37.1 % (ref 36.3–47.1)
HGB BLD-MCNC: 12.3 G/DL (ref 11.9–15.1)
IMM GRANULOCYTES # BLD AUTO: <0.03 K/UL (ref 0–0.3)
IMM GRANULOCYTES NFR BLD: 0 %
LYMPHOCYTES NFR BLD: 1.3 K/UL (ref 1.1–3.7)
LYMPHOCYTES RELATIVE PERCENT: 16 % (ref 24–43)
MCH RBC QN AUTO: 28.5 PG (ref 25.2–33.5)
MCHC RBC AUTO-ENTMCNC: 33.2 G/DL (ref 28.4–34.8)
MCV RBC AUTO: 86.1 FL (ref 82.6–102.9)
MONOCYTES NFR BLD: 0.43 K/UL (ref 0.1–1.2)
MONOCYTES NFR BLD: 5 % (ref 3–12)
NEUTROPHILS NFR BLD: 79 % (ref 36–65)
NEUTS SEG NFR BLD: 6.19 K/UL (ref 1.5–8.1)
NRBC BLD-RTO: 0 PER 100 WBC
PLATELET # BLD AUTO: 209 K/UL (ref 138–453)
PMV BLD AUTO: 11.3 FL (ref 8.1–13.5)
POTASSIUM SERPL-SCNC: 3.7 MMOL/L (ref 3.7–5.3)
RBC # BLD AUTO: 4.31 M/UL (ref 3.95–5.11)
SODIUM SERPL-SCNC: 138 MMOL/L (ref 136–145)
WBC OTHER # BLD: 8 K/UL (ref 3.5–11.3)

## 2025-06-09 PROCEDURE — 6360000002 HC RX W HCPCS

## 2025-06-09 PROCEDURE — 99283 EMERGENCY DEPT VISIT LOW MDM: CPT

## 2025-06-09 PROCEDURE — 6370000000 HC RX 637 (ALT 250 FOR IP)

## 2025-06-09 PROCEDURE — 86140 C-REACTIVE PROTEIN: CPT

## 2025-06-09 PROCEDURE — 85025 COMPLETE CBC W/AUTO DIFF WBC: CPT

## 2025-06-09 PROCEDURE — 80048 BASIC METABOLIC PNL TOTAL CA: CPT

## 2025-06-09 PROCEDURE — 96374 THER/PROPH/DIAG INJ IV PUSH: CPT

## 2025-06-09 PROCEDURE — 85652 RBC SED RATE AUTOMATED: CPT

## 2025-06-09 RX ORDER — METOPROLOL TARTRATE 50 MG
25 TABLET ORAL ONCE
Status: COMPLETED | OUTPATIENT
Start: 2025-06-09 | End: 2025-06-09

## 2025-06-09 RX ORDER — HYDROCODONE BITARTRATE AND ACETAMINOPHEN 5; 325 MG/1; MG/1
1 TABLET ORAL ONCE
Status: COMPLETED | OUTPATIENT
Start: 2025-06-09 | End: 2025-06-09

## 2025-06-09 RX ORDER — FUROSEMIDE 20 MG/1
20 TABLET ORAL ONCE
Status: COMPLETED | OUTPATIENT
Start: 2025-06-09 | End: 2025-06-09

## 2025-06-09 RX ORDER — AMLODIPINE BESYLATE 10 MG/1
5 TABLET ORAL ONCE
Status: COMPLETED | OUTPATIENT
Start: 2025-06-09 | End: 2025-06-09

## 2025-06-09 RX ORDER — MORPHINE SULFATE 4 MG/ML
2 INJECTION, SOLUTION INTRAMUSCULAR; INTRAVENOUS ONCE
Refills: 0 | Status: COMPLETED | OUTPATIENT
Start: 2025-06-09 | End: 2025-06-09

## 2025-06-09 RX ORDER — IBUPROFEN 400 MG/1
400 TABLET, FILM COATED ORAL EVERY 8 HOURS PRN
Qty: 15 TABLET | Refills: 0 | Status: SHIPPED | OUTPATIENT
Start: 2025-06-09 | End: 2025-06-14

## 2025-06-09 RX ORDER — KETOROLAC TROMETHAMINE 30 MG/ML
30 INJECTION, SOLUTION INTRAMUSCULAR; INTRAVENOUS ONCE
Status: DISCONTINUED | OUTPATIENT
Start: 2025-06-09 | End: 2025-06-09

## 2025-06-09 RX ADMIN — MORPHINE SULFATE 2 MG: 4 INJECTION, SOLUTION INTRAMUSCULAR; INTRAVENOUS at 16:58

## 2025-06-09 RX ADMIN — METOPROLOL TARTRATE 25 MG: 50 TABLET, FILM COATED ORAL at 14:33

## 2025-06-09 RX ADMIN — FUROSEMIDE 20 MG: 20 TABLET ORAL at 14:34

## 2025-06-09 RX ADMIN — HYDROCODONE BITARTRATE AND ACETAMINOPHEN 1 TABLET: 5; 325 TABLET ORAL at 14:34

## 2025-06-09 RX ADMIN — AMLODIPINE BESYLATE 5 MG: 10 TABLET ORAL at 14:37

## 2025-06-09 ASSESSMENT — PAIN SCALES - GENERAL
PAINLEVEL_OUTOF10: 10
PAINLEVEL_OUTOF10: 10

## 2025-06-09 ASSESSMENT — ENCOUNTER SYMPTOMS
RESPIRATORY NEGATIVE: 1
FACIAL SWELLING: 0

## 2025-06-09 ASSESSMENT — PAIN - FUNCTIONAL ASSESSMENT: PAIN_FUNCTIONAL_ASSESSMENT: 0-10

## 2025-06-09 ASSESSMENT — LIFESTYLE VARIABLES
HOW OFTEN DO YOU HAVE A DRINK CONTAINING ALCOHOL: NEVER
HOW MANY STANDARD DRINKS CONTAINING ALCOHOL DO YOU HAVE ON A TYPICAL DAY: PATIENT DOES NOT DRINK

## 2025-06-09 NOTE — DISCHARGE INSTRUCTIONS
You have been seen in the ER today for dental pain. A history and exam was taken and we believe that the cause of this is acute on chronic dental caries.    While you were here, we did the following:  CBC, BMP. ESR, CRP  Norco, Lopressor 25 mg, Norvasc 5 mg, Lasix 20 mg, Morphine  Upon discharge, you have received the following:  Referral for primary care doctor and dentist  Augmentin and Ibuprofen     If you begin to experience any symptoms such as chest pain shortness of breath nausea vomiting dizziness drowsiness abdominal pain loss of consciousness or any other symptoms you find concerning please return to the ED for follow-up evaluation.    If you have been given medication please take them as prescribed. Do not take more medication than recommended at any given time. Finish all antibiotic    Please follow-up with your primary care provider within 5 to 7 days for continued care.     Please feel free return to the hospital if your symptoms worsen or any new concerning symptoms develop.  Follow-up with your primary care physician as needed for all other the concerns.

## 2025-06-09 NOTE — ED PROVIDER NOTES
[facility]     Emergency Department     Faculty Attestation    I performed a history and physical examination of the patient and discussed management with the resident. I have reviewed and agree with the resident’s findings including all diagnostic interpretations, and treatment plans as written at the time of my review. Any areas of disagreement are noted on the chart. I was personally present for the key portions of any procedures. I have documented in the chart those procedures where I was not present during the key portions. For Physician Assistant/ Nurse Practitioner cases/documentation I have personally evaluated this patient and have completed at least one if not all key elements of the E/M (history, physical exam, and MDM). Additional findings are as noted.    PtName: Shelia Carver  MRN: [id number]  Birthdate 1980  Date of evaluation: 6/9/25  Note Started: 2:33 PM EDT    Primary Care Physician: Evangelina Manzo MD    Brief HPI: Dental pain, dental caries, right upper jaw      Pertinent Physical Exam Findings:  Vitals:    06/09/25 1352   BP: (!) 215/105   Pulse: 67   Resp: 18   Temp: 99.1 °F (37.3 °C)   SpO2: 100%        Physical exam findings:     Acute on chronic dental caries right upper jaw    Medical Decision Making: Patient is a 45 y.o. female presenting to the emergency department with dental pain. The chart was reviewed for pertinent history relating to the chief complaint.  The patient appears stable.     ED Course: starting abx and pain control x1 dose norco or its equivalent and referral as outpatient to a dentist    All results, including labs (if ordered), imaging (if ordered), and EKGs (if ordered) were independently interpreted by me.  See radiologist report for additional details on imaging studies.      (Please note that portions of this note were completed with a voice recognition program.  Efforts were made to edit the dictations but 
heart sounds. No murmur heard.  Pulmonary:      Effort: No respiratory distress.      Breath sounds: Normal breath sounds. No wheezing or rhonchi.   Abdominal:      General: Bowel sounds are normal. There is no distension.      Tenderness: There is no abdominal tenderness.      Hernia: No hernia is present.   Musculoskeletal:         General: No swelling.      Right lower leg: No edema.      Left lower leg: No edema.   Skin:     Coloration: Skin is not jaundiced.   Neurological:      Mental Status: She is alert and oriented to person, place, and time.           DDX/DIAGNOSTIC RESULTS / EMERGENCY DEPARTMENT COURSE / MDM     Medical Decision Making  45-year-old female presented to emergency department complaint of right upper molar pain associated with swelling of the right upper jaw.  She denies having any discharge or bleeding from her teeth.    Amount and/or Complexity of Data Reviewed  Labs: ordered. Decision-making details documented in ED Course.    Risk  Prescription drug management.        EKG      All EKG's are interpreted by the Emergency Department Physician who either signs or Co-signs this chart in the absence of a cardiologist.    EMERGENCY DEPARTMENT COURSE:      ED Course as of 06/09/25 1715   Mon Jun 09, 2025   1436 45-year-old female with past medical history significant for hypertension presented emergency department with complaint of right upper jaw pain and swelling.  Patient mentioned that she has been having pain in her right upper molar teeth for 1 day and has been taking ibuprofen and Motrin for the same.    On examination patient is initially hypertensive with systolic blood pressure of 215/105 mmHg.  Patient appears visibly anxious and in pain which could be contributing to high blood pressure.    On further examination patient does have acute on chronic dental caries and has swelling of right upper cheek.  Patient does not have any concern for Butch's angina or other deep space infection

## 2025-06-12 DIAGNOSIS — Z12.31 ENCOUNTER FOR SCREENING MAMMOGRAM FOR MALIGNANT NEOPLASM OF BREAST: Primary | ICD-10-CM

## 2025-06-12 DIAGNOSIS — Z87.42 HX OF OVARIAN CYST: Primary | ICD-10-CM

## 2025-06-25 ENCOUNTER — TELEPHONE (OUTPATIENT)
Age: 45
End: 2025-06-25

## 2025-06-25 DIAGNOSIS — I10 ESSENTIAL HYPERTENSION: ICD-10-CM

## 2025-06-25 NOTE — TELEPHONE ENCOUNTER
Received new patient referral. Called patient, advised that we currently have no new patient appointments available but she can be added to the new patient wait list and called as soon as appointments become available. Patient amenable. Patient added to wait list.

## 2025-06-25 NOTE — TELEPHONE ENCOUNTER
Last visit: 1.23.25  Last Med refill: 8.1.244  Does patient have enough medication for 72 hours: Yes    Next Visit Date:  Future Appointments   Date Time Provider Department Center   7/3/2025  3:30 PM Evangelina Manzo MD [facility] ECC DEP       Health Maintenance   Topic Date Due    Breast cancer screen  10/22/2022    Hepatitis B vaccine (2 of 3 - 19+ 3-dose series) 12/14/2023    Colorectal Cancer Screen  Never done    COVID-19 Vaccine (1 - 2024-25 season) Never done    Lipids  07/11/2025    Flu vaccine (Season Ended) 08/01/2025    Cervical cancer screen  09/22/2025    Depression Screen  01/23/2026    DTaP/Tdap/Td vaccine (2 - Td or Tdap) 02/16/2032    Hepatitis C screen  Completed    HIV screen  Completed    Hepatitis A vaccine  Aged Out    Hib vaccine  Aged Out    HPV vaccine  Aged Out    Polio vaccine  Aged Out    Meningococcal (ACWY) vaccine  Aged Out    Meningococcal B vaccine  Aged Out    Pneumococcal 0-49 years Vaccine  Aged Out    Diabetes screen  Discontinued       Hemoglobin A1C (%)   Date Value   02/09/2023 5.6   06/14/2020 5.6             ( goal A1C is < 7)   No components found for: \"LABMICR\"  No components found for: \"LDLCHOLESTEROL\", \"LDLCALC\"    (goal LDL is <100)   AST (U/L)   Date Value   01/30/2025 20     ALT (U/L)   Date Value   01/30/2025 19     BUN (mg/dL)   Date Value   06/09/2025 6     BP Readings from Last 3 Encounters:   06/09/25 (!) 188/101   01/30/25 (!) 174/90   01/23/25 (!) 147/90          (goal 120/80)    All Future Testing planned in [facility]  Lab Frequency Next Occurrence   US EXTREMITY JOINT LEFT NON VASC COMPLETE Once 07/30/2024   CHESTER DIGITAL SCREEN W OR WO CAD BILATERAL Once 06/12/2025               Patient Active Problem List:     Psychosis (HCC)     Hypokalemia     Iron deficiency anemia secondary to inadequate dietary iron intake     Paranoid delusion (HCC)     Essential hypertension     Chest pain     STEMI involving left anterior descending coronary artery (HCC)

## 2025-06-28 RX ORDER — FUROSEMIDE 20 MG/1
20 TABLET ORAL DAILY
Qty: 60 TABLET | Refills: 3 | Status: SHIPPED | OUTPATIENT
Start: 2025-06-28

## 2025-07-03 ENCOUNTER — TELEPHONE (OUTPATIENT)
Age: 45
End: 2025-07-03

## 2025-07-03 DIAGNOSIS — E66.813 CLASS 3 SEVERE OBESITY DUE TO EXCESS CALORIES WITHOUT SERIOUS COMORBIDITY WITH BODY MASS INDEX (BMI) OF 40.0 TO 44.9 IN ADULT (HCC): ICD-10-CM

## 2025-07-03 RX ORDER — DULAGLUTIDE 0.75 MG/.5ML
3 INJECTION, SOLUTION SUBCUTANEOUS WEEKLY
Qty: 9 ML | Refills: 1 | Status: SHIPPED | OUTPATIENT
Start: 2025-07-03

## 2025-07-03 NOTE — TELEPHONE ENCOUNTER
Patient unable to come into office today, someone  is at her home cleaning her carpets. Lena stated the appt was regarding Trulicity being increase. Writer spoke to Dr. Manzo, per provider could increase the medication to 3 mg once weekly. Medication will be sent to patient pharmacy. Patient to follow back up with provider in one month. Patient not able to access [employer] as not able to download anything else on her phone. Patient is scheduled on 7-.

## 2025-07-15 DIAGNOSIS — I10 ESSENTIAL HYPERTENSION: ICD-10-CM

## 2025-07-16 NOTE — TELEPHONE ENCOUNTER
Last visit: 01/23/25  Last Med refill: 01/23/25  Does patient have enough medication for 72 hours: No:     Next Visit Date:  Future Appointments   Date Time Provider Department Center   7/31/2025  3:00 PM Evangelina Manzo MD [facility] ECC DEP       Health Maintenance   Topic Date Due    Breast cancer screen  10/22/2022    Hepatitis B vaccine (2 of 3 - 19+ 3-dose series) 12/14/2023    Colorectal Cancer Screen  Never done    COVID-19 Vaccine (1 - 2024-25 season) Never done    Lipids  07/11/2025    Flu vaccine (1) 08/01/2025    Cervical cancer screen  09/22/2025    Depression Screen  01/23/2026    DTaP/Tdap/Td vaccine (2 - Td or Tdap) 02/16/2032    Hepatitis C screen  Completed    HIV screen  Completed    Hepatitis A vaccine  Aged Out    Hib vaccine  Aged Out    HPV vaccine  Aged Out    Polio vaccine  Aged Out    Meningococcal (ACWY) vaccine  Aged Out    Meningococcal B vaccine  Aged Out    Pneumococcal 0-49 years Vaccine  Aged Out    Diabetes screen  Discontinued       Hemoglobin A1C (%)   Date Value   02/09/2023 5.6   06/14/2020 5.6             ( goal A1C is < 7)   No components found for: \"LABMICR\"  No components found for: \"LDLCHOLESTEROL\", \"LDLCALC\"    (goal LDL is <100)   AST (U/L)   Date Value   01/30/2025 20     ALT (U/L)   Date Value   01/30/2025 19     BUN (mg/dL)   Date Value   06/09/2025 6     BP Readings from Last 3 Encounters:   06/09/25 (!) 188/101   01/30/25 (!) 174/90   01/23/25 (!) 147/90          (goal 120/80)    All Future Testing planned in [facility]  Lab Frequency Next Occurrence   US EXTREMITY JOINT LEFT NON VASC COMPLETE Once 07/30/2024   CHESTER DIGITAL SCREEN W OR WO CAD BILATERAL Once 06/12/2025               Patient Active Problem List:     Psychosis (HCC)     Hypokalemia     Iron deficiency anemia secondary to inadequate dietary iron intake     Paranoid delusion (HCC)     Essential hypertension     Chest pain     STEMI involving left anterior descending coronary artery (HCC)     Coronary

## 2025-07-17 RX ORDER — METOPROLOL TARTRATE 25 MG/1
25 TABLET, FILM COATED ORAL 2 TIMES DAILY
Qty: 60 TABLET | Refills: 3 | Status: SHIPPED | OUTPATIENT
Start: 2025-07-17

## 2025-07-17 RX ORDER — AMLODIPINE BESYLATE 5 MG/1
5 TABLET ORAL DAILY
Qty: 30 TABLET | Refills: 3 | Status: SHIPPED | OUTPATIENT
Start: 2025-07-17

## 2025-07-18 ENCOUNTER — APPOINTMENT (OUTPATIENT)
Dept: GENERAL RADIOLOGY | Age: 45
End: 2025-07-18
Payer: COMMERCIAL

## 2025-07-18 ENCOUNTER — HOSPITAL ENCOUNTER (EMERGENCY)
Age: 45
Discharge: HOME | End: 2025-07-18
Attending: EMERGENCY MEDICINE
Payer: COMMERCIAL

## 2025-07-18 VITALS
HEART RATE: 86 BPM | TEMPERATURE: 98.1 F | DIASTOLIC BLOOD PRESSURE: 87 MMHG | RESPIRATION RATE: 18 BRPM | SYSTOLIC BLOOD PRESSURE: 188 MMHG | OXYGEN SATURATION: 100 %

## 2025-07-18 DIAGNOSIS — K52.9 GASTROENTERITIS: Primary | ICD-10-CM

## 2025-07-18 LAB
ALBUMIN SERPL-MCNC: 4.4 G/DL (ref 3.5–5.2)
ALBUMIN/GLOB SERPL: 1.1 {RATIO} (ref 1–2.5)
ALP SERPL-CCNC: 92 U/L (ref 35–104)
ALT SERPL-CCNC: 23 U/L (ref 10–35)
ANION GAP SERPL CALCULATED.3IONS-SCNC: 14 MMOL/L (ref 9–16)
AST SERPL-CCNC: 27 U/L (ref 10–35)
BACTERIA URNS QL MICRO: NORMAL
BASOPHILS # BLD: <0.03 K/UL (ref 0–0.2)
BASOPHILS NFR BLD: 0 % (ref 0–2)
BILIRUB SERPL-MCNC: 0.8 MG/DL (ref 0–1.2)
BILIRUB UR QL STRIP: NEGATIVE
BUN SERPL-MCNC: 9 MG/DL (ref 6–20)
CALCIUM SERPL-MCNC: 9.7 MG/DL (ref 8.6–10.4)
CASTS #/AREA URNS LPF: NORMAL /LPF (ref 0–8)
CHLORIDE SERPL-SCNC: 100 MMOL/L (ref 98–107)
CLARITY UR: CLEAR
CO2 SERPL-SCNC: 24 MMOL/L (ref 20–31)
COLOR UR: YELLOW
CREAT SERPL-MCNC: 0.7 MG/DL (ref 0.6–0.9)
EOSINOPHIL # BLD: <0.03 K/UL (ref 0–0.44)
EOSINOPHILS RELATIVE PERCENT: 0 % (ref 1–4)
EPI CELLS #/AREA URNS HPF: NORMAL /HPF (ref 0–5)
ERYTHROCYTE [DISTWIDTH] IN BLOOD BY AUTOMATED COUNT: 14.3 % (ref 11.8–14.4)
GFR, ESTIMATED: >90 ML/MIN/1.73M2
GLUCOSE SERPL-MCNC: 87 MG/DL (ref 74–99)
GLUCOSE UR STRIP-MCNC: NEGATIVE MG/DL
HCG SERPL QL: NEGATIVE
HCT VFR BLD AUTO: 40.6 % (ref 36.3–47.1)
HGB BLD-MCNC: 13.4 G/DL (ref 11.9–15.1)
HGB UR QL STRIP.AUTO: NEGATIVE
IMM GRANULOCYTES # BLD AUTO: 0.03 K/UL (ref 0–0.3)
IMM GRANULOCYTES NFR BLD: 0 %
KETONES UR STRIP-MCNC: ABNORMAL MG/DL
LEUKOCYTE ESTERASE UR QL STRIP: NEGATIVE
LIPASE SERPL-CCNC: 13 U/L (ref 13–60)
LYMPHOCYTES NFR BLD: 0.86 K/UL (ref 1.1–3.7)
LYMPHOCYTES RELATIVE PERCENT: 10 % (ref 24–43)
MCH RBC QN AUTO: 28.3 PG (ref 25.2–33.5)
MCHC RBC AUTO-ENTMCNC: 33 G/DL (ref 28.4–34.8)
MCV RBC AUTO: 85.7 FL (ref 82.6–102.9)
MONOCYTES NFR BLD: 0.68 K/UL (ref 0.1–1.2)
MONOCYTES NFR BLD: 8 % (ref 3–12)
NEUTROPHILS NFR BLD: 82 % (ref 36–65)
NEUTS SEG NFR BLD: 7.1 K/UL (ref 1.5–8.1)
NITRITE UR QL STRIP: NEGATIVE
NRBC BLD-RTO: 0 PER 100 WBC
PH UR STRIP: 6 [PH] (ref 5–8)
PLATELET # BLD AUTO: 241 K/UL (ref 138–453)
PMV BLD AUTO: 11.5 FL (ref 8.1–13.5)
POTASSIUM SERPL-SCNC: 3.4 MMOL/L (ref 3.7–5.3)
PROT SERPL-MCNC: 8.4 G/DL (ref 6.6–8.7)
PROT UR STRIP-MCNC: ABNORMAL MG/DL
RBC # BLD AUTO: 4.74 M/UL (ref 3.95–5.11)
RBC #/AREA URNS HPF: NORMAL /HPF (ref 0–4)
SODIUM SERPL-SCNC: 138 MMOL/L (ref 136–145)
SP GR UR STRIP: 1.03 (ref 1–1.03)
TROPONIN I SERPL HS-MCNC: <6 NG/L (ref 0–14)
TROPONIN I SERPL HS-MCNC: <6 NG/L (ref 0–14)
UROBILINOGEN UR STRIP-ACNC: NORMAL EU/DL (ref 0–1)
WBC #/AREA URNS HPF: NORMAL /HPF (ref 0–5)
WBC OTHER # BLD: 8.7 K/UL (ref 3.5–11.3)

## 2025-07-18 PROCEDURE — 6370000000 HC RX 637 (ALT 250 FOR IP)

## 2025-07-18 PROCEDURE — 71046 X-RAY EXAM CHEST 2 VIEWS: CPT

## 2025-07-18 PROCEDURE — 81001 URINALYSIS AUTO W/SCOPE: CPT

## 2025-07-18 PROCEDURE — 2580000003 HC RX 258

## 2025-07-18 PROCEDURE — 96374 THER/PROPH/DIAG INJ IV PUSH: CPT

## 2025-07-18 PROCEDURE — 93005 ELECTROCARDIOGRAM TRACING: CPT

## 2025-07-18 PROCEDURE — 85025 COMPLETE CBC W/AUTO DIFF WBC: CPT

## 2025-07-18 PROCEDURE — 6360000002 HC RX W HCPCS

## 2025-07-18 PROCEDURE — 96376 TX/PRO/DX INJ SAME DRUG ADON: CPT

## 2025-07-18 PROCEDURE — 2500000003 HC RX 250 WO HCPCS

## 2025-07-18 PROCEDURE — 99285 EMERGENCY DEPT VISIT HI MDM: CPT

## 2025-07-18 PROCEDURE — 84703 CHORIONIC GONADOTROPIN ASSAY: CPT

## 2025-07-18 PROCEDURE — 80053 COMPREHEN METABOLIC PANEL: CPT

## 2025-07-18 PROCEDURE — 84484 ASSAY OF TROPONIN QUANT: CPT

## 2025-07-18 PROCEDURE — 83690 ASSAY OF LIPASE: CPT

## 2025-07-18 PROCEDURE — 96375 TX/PRO/DX INJ NEW DRUG ADDON: CPT

## 2025-07-18 RX ORDER — ONDANSETRON 4 MG/1
4 TABLET, ORALLY DISINTEGRATING ORAL 3 TIMES DAILY PRN
Qty: 21 TABLET | Refills: 0 | Status: SHIPPED | OUTPATIENT
Start: 2025-07-18

## 2025-07-18 RX ORDER — ONDANSETRON 2 MG/ML
4 INJECTION INTRAMUSCULAR; INTRAVENOUS ONCE
Status: COMPLETED | OUTPATIENT
Start: 2025-07-18 | End: 2025-07-18

## 2025-07-18 RX ORDER — FAMOTIDINE 20 MG/1
20 TABLET, FILM COATED ORAL 2 TIMES DAILY
Qty: 60 TABLET | Refills: 0 | Status: SHIPPED | OUTPATIENT
Start: 2025-07-18 | End: 2025-08-17

## 2025-07-18 RX ORDER — 0.9 % SODIUM CHLORIDE 0.9 %
1000 INTRAVENOUS SOLUTION INTRAVENOUS ONCE
Status: COMPLETED | OUTPATIENT
Start: 2025-07-18 | End: 2025-07-18

## 2025-07-18 RX ADMIN — ONDANSETRON 4 MG: 2 INJECTION INTRAMUSCULAR; INTRAVENOUS at 21:15

## 2025-07-18 RX ADMIN — ONDANSETRON 4 MG: 2 INJECTION INTRAMUSCULAR; INTRAVENOUS at 18:59

## 2025-07-18 RX ADMIN — POTASSIUM BICARBONATE 40 MEQ: 782 TABLET, EFFERVESCENT ORAL at 19:49

## 2025-07-18 RX ADMIN — FAMOTIDINE 20 MG: 10 INJECTION, SOLUTION INTRAVENOUS at 18:59

## 2025-07-18 RX ADMIN — SODIUM CHLORIDE 1000 ML: 9 INJECTION, SOLUTION INTRAVENOUS at 18:59

## 2025-07-18 ASSESSMENT — LIFESTYLE VARIABLES
HOW OFTEN DO YOU HAVE A DRINK CONTAINING ALCOHOL: PATIENT DECLINED
HOW MANY STANDARD DRINKS CONTAINING ALCOHOL DO YOU HAVE ON A TYPICAL DAY: PATIENT DECLINED

## 2025-07-18 ASSESSMENT — PAIN SCALES - GENERAL: PAINLEVEL_OUTOF10: 8

## 2025-07-18 NOTE — ED PROVIDER NOTES
[facility]     Emergency Department     Faculty Attestation    I performed a history and physical examination of the patient and discussed management with the resident. I reviewed the resident’s note and agree with the documented findings and plan of care. Any areas of disagreement are noted on the chart. I was personally present for the key portions of any procedures. I have documented in the chart those procedures where I was not present during the key portions. I have reviewed the emergency nurses triage note. I agree with the chief complaint, past medical history, past surgical history, allergies, medications, social and family history as documented unless otherwise noted below. Documentation of the HPI, Physical Exam and Medical Decision Making performed by medical students or scribes is based on my personal performance of the HPI, PE and MDM. For Physician Assistant/ Nurse Practitioner cases/documentation I have personally evaluated this patient and have completed at least one if not all key elements of the E/M (history, physical exam, and MDM). Additional findings are as noted.    Vital signs:   Vitals:    07/18/25 1818   BP: (!) 190/100   Pulse: 86   Resp: 18   Temp: 98.1 °F (36.7 °C)   SpO2: 100%      45-year-old female presents complaining of epigastric abdominal pain, nausea, vomiting, and diarrhea.  Patient states that she has had streaks of blood in her emesis as well.  No fevers or chills.  Patient is concerned because the last time she had similar symptoms she ended up needing some cardiac stents.  No chest pain or shortness of breath currently.  On exam, she is alert and afebrile.  She appears uncomfortable.  Breath sounds are clear.  Cardiac exam with a normal rate, regular rhythm.  Abdomen is soft, with tenderness over the epigastrium.  No rebound or guarding.    EKG Interpretation    Interpreted by emergency department physician    Rhythm: normal sinus   Rate:

## 2025-07-18 NOTE — ED PROVIDER NOTES
[facility] EMERGENCY DEPARTMENT  Emergency Department Encounter  Emergency Medicine Resident     Pt Name:Shelia Carver  MRN: [id number]  Birthdate 1980  Date of evaluation: 25  PCP:  Evangelina Manzo MD  Note Started: 6:26 PM EDT      CHIEF COMPLAINT       No chief complaint on file.      HISTORY OF PRESENT ILLNESS  (Location/Symptom, Timing/Onset, Context/Setting, Quality, Duration, Modifying Factors, Severity.)      Shelia Carver is a 45 y.o. female who presents with nausea, vomiting, diarrhea that began yesterday.  She also notes chills, no measured fevers.  She reports drinking a new type of smoothie prior to onset of symptoms.  No known sick contacts.  She is concerned as she had a similar presentation several years ago and was ultimately diagnosed with a heart attack, had stents placed.  She has been unable to take any of her medications today due to the vomiting including her antihypertensives.  No chest pain, shortness of breath, hematemesis, melena, hematochezia.    PAST MEDICAL / SURGICAL / SOCIAL / FAMILY HISTORY      has a past medical history of Anxiety, CAD (coronary artery disease), Claustrophobia, COVID-19, COVID-19 vaccination not done, Gestational diabetes, Hypertension, Latex allergy, Shoulder pain, left, Under care of team, and Wears glasses.     has a past surgical history that includes  section; Carpal tunnel release; Achilles tendon surgery; Cardiac surgery (2020); and mri upper extremity w or wo contrast (2023).    Social History     Socioeconomic History    Marital status: Single     Spouse name: Not on file    Number of children: Not on file    Years of education: Not on file    Highest education level: Not on file   Occupational History    Not on file   Tobacco Use    Smoking status: Former     Current packs/day: 0.00     Types: Cigarettes     Quit date: 2020     Years since quittin.0     Passive exposure: Never    Smokeless tobacco: Never

## 2025-07-18 NOTE — ED NOTES
Pt arrived via triage for c/o NVD since  3 pm yesterday  States she concerned theres a blockage in stent  Pt  states she had this problem in 2020  Sstates she's had 6 episodes of emesis  SOB  Denies cp  Call light in reach  A&O x4

## 2025-07-19 LAB
EKG ATRIAL RATE: 67 BPM
EKG P AXIS: 27 DEGREES
EKG P-R INTERVAL: 198 MS
EKG Q-T INTERVAL: 430 MS
EKG QRS DURATION: 94 MS
EKG QTC CALCULATION (BAZETT): 454 MS
EKG R AXIS: 19 DEGREES
EKG T AXIS: 9 DEGREES
EKG VENTRICULAR RATE: 67 BPM

## 2025-07-19 NOTE — DISCHARGE INSTRUCTIONS
You were seen in the ER today for nausea, vomiting, diarrhea.  Workup shows dehydration but otherwise no significant lab abnormalities.  No evidence of heart problems.   You are being sent home with nausea medications as well as something for acid reflux.  Drink plenty of fluids, start your diet back slowly and as tolerated.  Please take these medications as directed.  Please follow-up with your primary care doctor.  Return to the ER if new or worsening symptoms or any other concerns.

## 2025-08-23 ENCOUNTER — HOSPITAL ENCOUNTER (EMERGENCY)
Age: 45
Discharge: HOME | End: 2025-08-23
Attending: EMERGENCY MEDICINE
Payer: COMMERCIAL

## 2025-08-23 ENCOUNTER — APPOINTMENT (OUTPATIENT)
Dept: CT IMAGING | Age: 45
End: 2025-08-23
Payer: COMMERCIAL

## 2025-08-23 VITALS
HEART RATE: 64 BPM | OXYGEN SATURATION: 100 % | DIASTOLIC BLOOD PRESSURE: 68 MMHG | SYSTOLIC BLOOD PRESSURE: 204 MMHG | RESPIRATION RATE: 16 BRPM | TEMPERATURE: 98.4 F

## 2025-08-23 DIAGNOSIS — M54.2 NECK PAIN: ICD-10-CM
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
## 2025-08-23 DIAGNOSIS — E04.9 ENLARGED THYROID: ICD-10-CM

## 2025-08-23 PROCEDURE — 72125 CT NECK SPINE W/O DYE: CPT

## 2025-08-23 PROCEDURE — 6370000000 HC RX 637 (ALT 250 FOR IP)

## 2025-08-23 PROCEDURE — 99284 EMERGENCY DEPT VISIT MOD MDM: CPT

## 2025-08-23 PROCEDURE — 70450 CT HEAD/BRAIN W/O DYE: CPT

## 2025-08-23 RX ORDER — ACETAMINOPHEN 325 MG/1
650 TABLET ORAL ONCE
Status: COMPLETED | OUTPATIENT
Start: 2025-08-23 | End: 2025-08-23

## 2025-08-23 RX ORDER — CYCLOBENZAPRINE HCL 5 MG
5 TABLET ORAL 2 TIMES DAILY PRN
Qty: 12 TABLET | Refills: 0 | Status: SHIPPED | OUTPATIENT
Start: 2025-08-23 | End: 2025-09-02

## 2025-08-23 RX ORDER — IBUPROFEN 400 MG/1
400 TABLET, FILM COATED ORAL ONCE
Status: COMPLETED | OUTPATIENT
Start: 2025-08-23 | End: 2025-08-23

## 2025-08-23 RX ORDER — LIDOCAINE 50 MG/G
1 PATCH TOPICAL DAILY
Qty: 10 PATCH | Refills: 0 | Status: SHIPPED | OUTPATIENT
Start: 2025-08-23 | End: 2025-09-02

## 2025-08-23 RX ADMIN — ACETAMINOPHEN 650 MG: 325 TABLET ORAL at 07:59

## 2025-08-23 RX ADMIN — IBUPROFEN 400 MG: 400 TABLET ORAL at 08:00

## 2025-08-23 ASSESSMENT — PAIN SCALES - GENERAL: PAINLEVEL_OUTOF10: 5

## 2025-08-23 ASSESSMENT — PAIN - FUNCTIONAL ASSESSMENT
PAIN_FUNCTIONAL_ASSESSMENT: 0-10
PAIN_FUNCTIONAL_ASSESSMENT: 0-10

## 2025-08-26 ENCOUNTER — TELEPHONE (OUTPATIENT)
Age: 45
End: 2025-08-26